# Patient Record
Sex: FEMALE | Race: WHITE | Employment: OTHER | URBAN - METROPOLITAN AREA
[De-identification: names, ages, dates, MRNs, and addresses within clinical notes are randomized per-mention and may not be internally consistent; named-entity substitution may affect disease eponyms.]

---

## 2017-01-23 RX ORDER — TIZANIDINE 2 MG/1
TABLET ORAL
Qty: 60 TABLET | Refills: 2 | Status: SHIPPED | OUTPATIENT
Start: 2017-01-23 | End: 2017-09-17 | Stop reason: SDUPTHER

## 2017-01-26 DIAGNOSIS — F32.0 MAJOR DEPRESSIVE DISORDER, SINGLE EPISODE, MILD (HCC): ICD-10-CM

## 2017-01-26 RX ORDER — FLUOXETINE HYDROCHLORIDE 20 MG/1
CAPSULE ORAL
Qty: 270 CAPSULE | Refills: 1 | Status: SHIPPED | OUTPATIENT
Start: 2017-01-26 | End: 2017-07-31 | Stop reason: SDUPTHER

## 2017-02-07 ENCOUNTER — TELEPHONE (OUTPATIENT)
Dept: INTERNAL MEDICINE CLINIC | Age: 62
End: 2017-02-07

## 2017-02-08 RX ORDER — ALPRAZOLAM 0.25 MG/1
0.25 TABLET ORAL EVERY 4 HOURS PRN
Qty: 100 TABLET | Refills: 0 | Status: SHIPPED | OUTPATIENT
Start: 2017-02-08 | End: 2017-04-18 | Stop reason: SDUPTHER

## 2017-02-15 ENCOUNTER — OFFICE VISIT (OUTPATIENT)
Dept: INTERNAL MEDICINE CLINIC | Age: 62
End: 2017-02-15

## 2017-02-15 VITALS
HEART RATE: 80 BPM | SYSTOLIC BLOOD PRESSURE: 128 MMHG | WEIGHT: 293 LBS | DIASTOLIC BLOOD PRESSURE: 76 MMHG | BODY MASS INDEX: 44.41 KG/M2 | HEIGHT: 68 IN

## 2017-02-15 DIAGNOSIS — I10 ESSENTIAL HYPERTENSION: ICD-10-CM

## 2017-02-15 DIAGNOSIS — F33.42 RECURRENT MAJOR DEPRESSIVE DISORDER, IN FULL REMISSION (HCC): ICD-10-CM

## 2017-02-15 DIAGNOSIS — E78.2 MIXED HYPERLIPIDEMIA: Primary | ICD-10-CM

## 2017-02-15 DIAGNOSIS — E03.9 ACQUIRED HYPOTHYROIDISM: ICD-10-CM

## 2017-02-15 DIAGNOSIS — Z12.31 ENCOUNTER FOR SCREENING MAMMOGRAM FOR BREAST CANCER: ICD-10-CM

## 2017-02-15 DIAGNOSIS — E66.01 MORBID OBESITY WITH BMI OF 45.0-49.9, ADULT (HCC): ICD-10-CM

## 2017-02-15 DIAGNOSIS — E78.2 MIXED HYPERLIPIDEMIA: ICD-10-CM

## 2017-02-15 LAB
ANION GAP SERPL CALCULATED.3IONS-SCNC: 22 MMOL/L (ref 3–16)
BUN BLDV-MCNC: 15 MG/DL (ref 7–20)
CALCIUM SERPL-MCNC: 9.4 MG/DL (ref 8.3–10.6)
CHLORIDE BLD-SCNC: 98 MMOL/L (ref 99–110)
CHOLESTEROL, TOTAL: 202 MG/DL (ref 0–199)
CO2: 23 MMOL/L (ref 21–32)
CREAT SERPL-MCNC: 0.8 MG/DL (ref 0.6–1.2)
GFR AFRICAN AMERICAN: >60
GFR NON-AFRICAN AMERICAN: >60
GLUCOSE BLD-MCNC: 83 MG/DL (ref 70–99)
HDLC SERPL-MCNC: 74 MG/DL (ref 40–60)
LDL CHOLESTEROL CALCULATED: 96 MG/DL
POTASSIUM SERPL-SCNC: 4.8 MMOL/L (ref 3.5–5.1)
SODIUM BLD-SCNC: 143 MMOL/L (ref 136–145)
TRIGL SERPL-MCNC: 162 MG/DL (ref 0–150)
TSH SERPL DL<=0.05 MIU/L-ACNC: 3.6 UIU/ML (ref 0.27–4.2)
VLDLC SERPL CALC-MCNC: 32 MG/DL

## 2017-02-15 PROCEDURE — 99214 OFFICE O/P EST MOD 30 MIN: CPT | Performed by: INTERNAL MEDICINE

## 2017-02-15 ASSESSMENT — ENCOUNTER SYMPTOMS
EYE REDNESS: 0
EYE PAIN: 1
SHORTNESS OF BREATH: 0

## 2017-03-06 RX ORDER — LEVOTHYROXINE SODIUM 0.2 MG/1
TABLET ORAL
Qty: 90 TABLET | Refills: 0 | Status: SHIPPED | OUTPATIENT
Start: 2017-03-06 | End: 2017-06-06 | Stop reason: SDUPTHER

## 2017-03-22 RX ORDER — TRAMADOL HYDROCHLORIDE 50 MG/1
TABLET ORAL
Qty: 60 TABLET | Refills: 0 | Status: SHIPPED | OUTPATIENT
Start: 2017-03-22 | End: 2017-05-01 | Stop reason: SDUPTHER

## 2017-04-05 RX ORDER — AMLODIPINE BESYLATE 10 MG/1
TABLET ORAL
Qty: 90 TABLET | Refills: 1 | Status: SHIPPED | OUTPATIENT
Start: 2017-04-05 | End: 2017-10-11 | Stop reason: SDUPTHER

## 2017-04-05 RX ORDER — OMEPRAZOLE 40 MG/1
CAPSULE, DELAYED RELEASE ORAL
Qty: 90 CAPSULE | Refills: 1 | Status: SHIPPED | OUTPATIENT
Start: 2017-04-05 | End: 2017-10-11 | Stop reason: SDUPTHER

## 2017-04-05 RX ORDER — HYDROCODONE BITARTRATE AND ACETAMINOPHEN 5; 325 MG/1; MG/1
1 TABLET ORAL EVERY 6 HOURS PRN
Qty: 30 TABLET | Refills: 0 | Status: SHIPPED | OUTPATIENT
Start: 2017-04-05 | End: 2017-10-23 | Stop reason: SDUPTHER

## 2017-04-16 RX ORDER — PRAVASTATIN SODIUM 80 MG/1
TABLET ORAL
Qty: 90 TABLET | Refills: 1 | Status: SHIPPED | OUTPATIENT
Start: 2017-04-16 | End: 2017-09-26 | Stop reason: ALTCHOICE

## 2017-04-19 RX ORDER — ALPRAZOLAM 0.25 MG/1
TABLET ORAL
Qty: 100 TABLET | Refills: 0 | Status: SHIPPED | OUTPATIENT
Start: 2017-04-19 | End: 2017-08-29 | Stop reason: SDUPTHER

## 2017-05-02 RX ORDER — TRAMADOL HYDROCHLORIDE 50 MG/1
TABLET ORAL
Qty: 60 TABLET | Refills: 0 | Status: SHIPPED | OUTPATIENT
Start: 2017-05-02 | End: 2017-09-26 | Stop reason: ALTCHOICE

## 2017-05-16 ENCOUNTER — OFFICE VISIT (OUTPATIENT)
Dept: INTERNAL MEDICINE CLINIC | Age: 62
End: 2017-05-16

## 2017-05-16 VITALS
HEIGHT: 68 IN | SYSTOLIC BLOOD PRESSURE: 122 MMHG | RESPIRATION RATE: 12 BRPM | DIASTOLIC BLOOD PRESSURE: 76 MMHG | BODY MASS INDEX: 44.41 KG/M2 | HEART RATE: 83 BPM | WEIGHT: 293 LBS | OXYGEN SATURATION: 96 % | TEMPERATURE: 98.4 F

## 2017-05-16 DIAGNOSIS — I10 ESSENTIAL HYPERTENSION: Primary | ICD-10-CM

## 2017-05-16 DIAGNOSIS — E03.9 ACQUIRED HYPOTHYROIDISM: ICD-10-CM

## 2017-05-16 DIAGNOSIS — F41.9 ANXIETY: ICD-10-CM

## 2017-05-16 DIAGNOSIS — F32.A DEPRESSION, UNSPECIFIED DEPRESSION TYPE: ICD-10-CM

## 2017-05-16 DIAGNOSIS — E78.2 MIXED HYPERLIPIDEMIA: ICD-10-CM

## 2017-05-16 DIAGNOSIS — E55.9 VITAMIN D DEFICIENCY: ICD-10-CM

## 2017-05-16 DIAGNOSIS — Z12.31 ENCOUNTER FOR SCREENING MAMMOGRAM FOR BREAST CANCER: ICD-10-CM

## 2017-05-16 DIAGNOSIS — M79.7 FIBROMYALGIA: ICD-10-CM

## 2017-05-16 PROCEDURE — 99214 OFFICE O/P EST MOD 30 MIN: CPT | Performed by: INTERNAL MEDICINE

## 2017-05-16 ASSESSMENT — PATIENT HEALTH QUESTIONNAIRE - PHQ9
2. FEELING DOWN, DEPRESSED OR HOPELESS: 1
1. LITTLE INTEREST OR PLEASURE IN DOING THINGS: 1
SUM OF ALL RESPONSES TO PHQ9 QUESTIONS 1 & 2: 2
SUM OF ALL RESPONSES TO PHQ QUESTIONS 1-9: 2

## 2017-05-22 ASSESSMENT — ENCOUNTER SYMPTOMS
SINUS PRESSURE: 0
SORE THROAT: 0
CHEST TIGHTNESS: 0
VOMITING: 0
ABDOMINAL PAIN: 0
NAUSEA: 0
BLOOD IN STOOL: 0
DIARRHEA: 0
TROUBLE SWALLOWING: 0
COUGH: 0
RHINORRHEA: 0
WHEEZING: 0
CONSTIPATION: 0
SHORTNESS OF BREATH: 0

## 2017-06-06 RX ORDER — LEVOTHYROXINE SODIUM 0.2 MG/1
TABLET ORAL
Qty: 90 TABLET | Refills: 0 | Status: SHIPPED | OUTPATIENT
Start: 2017-06-06 | End: 2017-09-07 | Stop reason: SDUPTHER

## 2017-06-22 RX ORDER — METHOCARBAMOL 500 MG/1
500 TABLET, FILM COATED ORAL 4 TIMES DAILY PRN
Qty: 60 TABLET | Refills: 3 | Status: SHIPPED | OUTPATIENT
Start: 2017-06-22 | End: 2018-04-25 | Stop reason: SDUPTHER

## 2017-07-31 DIAGNOSIS — F32.0 MAJOR DEPRESSIVE DISORDER, SINGLE EPISODE, MILD (HCC): ICD-10-CM

## 2017-07-31 RX ORDER — FLUOXETINE HYDROCHLORIDE 20 MG/1
CAPSULE ORAL
Qty: 270 CAPSULE | Refills: 0 | Status: SHIPPED | OUTPATIENT
Start: 2017-07-31 | End: 2017-10-30 | Stop reason: SDUPTHER

## 2017-08-21 RX ORDER — ALPRAZOLAM 0.25 MG/1
TABLET ORAL
Qty: 100 TABLET | Refills: 0 | OUTPATIENT
Start: 2017-08-21

## 2017-08-29 ENCOUNTER — OFFICE VISIT (OUTPATIENT)
Dept: INTERNAL MEDICINE CLINIC | Age: 62
End: 2017-08-29

## 2017-08-29 VITALS
DIASTOLIC BLOOD PRESSURE: 78 MMHG | WEIGHT: 293 LBS | HEART RATE: 65 BPM | HEIGHT: 68 IN | OXYGEN SATURATION: 98 % | BODY MASS INDEX: 44.41 KG/M2 | SYSTOLIC BLOOD PRESSURE: 130 MMHG

## 2017-08-29 DIAGNOSIS — R20.2 PARESTHESIAS: ICD-10-CM

## 2017-08-29 DIAGNOSIS — E78.2 MIXED HYPERLIPIDEMIA: ICD-10-CM

## 2017-08-29 DIAGNOSIS — M79.7 FIBROMYALGIA: ICD-10-CM

## 2017-08-29 DIAGNOSIS — I10 ESSENTIAL HYPERTENSION: ICD-10-CM

## 2017-08-29 DIAGNOSIS — F32.A DEPRESSION, UNSPECIFIED DEPRESSION TYPE: ICD-10-CM

## 2017-08-29 DIAGNOSIS — M79.605 PAIN IN BOTH LOWER EXTREMITIES: ICD-10-CM

## 2017-08-29 DIAGNOSIS — M79.604 PAIN IN BOTH LOWER EXTREMITIES: ICD-10-CM

## 2017-08-29 DIAGNOSIS — E03.9 ACQUIRED HYPOTHYROIDISM: Primary | ICD-10-CM

## 2017-08-29 DIAGNOSIS — E03.9 ACQUIRED HYPOTHYROIDISM: ICD-10-CM

## 2017-08-29 DIAGNOSIS — F41.9 ANXIETY: ICD-10-CM

## 2017-08-29 LAB
A/G RATIO: 1.4 (ref 1.1–2.2)
ALBUMIN SERPL-MCNC: 3.9 G/DL (ref 3.4–5)
ALP BLD-CCNC: 61 U/L (ref 40–129)
ALT SERPL-CCNC: 13 U/L (ref 10–40)
ANION GAP SERPL CALCULATED.3IONS-SCNC: 12 MMOL/L (ref 3–16)
AST SERPL-CCNC: 15 U/L (ref 15–37)
BASOPHILS ABSOLUTE: 0.1 K/UL (ref 0–0.2)
BASOPHILS RELATIVE PERCENT: 0.9 %
BILIRUB SERPL-MCNC: 0.3 MG/DL (ref 0–1)
BUN BLDV-MCNC: 14 MG/DL (ref 7–20)
CALCIUM SERPL-MCNC: 9 MG/DL (ref 8.3–10.6)
CHLORIDE BLD-SCNC: 103 MMOL/L (ref 99–110)
CHOLESTEROL, TOTAL: 182 MG/DL (ref 0–199)
CO2: 26 MMOL/L (ref 21–32)
CREAT SERPL-MCNC: 0.8 MG/DL (ref 0.6–1.2)
EOSINOPHILS ABSOLUTE: 0.4 K/UL (ref 0–0.6)
EOSINOPHILS RELATIVE PERCENT: 5.2 %
GFR AFRICAN AMERICAN: >60
GFR NON-AFRICAN AMERICAN: >60
GLOBULIN: 2.8 G/DL
GLUCOSE BLD-MCNC: 115 MG/DL (ref 70–99)
HCT VFR BLD CALC: 41 % (ref 36–48)
HDLC SERPL-MCNC: 67 MG/DL (ref 40–60)
HEMOGLOBIN: 12.8 G/DL (ref 12–16)
LDL CHOLESTEROL CALCULATED: 85 MG/DL
LYMPHOCYTES ABSOLUTE: 2.6 K/UL (ref 1–5.1)
LYMPHOCYTES RELATIVE PERCENT: 35.5 %
MAGNESIUM: 1.8 MG/DL (ref 1.8–2.4)
MCH RBC QN AUTO: 24.8 PG (ref 26–34)
MCHC RBC AUTO-ENTMCNC: 31.2 G/DL (ref 31–36)
MCV RBC AUTO: 79.7 FL (ref 80–100)
MONOCYTES ABSOLUTE: 0.4 K/UL (ref 0–1.3)
MONOCYTES RELATIVE PERCENT: 5.3 %
NEUTROPHILS ABSOLUTE: 3.9 K/UL (ref 1.7–7.7)
NEUTROPHILS RELATIVE PERCENT: 53.1 %
PDW BLD-RTO: 17 % (ref 12.4–15.4)
PLATELET # BLD: 358 K/UL (ref 135–450)
PMV BLD AUTO: 9.2 FL (ref 5–10.5)
POTASSIUM SERPL-SCNC: 4.3 MMOL/L (ref 3.5–5.1)
RBC # BLD: 5.15 M/UL (ref 4–5.2)
SODIUM BLD-SCNC: 141 MMOL/L (ref 136–145)
TOTAL CK: 139 U/L (ref 26–192)
TOTAL PROTEIN: 6.7 G/DL (ref 6.4–8.2)
TRIGL SERPL-MCNC: 150 MG/DL (ref 0–150)
TSH SERPL DL<=0.05 MIU/L-ACNC: 4.89 UIU/ML (ref 0.27–4.2)
VITAMIN B-12: 877 PG/ML (ref 211–911)
VLDLC SERPL CALC-MCNC: 30 MG/DL
WBC # BLD: 7.3 K/UL (ref 4–11)

## 2017-08-29 PROCEDURE — 99214 OFFICE O/P EST MOD 30 MIN: CPT | Performed by: INTERNAL MEDICINE

## 2017-08-29 RX ORDER — ALPRAZOLAM 0.25 MG/1
0.25 TABLET ORAL 2 TIMES DAILY PRN
Qty: 60 TABLET | Refills: 1 | Status: SHIPPED | OUTPATIENT
Start: 2017-08-29 | End: 2018-01-24 | Stop reason: SDUPTHER

## 2017-08-29 RX ORDER — GABAPENTIN 100 MG/1
100 CAPSULE ORAL NIGHTLY
Qty: 30 CAPSULE | Refills: 0 | Status: SHIPPED | OUTPATIENT
Start: 2017-08-29 | End: 2017-10-23 | Stop reason: SDUPTHER

## 2017-08-30 ASSESSMENT — ENCOUNTER SYMPTOMS
NAUSEA: 0
DIARRHEA: 0
TROUBLE SWALLOWING: 0
RHINORRHEA: 0
ABDOMINAL PAIN: 0
CONSTIPATION: 0
VOMITING: 0
COUGH: 0
WHEEZING: 0
BLOOD IN STOOL: 0
SHORTNESS OF BREATH: 0
SORE THROAT: 0
SINUS PRESSURE: 0
CHEST TIGHTNESS: 0

## 2017-09-07 DIAGNOSIS — E03.9 ACQUIRED HYPOTHYROIDISM: Primary | ICD-10-CM

## 2017-09-08 RX ORDER — LEVOTHYROXINE SODIUM 0.2 MG/1
TABLET ORAL
Qty: 90 TABLET | Refills: 0 | Status: SHIPPED | OUTPATIENT
Start: 2017-09-08 | End: 2017-12-21 | Stop reason: SDUPTHER

## 2017-09-18 RX ORDER — TIZANIDINE 2 MG/1
2 TABLET ORAL NIGHTLY
Qty: 30 TABLET | Refills: 3 | Status: SHIPPED | OUTPATIENT
Start: 2017-09-18 | End: 2018-03-16 | Stop reason: SDUPTHER

## 2017-09-26 ENCOUNTER — OFFICE VISIT (OUTPATIENT)
Dept: INTERNAL MEDICINE CLINIC | Age: 62
End: 2017-09-26

## 2017-09-26 VITALS
DIASTOLIC BLOOD PRESSURE: 76 MMHG | TEMPERATURE: 97.9 F | OXYGEN SATURATION: 96 % | SYSTOLIC BLOOD PRESSURE: 132 MMHG | BODY MASS INDEX: 44.41 KG/M2 | WEIGHT: 293 LBS | HEIGHT: 68 IN | RESPIRATION RATE: 14 BRPM | HEART RATE: 77 BPM

## 2017-09-26 DIAGNOSIS — M54.6 LEFT-SIDED THORACIC BACK PAIN, UNSPECIFIED CHRONICITY: ICD-10-CM

## 2017-09-26 DIAGNOSIS — R10.13 EPIGASTRIC ABDOMINAL PAIN: ICD-10-CM

## 2017-09-26 DIAGNOSIS — M79.605 PAIN IN BOTH LOWER EXTREMITIES: Primary | ICD-10-CM

## 2017-09-26 DIAGNOSIS — R19.7 DIARRHEA, UNSPECIFIED TYPE: ICD-10-CM

## 2017-09-26 DIAGNOSIS — R11.0 NAUSEA: ICD-10-CM

## 2017-09-26 DIAGNOSIS — R73.9 HYPERGLYCEMIA: ICD-10-CM

## 2017-09-26 DIAGNOSIS — R10.12 ABDOMINAL PAIN, LEFT UPPER QUADRANT: ICD-10-CM

## 2017-09-26 DIAGNOSIS — E03.9 ACQUIRED HYPOTHYROIDISM: ICD-10-CM

## 2017-09-26 DIAGNOSIS — M79.604 PAIN IN BOTH LOWER EXTREMITIES: Primary | ICD-10-CM

## 2017-09-26 LAB
BILIRUBIN, POC: NORMAL
BLOOD URINE, POC: NORMAL
CLARITY, POC: NORMAL
COLOR, POC: NORMAL
GLUCOSE URINE, POC: NORMAL
KETONES, POC: NORMAL
LEUKOCYTE EST, POC: NORMAL
NITRITE, POC: NORMAL
PH, POC: 6
PROTEIN, POC: NORMAL
SPECIFIC GRAVITY, POC: >=1.03
UROBILINOGEN, POC: 0.2

## 2017-09-26 PROCEDURE — 99214 OFFICE O/P EST MOD 30 MIN: CPT | Performed by: INTERNAL MEDICINE

## 2017-09-26 PROCEDURE — 81002 URINALYSIS NONAUTO W/O SCOPE: CPT | Performed by: INTERNAL MEDICINE

## 2017-09-26 RX ORDER — PROMETHAZINE HYDROCHLORIDE 25 MG/1
25 TABLET ORAL EVERY 6 HOURS PRN
Qty: 15 TABLET | Refills: 0 | Status: ON HOLD | OUTPATIENT
Start: 2017-09-26 | End: 2022-06-06 | Stop reason: HOSPADM

## 2017-09-26 RX ORDER — LEVOTHYROXINE SODIUM 0.03 MG/1
25 TABLET ORAL DAILY
Qty: 30 TABLET | Refills: 1 | Status: SHIPPED | OUTPATIENT
Start: 2017-09-26 | End: 2017-10-19 | Stop reason: SDUPTHER

## 2017-09-26 RX ORDER — FAMOTIDINE 20 MG/1
20 TABLET, FILM COATED ORAL 2 TIMES DAILY
Qty: 30 TABLET | Refills: 0 | Status: SHIPPED | OUTPATIENT
Start: 2017-09-26 | End: 2017-12-21 | Stop reason: SDUPTHER

## 2017-09-27 ENCOUNTER — CARE COORDINATION (OUTPATIENT)
Dept: CARE COORDINATION | Age: 62
End: 2017-09-27

## 2017-09-27 ASSESSMENT — ENCOUNTER SYMPTOMS
CHEST TIGHTNESS: 0
BACK PAIN: 1
COUGH: 0
CONSTIPATION: 0
ABDOMINAL PAIN: 1
SORE THROAT: 0
SINUS PRESSURE: 0
BLOOD IN STOOL: 0
SHORTNESS OF BREATH: 0
DIARRHEA: 1
NAUSEA: 1
RHINORRHEA: 0
WHEEZING: 0
VOMITING: 0

## 2017-09-27 NOTE — CARE COORDINATION
Ambulatory Care Coordination ED Follow up Call       Reason for ED Visit:  Abdominal Pain   Care Management Risk Score: CMRS 4    Left message for a return call regarding ED visit 9/26 . Will attempt to reach patient again.      [facility] Coordination   [phone]

## 2017-10-02 ENCOUNTER — TELEPHONE (OUTPATIENT)
Dept: INTERNAL MEDICINE CLINIC | Age: 62
End: 2017-10-02

## 2017-10-02 NOTE — CARE COORDINATION
Ambulatory Care Coordination ED Follow up Call       Reason for ED Visit:  Abdominal Pain   Care Management Risk Score: CMRS 4    Left another message regarding ED visit 9/26. Will wait for a return call.      135 [facility] Coordination   [phone]

## 2017-10-11 RX ORDER — AMLODIPINE BESYLATE 10 MG/1
TABLET ORAL
Qty: 90 TABLET | Refills: 1 | Status: SHIPPED | OUTPATIENT
Start: 2017-10-11 | End: 2018-01-03 | Stop reason: SDUPTHER

## 2017-10-11 RX ORDER — OMEPRAZOLE 40 MG/1
CAPSULE, DELAYED RELEASE ORAL
Qty: 90 CAPSULE | Refills: 1 | Status: SHIPPED | OUTPATIENT
Start: 2017-10-11 | End: 2018-04-16 | Stop reason: SDUPTHER

## 2017-10-19 DIAGNOSIS — E03.9 ACQUIRED HYPOTHYROIDISM: ICD-10-CM

## 2017-10-19 RX ORDER — LEVOTHYROXINE SODIUM 0.03 MG/1
25 TABLET ORAL DAILY
Qty: 90 TABLET | Refills: 0 | Status: SHIPPED | OUTPATIENT
Start: 2017-10-19 | End: 2018-02-02 | Stop reason: SDUPTHER

## 2017-10-23 DIAGNOSIS — R20.2 PARESTHESIAS: Primary | ICD-10-CM

## 2017-10-23 DIAGNOSIS — M79.7 FIBROMYALGIA: Primary | ICD-10-CM

## 2017-10-23 RX ORDER — HYDROCODONE BITARTRATE AND ACETAMINOPHEN 5; 325 MG/1; MG/1
1 TABLET ORAL EVERY 6 HOURS PRN
Qty: 30 TABLET | Refills: 0 | Status: SHIPPED | OUTPATIENT
Start: 2017-10-23 | End: 2018-01-11 | Stop reason: SDUPTHER

## 2017-10-23 RX ORDER — GABAPENTIN 100 MG/1
100 CAPSULE ORAL NIGHTLY
Qty: 90 CAPSULE | Refills: 0 | Status: SHIPPED | OUTPATIENT
Start: 2017-10-23 | End: 2020-12-31 | Stop reason: ALTCHOICE

## 2017-10-23 NOTE — TELEPHONE ENCOUNTER
From: Rosie Duffy  Sent: 10/22/2017 1:27 AM EDT  Subject: Medication Renewal Request    Rosie Tati would like a refill of the following medications:  HYDROcodone-acetaminophen ([facility]) 5-325 MG per tablet Anca Rueda MD]    Preferred pharmacy: [address] [place], [address] [place] [address] [phone] - F [phone]    Comment:  I am having a really bad flare up of fibro pain.  Thank you

## 2017-10-24 NOTE — TELEPHONE ENCOUNTER
Rx for [facility] refilled. Call pt to schedule follow up to be seen no later than 11/29/17 for fibromyalgia, anxiety, and chronic medical conditions. Pt takes controlled substance medications.

## 2017-10-30 DIAGNOSIS — F32.0 MAJOR DEPRESSIVE DISORDER, SINGLE EPISODE, MILD (HCC): ICD-10-CM

## 2017-10-30 RX ORDER — FLUOXETINE HYDROCHLORIDE 20 MG/1
CAPSULE ORAL
Qty: 270 CAPSULE | Refills: 0 | Status: SHIPPED | OUTPATIENT
Start: 2017-10-30 | End: 2018-01-29 | Stop reason: SDUPTHER

## 2017-11-02 RX ORDER — PRAVASTATIN SODIUM 80 MG/1
TABLET ORAL
Qty: 90 TABLET | Refills: 1 | Status: SHIPPED | OUTPATIENT
Start: 2017-11-02 | End: 2018-02-22 | Stop reason: SINTOL

## 2017-11-10 ENCOUNTER — TELEPHONE (OUTPATIENT)
Dept: OTHER | Facility: CLINIC | Age: 62
End: 2017-11-10

## 2017-11-24 ENCOUNTER — OFFICE VISIT (OUTPATIENT)
Dept: INTERNAL MEDICINE CLINIC | Age: 62
End: 2017-11-24

## 2017-11-24 VITALS
SYSTOLIC BLOOD PRESSURE: 130 MMHG | RESPIRATION RATE: 12 BRPM | OXYGEN SATURATION: 96 % | TEMPERATURE: 98.3 F | WEIGHT: 293 LBS | HEIGHT: 68 IN | DIASTOLIC BLOOD PRESSURE: 78 MMHG | BODY MASS INDEX: 44.41 KG/M2 | HEART RATE: 77 BPM

## 2017-11-24 DIAGNOSIS — I10 ESSENTIAL HYPERTENSION: ICD-10-CM

## 2017-11-24 DIAGNOSIS — E78.2 MIXED HYPERLIPIDEMIA: ICD-10-CM

## 2017-11-24 DIAGNOSIS — R73.9 HYPERGLYCEMIA: ICD-10-CM

## 2017-11-24 DIAGNOSIS — Z12.31 ENCOUNTER FOR SCREENING MAMMOGRAM FOR BREAST CANCER: ICD-10-CM

## 2017-11-24 DIAGNOSIS — E66.01 MORBID OBESITY WITH BMI OF 45.0-49.9, ADULT (HCC): ICD-10-CM

## 2017-11-24 DIAGNOSIS — E55.9 VITAMIN D DEFICIENCY: ICD-10-CM

## 2017-11-24 DIAGNOSIS — E03.9 ACQUIRED HYPOTHYROIDISM: ICD-10-CM

## 2017-11-24 DIAGNOSIS — I10 ESSENTIAL HYPERTENSION: Primary | ICD-10-CM

## 2017-11-24 DIAGNOSIS — F33.41 RECURRENT MAJOR DEPRESSIVE DISORDER, IN PARTIAL REMISSION (HCC): ICD-10-CM

## 2017-11-24 DIAGNOSIS — Z23 NEED FOR INFLUENZA VACCINATION: ICD-10-CM

## 2017-11-24 DIAGNOSIS — R26.89 BALANCE PROBLEMS: ICD-10-CM

## 2017-11-24 LAB
A/G RATIO: 1.4 (ref 1.1–2.2)
ALBUMIN SERPL-MCNC: 4.2 G/DL (ref 3.4–5)
ALP BLD-CCNC: 68 U/L (ref 40–129)
ALT SERPL-CCNC: 16 U/L (ref 10–40)
ANION GAP SERPL CALCULATED.3IONS-SCNC: 16 MMOL/L (ref 3–16)
AST SERPL-CCNC: 17 U/L (ref 15–37)
BILIRUB SERPL-MCNC: 0.3 MG/DL (ref 0–1)
BUN BLDV-MCNC: 18 MG/DL (ref 7–20)
CALCIUM SERPL-MCNC: 9.1 MG/DL (ref 8.3–10.6)
CHLORIDE BLD-SCNC: 100 MMOL/L (ref 99–110)
CHOLESTEROL, TOTAL: 225 MG/DL (ref 0–199)
CO2: 27 MMOL/L (ref 21–32)
CREAT SERPL-MCNC: 0.9 MG/DL (ref 0.6–1.2)
GFR AFRICAN AMERICAN: >60
GFR NON-AFRICAN AMERICAN: >60
GLOBULIN: 2.9 G/DL
GLUCOSE BLD-MCNC: 115 MG/DL (ref 70–99)
HDLC SERPL-MCNC: 73 MG/DL (ref 40–60)
LDL CHOLESTEROL CALCULATED: 124 MG/DL
POTASSIUM SERPL-SCNC: 4.8 MMOL/L (ref 3.5–5.1)
SODIUM BLD-SCNC: 143 MMOL/L (ref 136–145)
TOTAL PROTEIN: 7.1 G/DL (ref 6.4–8.2)
TRIGL SERPL-MCNC: 142 MG/DL (ref 0–150)
TSH SERPL DL<=0.05 MIU/L-ACNC: 1.98 UIU/ML (ref 0.27–4.2)
VITAMIN D 25-HYDROXY: 39.6 NG/ML
VLDLC SERPL CALC-MCNC: 28 MG/DL

## 2017-11-24 PROCEDURE — G8427 DOCREV CUR MEDS BY ELIG CLIN: HCPCS | Performed by: INTERNAL MEDICINE

## 2017-11-24 PROCEDURE — 3017F COLORECTAL CA SCREEN DOC REV: CPT | Performed by: INTERNAL MEDICINE

## 2017-11-24 PROCEDURE — 3014F SCREEN MAMMO DOC REV: CPT | Performed by: INTERNAL MEDICINE

## 2017-11-24 PROCEDURE — 1036F TOBACCO NON-USER: CPT | Performed by: INTERNAL MEDICINE

## 2017-11-24 PROCEDURE — G8484 FLU IMMUNIZE NO ADMIN: HCPCS | Performed by: INTERNAL MEDICINE

## 2017-11-24 PROCEDURE — 90630 INFLUENZA, QUADV, 18-64 YRS, ID, PF, MICRO INJ, 0.1ML (FLUZONE QUADV, PF): CPT | Performed by: INTERNAL MEDICINE

## 2017-11-24 PROCEDURE — 99214 OFFICE O/P EST MOD 30 MIN: CPT | Performed by: INTERNAL MEDICINE

## 2017-11-24 PROCEDURE — G8417 CALC BMI ABV UP PARAM F/U: HCPCS | Performed by: INTERNAL MEDICINE

## 2017-11-24 PROCEDURE — G0008 ADMIN INFLUENZA VIRUS VAC: HCPCS | Performed by: INTERNAL MEDICINE

## 2017-11-24 RX ORDER — BENZONATATE 100 MG/1
100 CAPSULE ORAL 3 TIMES DAILY PRN
Qty: 30 CAPSULE | Refills: 0 | Status: SHIPPED | OUTPATIENT
Start: 2017-11-24 | End: 2017-12-01

## 2017-11-24 RX ORDER — FLUTICASONE PROPIONATE 50 MCG
2 SPRAY, SUSPENSION (ML) NASAL DAILY
Qty: 1 BOTTLE | Refills: 0 | Status: SHIPPED | OUTPATIENT
Start: 2017-11-24

## 2017-11-24 RX ORDER — BUPROPION HYDROCHLORIDE 150 MG/1
150 TABLET ORAL EVERY MORNING
Qty: 30 TABLET | Refills: 1 | Status: SHIPPED | OUTPATIENT
Start: 2017-11-24 | End: 2018-01-19 | Stop reason: SDUPTHER

## 2017-11-24 ASSESSMENT — ENCOUNTER SYMPTOMS
RHINORRHEA: 0
NAUSEA: 0
VOMITING: 0
WHEEZING: 0
SHORTNESS OF BREATH: 0
SORE THROAT: 0
DIARRHEA: 0
CONSTIPATION: 0
COUGH: 0
CHEST TIGHTNESS: 0
BLOOD IN STOOL: 0
ABDOMINAL PAIN: 0
SINUS PRESSURE: 0

## 2017-11-24 NOTE — PATIENT INSTRUCTIONS
-Continue same medications  -start Wellbutrin XL 150mg once daily for depression  -Flonase nasal spray 2 sprays each nostril once daily   -Tessalon Perles 100mg 3 times daily as needed for cough  -discontinue Gabapentin  -Referral to Bariatrics for weight loss  -Low sodium diet  -Low fat, low cholesterol diet  -Regular aerobic exercise  -Referral to Neurology for balance problems  Counseling Options: [facility]    [facility] [address]  (ACF Counseling)  [address] [facility]  [address]  [address], [address] [facility]    P: [address]   [address] [facility], [address] [facility]    P: [phone]  F: [phone]      Patient Education        Influenza (Flu) Vaccine (Inactivated or Recombinant): What You Need to Know  Why get vaccinated? Influenza (\"flu\") is a contagious disease that spreads around the [facility] every winter, usually between October and May. Flu is caused by influenza viruses and is spread mainly by coughing, sneezing, and close contact. Anyone can get flu. Flu strikes suddenly and can last several days. Symptoms vary by age, but can include:  · Fever/chills. · Sore throat. · Muscle aches. · Fatigue. · Cough. · Headache. · Runny or stuffy nose. Flu can also lead to pneumonia and blood infections, and cause diarrhea and seizures in children. If you have a medical condition, such as heart or lung disease, flu can make it worse. Flu is more dangerous for some people. Infants and young children, people 72years of age and older, pregnant women, and people with certain health conditions or a weakened immune system are at greatest risk. Each year thousands of people in the [facility] die from flu, and many more are hospitalized. Flu vaccine can:  · Keep you from getting flu. · Make flu less severe if you do get it. · Keep you from spreading flu to your family and other people.   Inactivated and recombinant flu vaccines  A dose of instructions adapted under license by [facility] ([facility]). If you have questions about a medical condition or this instruction, always ask your healthcare professional. [address] any warranty or liability for your use of this information.

## 2017-11-24 NOTE — PROGRESS NOTES
and hematuria. Musculoskeletal: Negative for arthralgias and myalgias. Skin: Negative for rash. Neurological: Negative for dizziness, tremors, syncope, weakness, light-headedness, numbness and headaches. Psychiatric/Behavioral: Negative for dysphoric mood and sleep disturbance. The patient is not nervous/anxious. Allergies   Allergen Reactions    Cephalosporins      Cough and congestion    Dilaudid [Hydromorphone Hcl] Itching     Nervous, unable to sleep   Harl Rack Adair Isidro [Ceftazidime]      Cough and congestion    Statins Other (See Comments)     Muscle cramping/weakness     Outpatient Prescriptions Marked as Taking for the 11/24/17 encounter (Office Visit) with Shemar Ball MD   Medication Sig Dispense Refill    FLUoxetine (PROZAC) 20 MG capsule TAKE 3 CAPSULES BY MOUTH DAILY 270 capsule 0    HYDROcodone-acetaminophen (NORCO) 5-325 MG per tablet Take 1 tablet by mouth every 6 hours as needed for Pain .  30 tablet 0    gabapentin (NEURONTIN) 100 MG capsule Take 1 capsule by mouth nightly 90 capsule 0    levothyroxine (SYNTHROID) 25 MCG tablet Take 1 tablet by mouth Daily in addition to 200mcg to equal 225mcg once daily 90 tablet 0    omeprazole (PRILOSEC) 40 MG delayed release capsule TAKE ONE CAPSULE BY MOUTH ONE TIME DAILY 90 capsule 1    amLODIPine (NORVASC) 10 MG tablet TAKE ONE TABLET BY MOUTH ONE TIME DAILY 90 tablet 1    promethazine (PHENERGAN) 25 MG tablet Take 1 tablet by mouth every 6 hours as needed for Nausea 15 tablet 0    famotidine (PEPCID) 20 MG tablet Take 1 tablet by mouth 2 times daily 30 tablet 0    vitamin D (CHOLECALCIFEROL) 5000 units CAPS capsule Take 5,000 Units by mouth daily      tiZANidine (ZANAFLEX) 2 MG tablet Take 1 tablet by mouth nightly 30 tablet 3    levothyroxine (SYNTHROID) 200 MCG tablet TAKE 1 TABLET BY MOUTH EVERY DAY 90 tablet 0    ALPRAZolam (XANAX) 0.25 MG tablet Take 1 tablet by mouth 2 times daily as needed for Anxiety 60 tablet 1    Right wrist: She exhibits tenderness. Thoracic back: She exhibits tenderness. Lumbar back: She exhibits tenderness. Right upper arm: She exhibits tenderness. Right lower leg: She exhibits tenderness. Left lower leg: She exhibits tenderness. Lymphadenopathy:        Head (right side): No submandibular adenopathy present. Head (left side): No submandibular adenopathy present. Neurological: She is alert and oriented to person, place, and time. Psychiatric: She has a normal mood and affect. Nursing note reviewed. Assessment/Plan     1. Essential hypertension    - Comprehensive Metabolic Panel; Future    2. Acquired hypothyroidism    - TSH without Reflex; Future    3. Recurrent major depressive disorder, in partial remission (HCC)    - buPROPion (WELLBUTRIN XL) 150 MG extended release tablet; Take 1 tablet by mouth every morning  Dispense: 30 tablet; Refill: 1  - Referral to Counseling Services    4. Morbid obesity with BMI of 45.0-49.9, adult ([facility]  - Lawrence Esposito MD    5. Cold    - benzonatate (TESSALON PERLES) 100 MG capsule; Take 1 capsule by mouth 3 times daily as needed for Cough  Dispense: 30 capsule; Refill: 0  - fluticasone (FLONASE) 50 MCG/ACT nasal spray; 2 sprays by Nasal route daily  Dispense: 1 Bottle; Refill: 0    6. Hyperglycemia    - Comprehensive Metabolic Panel; Future  - Hemoglobin A1C; Future    7. Mixed hyperlipidemia    - Comprehensive Metabolic Panel; Future  - Lipid Panel; Future    8. Need for influenza vaccination    - INFLUENZA, QUADV, 18-64 YRS, ID, PF, MICRO INJ, 0.1ML (FLUZONE QUADV, PF)    9. Vitamin D deficiency    - Vitamin D 25 Hydroxy; Future    10. Encounter for screening mammogram for breast cancer    - [facility] Screening Bilateral; Future    11. Balance problems    - [facility] - Trell Gibbs MD ([facility])      Discussed medications with patient, who voiced understanding of their use and indications.  All

## 2017-11-25 LAB
ESTIMATED AVERAGE GLUCOSE: 137 MG/DL
HBA1C MFR BLD: 6.4 %

## 2017-12-04 ENCOUNTER — HOSPITAL ENCOUNTER (OUTPATIENT)
Dept: MAMMOGRAPHY | Age: 62
Discharge: HOME | End: 2017-12-04
Attending: FAMILY MEDICINE | Admitting: FAMILY MEDICINE

## 2017-12-04 DIAGNOSIS — Z12.31 VISIT FOR SCREENING MAMMOGRAM: ICD-10-CM

## 2017-12-21 ENCOUNTER — OFFICE VISIT (OUTPATIENT)
Dept: INTERNAL MEDICINE CLINIC | Age: 62
End: 2017-12-21

## 2017-12-21 VITALS
OXYGEN SATURATION: 96 % | WEIGHT: 293 LBS | HEART RATE: 79 BPM | HEIGHT: 68 IN | SYSTOLIC BLOOD PRESSURE: 128 MMHG | TEMPERATURE: 98.4 F | RESPIRATION RATE: 14 BRPM | DIASTOLIC BLOOD PRESSURE: 76 MMHG | BODY MASS INDEX: 44.41 KG/M2

## 2017-12-21 DIAGNOSIS — F33.41 RECURRENT MAJOR DEPRESSIVE DISORDER, IN PARTIAL REMISSION (HCC): Primary | ICD-10-CM

## 2017-12-21 PROCEDURE — G8417 CALC BMI ABV UP PARAM F/U: HCPCS | Performed by: INTERNAL MEDICINE

## 2017-12-21 PROCEDURE — 1036F TOBACCO NON-USER: CPT | Performed by: INTERNAL MEDICINE

## 2017-12-21 PROCEDURE — 99213 OFFICE O/P EST LOW 20 MIN: CPT | Performed by: INTERNAL MEDICINE

## 2017-12-21 PROCEDURE — 3017F COLORECTAL CA SCREEN DOC REV: CPT | Performed by: INTERNAL MEDICINE

## 2017-12-21 PROCEDURE — G8484 FLU IMMUNIZE NO ADMIN: HCPCS | Performed by: INTERNAL MEDICINE

## 2017-12-21 PROCEDURE — 3014F SCREEN MAMMO DOC REV: CPT | Performed by: INTERNAL MEDICINE

## 2017-12-21 PROCEDURE — G8427 DOCREV CUR MEDS BY ELIG CLIN: HCPCS | Performed by: INTERNAL MEDICINE

## 2017-12-21 RX ORDER — LEVOTHYROXINE SODIUM 0.2 MG/1
TABLET ORAL
Qty: 90 TABLET | Refills: 1 | Status: SHIPPED | OUTPATIENT
Start: 2017-12-21 | End: 2018-02-05 | Stop reason: SDUPTHER

## 2017-12-21 RX ORDER — FAMOTIDINE 20 MG/1
20 TABLET, FILM COATED ORAL 2 TIMES DAILY
Qty: 60 TABLET | Refills: 2 | Status: ON HOLD | OUTPATIENT
Start: 2017-12-21 | End: 2022-06-06 | Stop reason: SDUPTHER

## 2017-12-21 NOTE — PROGRESS NOTES
Krishna Parent   YOB: 1955    Date of Visit:  12/21/2017    Chief Complaint   Patient presents with    Depression       HPI  Pt thinks Wellbutrin is helping a little. Pt states she has a little more energy and motivation. Pt states she has lost 6 lbs. Pt plans to check into [facility] for counseling      Review of Systems   Constitutional: Negative for appetite change, fatigue and unexpected weight change. Respiratory: Negative for chest tightness and shortness of breath. Cardiovascular: Negative for chest pain. Gastrointestinal: Negative for nausea and vomiting. Psychiatric/Behavioral: Negative for decreased concentration, dysphoric mood and sleep disturbance. The patient is not nervous/anxious. Allergies   Allergen Reactions    Cephalosporins      Cough and congestion    Dilaudid [Hydromorphone Hcl] Itching     Nervous, unable to sleep   [facility] [Ceftazidime]      Cough and congestion    Statins Other (See Comments)     Muscle cramping/weakness     Outpatient Prescriptions Marked as Taking for the 12/21/17 encounter (Office Visit) with Mitchel Hutchinson MD   Medication Sig Dispense Refill    buPROPion (WELLBUTRIN XL) 150 MG extended release tablet Take 1 tablet by mouth every morning 30 tablet 1    fluticasone (FLONASE) 50 MCG/ACT nasal spray 2 sprays by Nasal route daily 1 Bottle 0    pravastatin (PRAVACHOL) 80 MG tablet TAKE ONE TABLET BY MOUTH ONE TIME DAILY 90 tablet 1    FLUoxetine (PROZAC) 20 MG capsule TAKE 3 CAPSULES BY MOUTH DAILY 270 capsule 0    HYDROcodone-acetaminophen (NORCO) 5-325 MG per tablet Take 1 tablet by mouth every 6 hours as needed for Pain .  30 tablet 0    gabapentin (NEURONTIN) 100 MG capsule Take 1 capsule by mouth nightly 90 capsule 0    levothyroxine (SYNTHROID) 25 MCG tablet Take 1 tablet by mouth Daily in addition to 200mcg to equal 225mcg once daily 90 tablet 0    omeprazole (PRILOSEC) 40 MG delayed release capsule TAKE ONE CAPSULE BY MOUTH ONE TIME DAILY 90 capsule 1    amLODIPine (NORVASC) 10 MG tablet TAKE ONE TABLET BY MOUTH ONE TIME DAILY 90 tablet 1    promethazine (PHENERGAN) 25 MG tablet Take 1 tablet by mouth every 6 hours as needed for Nausea 15 tablet 0    famotidine (PEPCID) 20 MG tablet Take 1 tablet by mouth 2 times daily 30 tablet 0    vitamin D (CHOLECALCIFEROL) 5000 units CAPS capsule Take 5,000 Units by mouth daily      tiZANidine (ZANAFLEX) 2 MG tablet Take 1 tablet by mouth nightly 30 tablet 3    levothyroxine (SYNTHROID) 200 MCG tablet TAKE 1 TABLET BY MOUTH EVERY DAY 90 tablet 0    ALPRAZolam (XANAX) 0.25 MG tablet Take 1 tablet by mouth 2 times daily as needed for Anxiety 60 tablet 1    methocarbamol (ROBAXIN) 500 MG tablet Take 1 tablet by mouth 4 times daily as needed (Pain) 60 tablet 3    vitamin B-12 (CYANOCOBALAMIN) 1000 MCG tablet Take 1,000 mcg by mouth daily      Coenzyme Q10 (COQ-10 PO) Take by mouth daily      aspirin 81 MG EC tablet Take 81 mg by mouth daily      Calcium-Magnesium-Vitamin D 185- MG-MG-UNIT CAPS Take 1 capsule by mouth daily      acetaminophen (TYLENOL) 325 MG tablet Take 650 mg by mouth every 6 hours as needed for Pain      ibuprofen (ADVIL;MOTRIN) 200 MG tablet Take 400 mg by mouth every 6 hours as needed for Pain       therapeutic multivitamin-minerals (THERAGRAN-M) tablet Take 1 tablet by mouth daily.  Omega-3 Fatty Acids (FISH OIL) 1000 MG CAPS Take 1,000 mg by mouth daily. Vitals:    12/21/17 1351   BP: 128/76   Site: Right Arm   Position: Sitting   Cuff Size: Large Adult   Pulse: 79   Resp: 14   Temp: 98.4 °F (36.9 °C)   TempSrc: Oral   SpO2: 96%   Weight: (!) 319 lb (144.7 kg)   Height: 5' 8\" (1.727 m)     Body mass index is 48.5 kg/m². Physical Exam   Constitutional: She appears well-developed and well-nourished. No distress.    HENT:   Mouth/Throat: Oropharynx is clear and moist.   Eyes: EOM are normal. Pupils are equal, round, and

## 2017-12-26 ENCOUNTER — TELEPHONE (OUTPATIENT)
Dept: INTERNAL MEDICINE CLINIC | Age: 62
End: 2017-12-26

## 2017-12-26 ASSESSMENT — ENCOUNTER SYMPTOMS
VOMITING: 0
CHEST TIGHTNESS: 0
SHORTNESS OF BREATH: 0
NAUSEA: 0

## 2017-12-26 NOTE — TELEPHONE ENCOUNTER
Call pt. She needs to be seen. I recommend she see a [facility] Nurse practitioner or go to Urgent Care.

## 2017-12-27 ENCOUNTER — TELEPHONE (OUTPATIENT)
Dept: INTERNAL MEDICINE CLINIC | Age: 62
End: 2017-12-27

## 2017-12-27 ENCOUNTER — OFFICE VISIT (OUTPATIENT)
Dept: INTERNAL MEDICINE | Age: 62
End: 2017-12-27

## 2017-12-27 VITALS
TEMPERATURE: 98.5 F | WEIGHT: 293 LBS | HEART RATE: 81 BPM | DIASTOLIC BLOOD PRESSURE: 62 MMHG | HEIGHT: 68 IN | OXYGEN SATURATION: 96 % | BODY MASS INDEX: 44.41 KG/M2 | SYSTOLIC BLOOD PRESSURE: 122 MMHG

## 2017-12-27 DIAGNOSIS — B02.7 DISSEMINATED HERPES ZOSTER: Primary | ICD-10-CM

## 2017-12-27 PROCEDURE — 1036F TOBACCO NON-USER: CPT | Performed by: NURSE PRACTITIONER

## 2017-12-27 PROCEDURE — 99213 OFFICE O/P EST LOW 20 MIN: CPT | Performed by: NURSE PRACTITIONER

## 2017-12-27 PROCEDURE — G8427 DOCREV CUR MEDS BY ELIG CLIN: HCPCS | Performed by: NURSE PRACTITIONER

## 2017-12-27 PROCEDURE — 3014F SCREEN MAMMO DOC REV: CPT | Performed by: NURSE PRACTITIONER

## 2017-12-27 PROCEDURE — G8417 CALC BMI ABV UP PARAM F/U: HCPCS | Performed by: NURSE PRACTITIONER

## 2017-12-27 PROCEDURE — G8484 FLU IMMUNIZE NO ADMIN: HCPCS | Performed by: NURSE PRACTITIONER

## 2017-12-27 PROCEDURE — 3017F COLORECTAL CA SCREEN DOC REV: CPT | Performed by: NURSE PRACTITIONER

## 2017-12-27 RX ORDER — VALACYCLOVIR HYDROCHLORIDE 1 G/1
1000 TABLET, FILM COATED ORAL 3 TIMES DAILY
Qty: 15 TABLET | Refills: 0 | Status: SHIPPED | OUTPATIENT
Start: 2017-12-27 | End: 2018-01-01

## 2017-12-27 NOTE — PROGRESS NOTES
developing, pain and sensitivity ). Objective:     Vitals:    12/27/17 1438   BP: 122/62   Site: Left Arm   Position: Sitting   Cuff Size: Medium Adult   Pulse: 81   Temp: 98.5 °F (36.9 °C)   SpO2: 96%   Weight: (!) 318 lb (144.2 kg)   Height: 5' 8\" (1.727 m)     Physical Exam   Constitutional: She appears well-developed and well-nourished. Skin: Rash noted. Rash is vesicular (right eye brow, blisters forming ). Current Outpatient Prescriptions   Medication Sig Dispense Refill    valACYclovir (VALTREX) 1 g tablet Take 1 tablet by mouth 3 times daily for 5 days 15 tablet 0    levothyroxine (SYNTHROID) 200 MCG tablet TAKE 1 TABLET BY MOUTH EVERY DAY 90 tablet 1    famotidine (PEPCID) 20 MG tablet Take 1 tablet by mouth 2 times daily 60 tablet 2    buPROPion (WELLBUTRIN XL) 150 MG extended release tablet Take 1 tablet by mouth every morning 30 tablet 1    fluticasone (FLONASE) 50 MCG/ACT nasal spray 2 sprays by Nasal route daily 1 Bottle 0    pravastatin (PRAVACHOL) 80 MG tablet TAKE ONE TABLET BY MOUTH ONE TIME DAILY 90 tablet 1    FLUoxetine (PROZAC) 20 MG capsule TAKE 3 CAPSULES BY MOUTH DAILY 270 capsule 0    HYDROcodone-acetaminophen (NORCO) 5-325 MG per tablet Take 1 tablet by mouth every 6 hours as needed for Pain .  30 tablet 0    gabapentin (NEURONTIN) 100 MG capsule Take 1 capsule by mouth nightly 90 capsule 0    levothyroxine (SYNTHROID) 25 MCG tablet Take 1 tablet by mouth Daily in addition to 200mcg to equal 225mcg once daily 90 tablet 0    omeprazole (PRILOSEC) 40 MG delayed release capsule TAKE ONE CAPSULE BY MOUTH ONE TIME DAILY 90 capsule 1    amLODIPine (NORVASC) 10 MG tablet TAKE ONE TABLET BY MOUTH ONE TIME DAILY 90 tablet 1    promethazine (PHENERGAN) 25 MG tablet Take 1 tablet by mouth every 6 hours as needed for Nausea 15 tablet 0    vitamin D (CHOLECALCIFEROL) 5000 units CAPS capsule Take 5,000 Units by mouth daily      tiZANidine (ZANAFLEX) 2 MG tablet Take 1 tablet by mouth nightly 30 tablet 3    ALPRAZolam (XANAX) 0.25 MG tablet Take 1 tablet by mouth 2 times daily as needed for Anxiety 60 tablet 1    methocarbamol (ROBAXIN) 500 MG tablet Take 1 tablet by mouth 4 times daily as needed (Pain) 60 tablet 3    vitamin B-12 (CYANOCOBALAMIN) 1000 MCG tablet Take 1,000 mcg by mouth daily      Coenzyme Q10 (COQ-10 PO) Take by mouth daily      aspirin 81 MG EC tablet Take 81 mg by mouth daily      Calcium-Magnesium-Vitamin D 185- MG-MG-UNIT CAPS Take 1 capsule by mouth daily      acetaminophen (TYLENOL) 325 MG tablet Take 650 mg by mouth every 6 hours as needed for Pain      ibuprofen (ADVIL;MOTRIN) 200 MG tablet Take 400 mg by mouth every 6 hours as needed for Pain       therapeutic multivitamin-minerals (THERAGRAN-M) tablet Take 1 tablet by mouth daily.  Omega-3 Fatty Acids (FISH OIL) 1000 MG CAPS Take 1,000 mg by mouth daily. No current facility-administered medications for this visit. Assessment:     1. Disseminated herpes zoster      Plan:   1. Disseminated herpes zoster  - valACYclovir (VALTREX) 1 g tablet; Take 1 tablet by mouth 3 times daily for 5 days  Dispense: 15 tablet; Refill: 0        Discussed use, benefit, and side effects of all prescribed medications. Barriers to medication compliance addressed. All patient questions answered. Pt voiced understanding. All patient questions answered. Pt voiced understanding.

## 2018-01-03 RX ORDER — AMLODIPINE BESYLATE 10 MG/1
TABLET ORAL
Qty: 90 TABLET | Refills: 1 | Status: SHIPPED | OUTPATIENT
Start: 2018-01-03 | End: 2018-04-18 | Stop reason: SDUPTHER

## 2018-01-08 DIAGNOSIS — M79.7 FIBROMYALGIA: ICD-10-CM

## 2018-01-08 DIAGNOSIS — B02.9 HERPES ZOSTER WITHOUT COMPLICATION: Primary | ICD-10-CM

## 2018-01-08 NOTE — TELEPHONE ENCOUNTER
Still having pain from her shingles wants to know if she can get a refill on her Vicodin  [facility] [phone]

## 2018-01-11 NOTE — TELEPHONE ENCOUNTER
Patint calling back to check the status of her Vicodin refill for the shingles.  States she has a couple left and doesn't want to go thru the weekend without

## 2018-01-12 RX ORDER — HYDROCODONE BITARTRATE AND ACETAMINOPHEN 5; 325 MG/1; MG/1
1 TABLET ORAL EVERY 6 HOURS PRN
Qty: 30 TABLET | Refills: 0 | Status: SHIPPED | OUTPATIENT
Start: 2018-01-12 | End: 2018-04-05 | Stop reason: SDUPTHER

## 2018-01-12 NOTE — TELEPHONE ENCOUNTER
Call pt. I refilled her Rx for Norco.    Controlled Substances Monitoring:     Attestation: The Prescription Monitoring Report for this patient was reviewed today. Ayd Sun MD)  Documentation: No signs of potential drug abuse or diversion identified.  Ady Sun MD)

## 2018-01-19 DIAGNOSIS — F33.41 RECURRENT MAJOR DEPRESSIVE DISORDER, IN PARTIAL REMISSION (HCC): ICD-10-CM

## 2018-01-19 RX ORDER — BUPROPION HYDROCHLORIDE 150 MG/1
150 TABLET ORAL EVERY MORNING
Qty: 30 TABLET | Refills: 3 | Status: SHIPPED | OUTPATIENT
Start: 2018-01-19 | End: 2018-02-22 | Stop reason: DRUGHIGH

## 2018-01-24 DIAGNOSIS — F41.9 ANXIETY: ICD-10-CM

## 2018-01-24 RX ORDER — ALPRAZOLAM 0.25 MG/1
0.25 TABLET ORAL 2 TIMES DAILY PRN
Qty: 60 TABLET | Refills: 1 | Status: SHIPPED | OUTPATIENT
Start: 2018-01-24 | End: 2018-06-06 | Stop reason: SDUPTHER

## 2018-01-29 DIAGNOSIS — F32.0 MAJOR DEPRESSIVE DISORDER, SINGLE EPISODE, MILD (HCC): ICD-10-CM

## 2018-01-29 RX ORDER — FLUOXETINE HYDROCHLORIDE 20 MG/1
CAPSULE ORAL
Qty: 270 CAPSULE | Refills: 1 | Status: SHIPPED | OUTPATIENT
Start: 2018-01-29 | End: 2018-07-28 | Stop reason: SDUPTHER

## 2018-02-02 DIAGNOSIS — E03.9 ACQUIRED HYPOTHYROIDISM: ICD-10-CM

## 2018-02-02 RX ORDER — LEVOTHYROXINE SODIUM 0.03 MG/1
25 TABLET ORAL DAILY
Qty: 90 TABLET | Refills: 1 | Status: SHIPPED | OUTPATIENT
Start: 2018-02-02 | End: 2018-08-01 | Stop reason: SDUPTHER

## 2018-02-05 RX ORDER — LEVOTHYROXINE SODIUM 0.2 MG/1
TABLET ORAL
Qty: 90 TABLET | Refills: 1 | Status: SHIPPED | OUTPATIENT
Start: 2018-02-05 | End: 2018-09-09 | Stop reason: SDUPTHER

## 2018-02-22 ENCOUNTER — OFFICE VISIT (OUTPATIENT)
Dept: INTERNAL MEDICINE CLINIC | Age: 63
End: 2018-02-22

## 2018-02-22 VITALS
HEIGHT: 68 IN | DIASTOLIC BLOOD PRESSURE: 70 MMHG | HEART RATE: 80 BPM | SYSTOLIC BLOOD PRESSURE: 138 MMHG | OXYGEN SATURATION: 97 % | BODY MASS INDEX: 44.41 KG/M2 | WEIGHT: 293 LBS

## 2018-02-22 DIAGNOSIS — F41.9 ANXIETY: ICD-10-CM

## 2018-02-22 DIAGNOSIS — R53.83 FATIGUE, UNSPECIFIED TYPE: ICD-10-CM

## 2018-02-22 DIAGNOSIS — E66.01 MORBID OBESITY WITH BMI OF 45.0-49.9, ADULT (HCC): ICD-10-CM

## 2018-02-22 DIAGNOSIS — M79.7 FIBROMYALGIA: ICD-10-CM

## 2018-02-22 DIAGNOSIS — I10 ESSENTIAL HYPERTENSION: ICD-10-CM

## 2018-02-22 DIAGNOSIS — R25.1 TREMOR: ICD-10-CM

## 2018-02-22 DIAGNOSIS — E03.9 ACQUIRED HYPOTHYROIDISM: ICD-10-CM

## 2018-02-22 DIAGNOSIS — F33.41 RECURRENT MAJOR DEPRESSIVE DISORDER IN PARTIAL REMISSION (HCC): ICD-10-CM

## 2018-02-22 DIAGNOSIS — M54.6 LEFT-SIDED THORACIC BACK PAIN, UNSPECIFIED CHRONICITY: ICD-10-CM

## 2018-02-22 DIAGNOSIS — F33.41 RECURRENT MAJOR DEPRESSIVE DISORDER, IN PARTIAL REMISSION (HCC): Primary | ICD-10-CM

## 2018-02-22 LAB
A/G RATIO: 1.6 (ref 1.1–2.2)
ALBUMIN SERPL-MCNC: 4.3 G/DL (ref 3.4–5)
ALP BLD-CCNC: 67 U/L (ref 40–129)
ALT SERPL-CCNC: 14 U/L (ref 10–40)
ANION GAP SERPL CALCULATED.3IONS-SCNC: 16 MMOL/L (ref 3–16)
AST SERPL-CCNC: 15 U/L (ref 15–37)
BASOPHILS ABSOLUTE: 0 K/UL (ref 0–0.2)
BASOPHILS RELATIVE PERCENT: 0.6 %
BILIRUB SERPL-MCNC: 0.3 MG/DL (ref 0–1)
BUN BLDV-MCNC: 14 MG/DL (ref 7–20)
CALCIUM SERPL-MCNC: 9.3 MG/DL (ref 8.3–10.6)
CHLORIDE BLD-SCNC: 100 MMOL/L (ref 99–110)
CO2: 26 MMOL/L (ref 21–32)
CREAT SERPL-MCNC: 0.9 MG/DL (ref 0.6–1.2)
EOSINOPHILS ABSOLUTE: 0.2 K/UL (ref 0–0.6)
EOSINOPHILS RELATIVE PERCENT: 3 %
GFR AFRICAN AMERICAN: >60
GFR NON-AFRICAN AMERICAN: >60
GLOBULIN: 2.7 G/DL
GLUCOSE BLD-MCNC: 111 MG/DL (ref 70–99)
HCT VFR BLD CALC: 39.3 % (ref 36–48)
HEMOGLOBIN: 12.6 G/DL (ref 12–16)
LYMPHOCYTES ABSOLUTE: 2.2 K/UL (ref 1–5.1)
LYMPHOCYTES RELATIVE PERCENT: 26.7 %
MCH RBC QN AUTO: 24.6 PG (ref 26–34)
MCHC RBC AUTO-ENTMCNC: 32.1 G/DL (ref 31–36)
MCV RBC AUTO: 76.6 FL (ref 80–100)
MONOCYTES ABSOLUTE: 0.4 K/UL (ref 0–1.3)
MONOCYTES RELATIVE PERCENT: 4.8 %
NEUTROPHILS ABSOLUTE: 5.5 K/UL (ref 1.7–7.7)
NEUTROPHILS RELATIVE PERCENT: 64.9 %
PDW BLD-RTO: 17.8 % (ref 12.4–15.4)
PLATELET # BLD: 423 K/UL (ref 135–450)
PMV BLD AUTO: 8.5 FL (ref 5–10.5)
POTASSIUM SERPL-SCNC: 4.6 MMOL/L (ref 3.5–5.1)
RBC # BLD: 5.13 M/UL (ref 4–5.2)
SODIUM BLD-SCNC: 142 MMOL/L (ref 136–145)
TOTAL PROTEIN: 7 G/DL (ref 6.4–8.2)
TSH SERPL DL<=0.05 MIU/L-ACNC: 2.13 UIU/ML (ref 0.27–4.2)
VITAMIN B-12: 522 PG/ML (ref 211–911)
WBC # BLD: 8.4 K/UL (ref 4–11)

## 2018-02-22 PROCEDURE — 3017F COLORECTAL CA SCREEN DOC REV: CPT | Performed by: INTERNAL MEDICINE

## 2018-02-22 PROCEDURE — G8427 DOCREV CUR MEDS BY ELIG CLIN: HCPCS | Performed by: INTERNAL MEDICINE

## 2018-02-22 PROCEDURE — G8417 CALC BMI ABV UP PARAM F/U: HCPCS | Performed by: INTERNAL MEDICINE

## 2018-02-22 PROCEDURE — 1036F TOBACCO NON-USER: CPT | Performed by: INTERNAL MEDICINE

## 2018-02-22 PROCEDURE — 3014F SCREEN MAMMO DOC REV: CPT | Performed by: INTERNAL MEDICINE

## 2018-02-22 PROCEDURE — G8484 FLU IMMUNIZE NO ADMIN: HCPCS | Performed by: INTERNAL MEDICINE

## 2018-02-22 PROCEDURE — 99214 OFFICE O/P EST MOD 30 MIN: CPT | Performed by: INTERNAL MEDICINE

## 2018-02-22 RX ORDER — IBUPROFEN 600 MG/1
600 TABLET ORAL 3 TIMES DAILY PRN
Qty: 30 TABLET | Refills: 0 | Status: SHIPPED | OUTPATIENT
Start: 2018-02-22 | End: 2021-06-24

## 2018-02-22 RX ORDER — BUPROPION HYDROCHLORIDE 300 MG/1
300 TABLET ORAL EVERY MORNING
Qty: 30 TABLET | Refills: 1 | Status: SHIPPED | OUTPATIENT
Start: 2018-02-22 | End: 2018-05-06 | Stop reason: SDUPTHER

## 2018-02-22 NOTE — PATIENT INSTRUCTIONS
-Continue same medications  -Increase Wellbutrin XL to 300mg once daily  -Ibuprofen 600mg 3 times daily as needed  -Robaxin 500mg 2-3 times daily as needed  -Low fat, low cholesterol diet  -Low sodium diet

## 2018-02-22 NOTE — PROGRESS NOTES
Leroy Patel   YOB: 1955    Date of Visit:  2/22/2018    Chief Complaint   Patient presents with    Depression    Back Pain     left side near kidneys    Chronic Pain    3 Month Follow-Up       HPI  appt with Neurology 3/9/17    Not taking gabapentin nightly    Feels like she is going to jump out of her skin and gets shaky takes xanax if bad    Fibromyalgia  norco as needed    Depression-no counseling talked to  once every couple weeks [facility]    wellbutrin felt good for 3 weeks and no is back to depressed    Tremor comes and goes R>L if holding phone and putting elbow on flat surface  Off pravastatin for leg pain pain better off it   Still gets weakness    Left thoracic back pain since last night went to turn over and felt it. Stabbing pain with movement. 6-7 out of 10 pain    Stressful couple of days cousin had open heart surgery  Fatigue for awhile    Review of Systems   Constitutional: Positive for fatigue. Negative for appetite change, chills, fever and unexpected weight change. HENT: Negative for congestion, postnasal drip, rhinorrhea, sinus pressure, sore throat and trouble swallowing. Eyes: Negative for visual disturbance. Respiratory: Negative for cough, chest tightness, shortness of breath and wheezing. Cardiovascular: Negative for chest pain, palpitations and leg swelling. Gastrointestinal: Negative for abdominal pain, blood in stool, constipation, diarrhea, nausea and vomiting. Endocrine: Negative for cold intolerance and heat intolerance. Genitourinary: Negative for dysuria, frequency and hematuria. Musculoskeletal: Positive for back pain and myalgias. Negative for arthralgias. Skin: Negative for rash. Neurological: Positive for tremors. Negative for dizziness, syncope, weakness, light-headedness, numbness and headaches. Psychiatric/Behavioral: Positive for dysphoric mood.  Negative for decreased concentration and sleep Myah Mayfield MD             Return in about 2 weeks (around 3/8/2018) for back pain and depression.

## 2018-02-27 ASSESSMENT — ENCOUNTER SYMPTOMS
SORE THROAT: 0
ABDOMINAL PAIN: 0
CHEST TIGHTNESS: 0
BACK PAIN: 1
SINUS PRESSURE: 0
NAUSEA: 0
BLOOD IN STOOL: 0
RHINORRHEA: 0
CONSTIPATION: 0
DIARRHEA: 0
SHORTNESS OF BREATH: 0
VOMITING: 0
TROUBLE SWALLOWING: 0
COUGH: 0
WHEEZING: 0

## 2018-03-08 ENCOUNTER — OFFICE VISIT (OUTPATIENT)
Dept: INTERNAL MEDICINE CLINIC | Age: 63
End: 2018-03-08

## 2018-03-08 VITALS
HEIGHT: 68 IN | BODY MASS INDEX: 44.41 KG/M2 | SYSTOLIC BLOOD PRESSURE: 138 MMHG | OXYGEN SATURATION: 98 % | HEART RATE: 79 BPM | DIASTOLIC BLOOD PRESSURE: 86 MMHG | WEIGHT: 293 LBS

## 2018-03-08 DIAGNOSIS — M54.6 LEFT-SIDED THORACIC BACK PAIN, UNSPECIFIED CHRONICITY: Primary | ICD-10-CM

## 2018-03-08 DIAGNOSIS — F33.41 RECURRENT MAJOR DEPRESSIVE DISORDER, IN PARTIAL REMISSION (HCC): ICD-10-CM

## 2018-03-08 PROCEDURE — 3014F SCREEN MAMMO DOC REV: CPT | Performed by: INTERNAL MEDICINE

## 2018-03-08 PROCEDURE — 1036F TOBACCO NON-USER: CPT | Performed by: INTERNAL MEDICINE

## 2018-03-08 PROCEDURE — 99213 OFFICE O/P EST LOW 20 MIN: CPT | Performed by: INTERNAL MEDICINE

## 2018-03-08 PROCEDURE — G8427 DOCREV CUR MEDS BY ELIG CLIN: HCPCS | Performed by: INTERNAL MEDICINE

## 2018-03-08 PROCEDURE — 3017F COLORECTAL CA SCREEN DOC REV: CPT | Performed by: INTERNAL MEDICINE

## 2018-03-08 PROCEDURE — G8417 CALC BMI ABV UP PARAM F/U: HCPCS | Performed by: INTERNAL MEDICINE

## 2018-03-08 PROCEDURE — G8482 FLU IMMUNIZE ORDER/ADMIN: HCPCS | Performed by: INTERNAL MEDICINE

## 2018-03-08 NOTE — PROGRESS NOTES
Samuel Domínguez   YOB: 1955    Date of Visit:  3/8/2018    Chief Complaint   Patient presents with    Depression    Back Pain    Other     Follow-up       HPI  Combination of Advil and Methocarbamol helps. And back pain pretty much gone    Depression - increase in wellbutrin little difference little more active getting more done around house and walking dog    Review of Systems   Constitutional: Negative for activity change, appetite change, chills, fatigue, fever and unexpected weight change. Respiratory: Negative for chest tightness and shortness of breath. Cardiovascular: Negative for chest pain. Gastrointestinal: Negative for abdominal pain, nausea and vomiting. Genitourinary: Negative for dysuria, flank pain, frequency and hematuria. Musculoskeletal: Positive for back pain. Negative for gait problem and joint swelling. Skin: Negative for rash. Neurological: Negative for weakness and numbness. Psychiatric/Behavioral: Positive for dysphoric mood. Negative for decreased concentration and sleep disturbance. The patient is nervous/anxious.         Allergies   Allergen Reactions    Cephalosporins      Cough and congestion    Dilaudid [Hydromorphone Hcl] Itching     Nervous, unable to sleep   [facility] [Ceftazidime]      Cough and congestion    Statins Other (See Comments)     Muscle cramping/weakness     Outpatient Prescriptions Marked as Taking for the 3/8/18 encounter (Office Visit) with Aida Loja MD   Medication Sig Dispense Refill    buPROPion (WELLBUTRIN XL) 300 MG extended release tablet Take 1 tablet by mouth every morning 30 tablet 1    ibuprofen (ADVIL;MOTRIN) 600 MG tablet Take 1 tablet by mouth 3 times daily as needed for Pain 30 tablet 0    levothyroxine (SYNTHROID) 200 MCG tablet TAKE 1 TABLET BY MOUTH EVERY DAY 90 tablet 1    levothyroxine (SYNTHROID) 25 MCG tablet TAKE 1 TABLET BY MOUTH DAILY IN ADDITION TO 200MCG TO EQUAL 225MCG ONCE DAILY 90 tablet

## 2018-03-13 ASSESSMENT — ENCOUNTER SYMPTOMS
BACK PAIN: 1
CHEST TIGHTNESS: 0
ABDOMINAL PAIN: 0
SHORTNESS OF BREATH: 0
NAUSEA: 0
VOMITING: 0

## 2018-03-16 RX ORDER — TIZANIDINE 2 MG/1
2 TABLET ORAL NIGHTLY
Qty: 30 TABLET | Refills: 2 | Status: SHIPPED | OUTPATIENT
Start: 2018-03-16 | End: 2018-08-15 | Stop reason: SDUPTHER

## 2018-03-21 ENCOUNTER — TELEPHONE (OUTPATIENT)
Dept: FAMILY MEDICINE CLINIC | Age: 63
End: 2018-03-21

## 2018-03-23 ENCOUNTER — HOSPITAL ENCOUNTER (OUTPATIENT)
Dept: MRI IMAGING | Age: 63
Discharge: HOME | End: 2018-03-23
Attending: PSYCHIATRY & NEUROLOGY | Admitting: PSYCHIATRY & NEUROLOGY

## 2018-03-23 DIAGNOSIS — R25.1 TREMOR: ICD-10-CM

## 2018-03-23 DIAGNOSIS — M79.7 FIBROMYALGIA: ICD-10-CM

## 2018-04-04 ENCOUNTER — TELEPHONE (OUTPATIENT)
Dept: INTERNAL MEDICINE CLINIC | Age: 63
End: 2018-04-04

## 2018-04-04 DIAGNOSIS — M79.7 FIBROMYALGIA: ICD-10-CM

## 2018-04-05 RX ORDER — HYDROCODONE BITARTRATE AND ACETAMINOPHEN 5; 325 MG/1; MG/1
1 TABLET ORAL EVERY 6 HOURS PRN
Qty: 30 TABLET | Refills: 0 | Status: SHIPPED | OUTPATIENT
Start: 2018-04-05 | End: 2018-05-05

## 2018-04-09 ENCOUNTER — TELEPHONE (OUTPATIENT)
Dept: INTERNAL MEDICINE CLINIC | Age: 63
End: 2018-04-09

## 2018-04-12 ENCOUNTER — TELEPHONE (OUTPATIENT)
Dept: INTERNAL MEDICINE CLINIC | Age: 63
End: 2018-04-12

## 2018-04-16 RX ORDER — OMEPRAZOLE 40 MG/1
CAPSULE, DELAYED RELEASE ORAL
Qty: 90 CAPSULE | Refills: 1 | Status: SHIPPED | OUTPATIENT
Start: 2018-04-16

## 2018-04-18 RX ORDER — AMLODIPINE BESYLATE 10 MG/1
TABLET ORAL
Qty: 90 TABLET | Refills: 0 | Status: SHIPPED | OUTPATIENT
Start: 2018-04-18 | End: 2018-07-12 | Stop reason: SDUPTHER

## 2018-04-26 RX ORDER — METHOCARBAMOL 500 MG/1
500 TABLET, FILM COATED ORAL 4 TIMES DAILY PRN
Qty: 60 TABLET | Refills: 2 | Status: SHIPPED | OUTPATIENT
Start: 2018-04-26

## 2018-05-06 DIAGNOSIS — F33.41 RECURRENT MAJOR DEPRESSIVE DISORDER, IN PARTIAL REMISSION (HCC): ICD-10-CM

## 2018-05-07 RX ORDER — BUPROPION HYDROCHLORIDE 300 MG/1
300 TABLET ORAL EVERY MORNING
Qty: 30 TABLET | Refills: 1 | Status: ON HOLD | OUTPATIENT
Start: 2018-05-07 | End: 2022-06-05 | Stop reason: HOSPADM

## 2018-05-22 ENCOUNTER — OFFICE VISIT (OUTPATIENT)
Dept: INTERNAL MEDICINE CLINIC | Age: 63
End: 2018-05-22

## 2018-05-22 VITALS
DIASTOLIC BLOOD PRESSURE: 70 MMHG | SYSTOLIC BLOOD PRESSURE: 138 MMHG | OXYGEN SATURATION: 93 % | BODY MASS INDEX: 44.41 KG/M2 | HEART RATE: 81 BPM | HEIGHT: 68 IN | WEIGHT: 293 LBS

## 2018-05-22 DIAGNOSIS — M79.7 FIBROMYALGIA: ICD-10-CM

## 2018-05-22 DIAGNOSIS — R73.03 PREDIABETES: ICD-10-CM

## 2018-05-22 DIAGNOSIS — I10 ESSENTIAL HYPERTENSION: ICD-10-CM

## 2018-05-22 DIAGNOSIS — R53.83 FATIGUE, UNSPECIFIED TYPE: ICD-10-CM

## 2018-05-22 DIAGNOSIS — F41.9 ANXIETY: ICD-10-CM

## 2018-05-22 DIAGNOSIS — E03.9 ACQUIRED HYPOTHYROIDISM: Primary | ICD-10-CM

## 2018-05-22 DIAGNOSIS — E78.2 MIXED HYPERLIPIDEMIA: ICD-10-CM

## 2018-05-22 DIAGNOSIS — F33.41 RECURRENT MAJOR DEPRESSIVE DISORDER, IN PARTIAL REMISSION (HCC): ICD-10-CM

## 2018-05-22 PROCEDURE — 3017F COLORECTAL CA SCREEN DOC REV: CPT | Performed by: INTERNAL MEDICINE

## 2018-05-22 PROCEDURE — 1036F TOBACCO NON-USER: CPT | Performed by: INTERNAL MEDICINE

## 2018-05-22 PROCEDURE — G8417 CALC BMI ABV UP PARAM F/U: HCPCS | Performed by: INTERNAL MEDICINE

## 2018-05-22 PROCEDURE — 99214 OFFICE O/P EST MOD 30 MIN: CPT | Performed by: INTERNAL MEDICINE

## 2018-05-22 PROCEDURE — G8427 DOCREV CUR MEDS BY ELIG CLIN: HCPCS | Performed by: INTERNAL MEDICINE

## 2018-05-22 ASSESSMENT — ENCOUNTER SYMPTOMS
COUGH: 0
RHINORRHEA: 0
CHEST TIGHTNESS: 0
SORE THROAT: 0
SHORTNESS OF BREATH: 0
CONSTIPATION: 0
VOMITING: 0
NAUSEA: 0
DIARRHEA: 0
ABDOMINAL PAIN: 0

## 2018-05-22 ASSESSMENT — PATIENT HEALTH QUESTIONNAIRE - PHQ9
SUM OF ALL RESPONSES TO PHQ QUESTIONS 1-9: 0
SUM OF ALL RESPONSES TO PHQ9 QUESTIONS 1 & 2: 0
1. LITTLE INTEREST OR PLEASURE IN DOING THINGS: 0
2. FEELING DOWN, DEPRESSED OR HOPELESS: 0

## 2018-06-06 DIAGNOSIS — F41.9 ANXIETY: ICD-10-CM

## 2018-06-07 RX ORDER — ALPRAZOLAM 0.25 MG/1
TABLET ORAL
Qty: 60 TABLET | Refills: 1 | Status: SHIPPED | OUTPATIENT
Start: 2018-06-07 | End: 2018-08-06

## 2018-06-19 ENCOUNTER — TELEPHONE (OUTPATIENT)
Dept: INTERNAL MEDICINE CLINIC | Age: 63
End: 2018-06-19

## 2018-06-22 ENCOUNTER — TELEPHONE (OUTPATIENT)
Dept: INTERNAL MEDICINE CLINIC | Age: 63
End: 2018-06-22

## 2018-07-10 ENCOUNTER — TELEPHONE (OUTPATIENT)
Dept: INTERNAL MEDICINE CLINIC | Age: 63
End: 2018-07-10

## 2018-07-13 ENCOUNTER — TELEPHONE (OUTPATIENT)
Dept: INTERNAL MEDICINE CLINIC | Age: 63
End: 2018-07-13

## 2018-07-13 RX ORDER — AMLODIPINE BESYLATE 10 MG/1
TABLET ORAL
Qty: 90 TABLET | Refills: 1 | Status: SHIPPED | OUTPATIENT
Start: 2018-07-13 | End: 2021-02-25 | Stop reason: SDUPTHER

## 2018-07-13 NOTE — TELEPHONE ENCOUNTER
Pt called office, request an update regarding housing form that was dropped off. Pt states form was dropped off 3 weeks ago  Pt informed Dr. Tahira Avila has been out of the office and will be notified of her call. Pt states she will ask to have another form faxed to the office today. Pt needs form filled out ASAP due to needing to move and cannot without the $1000 needed from the lease agreement.     Please advise if Form has already been completed     Pt would like notified as soon a new form arrives/completed form is ready to be picked up

## 2018-07-20 ENCOUNTER — TELEPHONE (OUTPATIENT)
Dept: INTERNAL MEDICINE CLINIC | Age: 63
End: 2018-07-20

## 2018-07-26 NOTE — TELEPHONE ENCOUNTER
Left message for pt to call back if she still had concerns regarding her forms being completed. Dr Elaine Villegas completed them and faxed to her apartment complex.

## 2018-07-28 DIAGNOSIS — F32.0 MAJOR DEPRESSIVE DISORDER, SINGLE EPISODE, MILD (HCC): ICD-10-CM

## 2018-07-30 RX ORDER — FLUOXETINE HYDROCHLORIDE 20 MG/1
CAPSULE ORAL
Qty: 270 CAPSULE | Refills: 0 | Status: SHIPPED | OUTPATIENT
Start: 2018-07-30 | End: 2020-12-31 | Stop reason: ALTCHOICE

## 2018-08-14 ENCOUNTER — OFFICE VISIT (OUTPATIENT)
Dept: INTERNAL MEDICINE | Age: 63
End: 2018-08-14

## 2018-08-14 VITALS
HEART RATE: 75 BPM | DIASTOLIC BLOOD PRESSURE: 92 MMHG | OXYGEN SATURATION: 95 % | WEIGHT: 293 LBS | BODY MASS INDEX: 44.41 KG/M2 | SYSTOLIC BLOOD PRESSURE: 142 MMHG | HEIGHT: 68 IN

## 2018-08-14 DIAGNOSIS — B02.29 POST HERPETIC NEURALGIA: Primary | ICD-10-CM

## 2018-08-14 PROCEDURE — 3017F COLORECTAL CA SCREEN DOC REV: CPT | Performed by: NURSE PRACTITIONER

## 2018-08-14 PROCEDURE — 1036F TOBACCO NON-USER: CPT | Performed by: NURSE PRACTITIONER

## 2018-08-14 PROCEDURE — G8427 DOCREV CUR MEDS BY ELIG CLIN: HCPCS | Performed by: NURSE PRACTITIONER

## 2018-08-14 PROCEDURE — 99213 OFFICE O/P EST LOW 20 MIN: CPT | Performed by: NURSE PRACTITIONER

## 2018-08-14 PROCEDURE — G8417 CALC BMI ABV UP PARAM F/U: HCPCS | Performed by: NURSE PRACTITIONER

## 2018-08-14 PROCEDURE — G8510 SCR DEP NEG, NO PLAN REQD: HCPCS | Performed by: NURSE PRACTITIONER

## 2018-08-14 RX ORDER — PREGABALIN 25 MG/1
25 CAPSULE ORAL 2 TIMES DAILY
Qty: 60 CAPSULE | Refills: 3 | Status: SHIPPED | OUTPATIENT
Start: 2018-08-14 | End: 2018-09-04 | Stop reason: SDUPTHER

## 2018-08-14 ASSESSMENT — ENCOUNTER SYMPTOMS
SHORTNESS OF BREATH: 0
COUGH: 0
DIARRHEA: 0
CHEST TIGHTNESS: 0
BACK PAIN: 0
COLOR CHANGE: 0
ABDOMINAL PAIN: 0
RHINORRHEA: 0
EYE REDNESS: 0
CONSTIPATION: 0
SINUS PRESSURE: 0
VOMITING: 0
EYE ITCHING: 0
NAUSEA: 0
SORE THROAT: 0
BLOOD IN STOOL: 0
WHEEZING: 0

## 2018-08-14 ASSESSMENT — PATIENT HEALTH QUESTIONNAIRE - PHQ9
2. FEELING DOWN, DEPRESSED OR HOPELESS: 0
SUM OF ALL RESPONSES TO PHQ9 QUESTIONS 1 & 2: 0
1. LITTLE INTEREST OR PLEASURE IN DOING THINGS: 0
SUM OF ALL RESPONSES TO PHQ QUESTIONS 1-9: 0
SUM OF ALL RESPONSES TO PHQ QUESTIONS 1-9: 0

## 2018-08-14 NOTE — PROGRESS NOTES
Neurological: She is alert and oriented to person, place, and time. She has normal reflexes. A sensory deficit is present. Right side    Skin: Skin is warm and dry. Psychiatric: She has a normal mood and affect. Her behavior is normal.       ASSESSMENT/PLAN:  1. Post herpetic neuralgia  - no visible rash seen, believe post herpetic neuralgia, start lyrica. - if rash develops, call office  - pregabalin (LYRICA) 25 MG capsule; Take 1 capsule by mouth 2 times daily for 31 days. .  Dispense: 60 capsule; Refill: 3      No Follow-up on file. An electronic signature was used to authenticate this note.     --AQUILES Soto - CNP on 8/14/2018 at 2:17 PM

## 2018-08-15 NOTE — TELEPHONE ENCOUNTER
Medication:   Requested Prescriptions     Pending Prescriptions Disp Refills    tiZANidine (ZANAFLEX) 2 MG tablet [Pharmacy Med Name: TIZANIDINE HCL 2 MG TABLET] 30 tablet 2     Sig: TAKE 1 TABLET BY MOUTH NIGHTLY        Last Filled:      Patient Phone Number: [phone] (home) [phone] (work)    Last appt: 5/22/2018   Next appt: 8/21/2018    Last OARRS:   RX Monitoring 5/22/2018   Attestation The Prescription Monitoring Report for this patient was reviewed today. Documentation No signs of potential drug abuse or diversion identified. Medication Contracts Medication contract signed today. Preferred Pharmacy:   [address] [facility]  [address]   [place] [address]  Phone: [phone] Fax: [phone]    [place], [place] - [address] [facility] [phone] Janet Arellano [phone]  [address] [place] [address]  Phone: [phone] Fax: [phone]

## 2018-08-17 RX ORDER — TIZANIDINE 2 MG/1
2 TABLET ORAL NIGHTLY
Qty: 30 TABLET | Refills: 0 | Status: SHIPPED | OUTPATIENT
Start: 2018-08-17 | End: 2018-10-10 | Stop reason: SDUPTHER

## 2018-09-04 ENCOUNTER — OFFICE VISIT (OUTPATIENT)
Dept: INTERNAL MEDICINE | Age: 63
End: 2018-09-04

## 2018-09-04 VITALS
HEIGHT: 68 IN | BODY MASS INDEX: 44.41 KG/M2 | OXYGEN SATURATION: 95 % | DIASTOLIC BLOOD PRESSURE: 88 MMHG | SYSTOLIC BLOOD PRESSURE: 136 MMHG | WEIGHT: 293 LBS | HEART RATE: 72 BPM

## 2018-09-04 DIAGNOSIS — Z00.00 ROUTINE GENERAL MEDICAL EXAMINATION AT A HEALTH CARE FACILITY: Primary | ICD-10-CM

## 2018-09-04 DIAGNOSIS — B02.29 POST HERPETIC NEURALGIA: ICD-10-CM

## 2018-09-04 PROCEDURE — G8427 DOCREV CUR MEDS BY ELIG CLIN: HCPCS | Performed by: NURSE PRACTITIONER

## 2018-09-04 PROCEDURE — G8400 PT W/DXA NO RESULTS DOC: HCPCS | Performed by: NURSE PRACTITIONER

## 2018-09-04 PROCEDURE — 4040F PNEUMOC VAC/ADMIN/RCVD: CPT | Performed by: NURSE PRACTITIONER

## 2018-09-04 PROCEDURE — 1123F ACP DISCUSS/DSCN MKR DOCD: CPT | Performed by: NURSE PRACTITIONER

## 2018-09-04 PROCEDURE — 3017F COLORECTAL CA SCREEN DOC REV: CPT | Performed by: NURSE PRACTITIONER

## 2018-09-04 PROCEDURE — 1090F PRES/ABSN URINE INCON ASSESS: CPT | Performed by: NURSE PRACTITIONER

## 2018-09-04 PROCEDURE — 1101F PT FALLS ASSESS-DOCD LE1/YR: CPT | Performed by: NURSE PRACTITIONER

## 2018-09-04 PROCEDURE — 1036F TOBACCO NON-USER: CPT | Performed by: NURSE PRACTITIONER

## 2018-09-04 PROCEDURE — 99213 OFFICE O/P EST LOW 20 MIN: CPT | Performed by: NURSE PRACTITIONER

## 2018-09-04 PROCEDURE — G8417 CALC BMI ABV UP PARAM F/U: HCPCS | Performed by: NURSE PRACTITIONER

## 2018-09-04 RX ORDER — PREGABALIN 50 MG/1
50 CAPSULE ORAL 2 TIMES DAILY
Qty: 60 CAPSULE | Refills: 3 | Status: ON HOLD | OUTPATIENT
Start: 2018-09-04 | End: 2022-06-06

## 2018-09-04 ASSESSMENT — ENCOUNTER SYMPTOMS
NAUSEA: 0
SORE THROAT: 0
COUGH: 0
EYE REDNESS: 0
EYE ITCHING: 0
BACK PAIN: 0
VOMITING: 0
RHINORRHEA: 0
BLOOD IN STOOL: 0
ABDOMINAL PAIN: 0
WHEEZING: 0
CHEST TIGHTNESS: 0
CONSTIPATION: 0
COLOR CHANGE: 0
SHORTNESS OF BREATH: 0
DIARRHEA: 0
SINUS PRESSURE: 0

## 2018-09-04 NOTE — PROGRESS NOTES
BY MOUTH NIGHTLY Yes Christiano Diop MD   levothyroxine (SYNTHROID) 25 MCG tablet TAKE ONE TABLET BY MOUTH DAILY ALONG WITH 200 MCG TABLET (225 MCG TOTAL) Yes AQUILES Maher CNP   FLUoxetine (PROZAC) 20 MG capsule TAKE 3 CAPSULES BY MOUTH DAILY Yes Stoney Guillermo MD   amLODIPine (NORVASC) 10 MG tablet TAKE ONE TABLET BY MOUTH ONE TIME DAILY Yes Christiano Diop MD   buPROPion (WELLBUTRIN XL) 300 MG extended release tablet TAKE 1 TABLET BY MOUTH EVERY MORNING Yes Christiano Diop MD   methocarbamol (ROBAXIN) 500 MG tablet TAKE 1 TABLET BY MOUTH 4 TIMES DAILY AS NEEDED (PAIN) Yes Christiano Diop MD   omeprazole (PRILOSEC) 40 MG delayed release capsule TAKE ONE CAPSULE BY MOUTH ONE TIME DAILY Yes Christiano Diop MD   ibuprofen (ADVIL;MOTRIN) 600 MG tablet Take 1 tablet by mouth 3 times daily as needed for Pain Yes Christiano Diop MD   levothyroxine (SYNTHROID) 200 MCG tablet TAKE 1 TABLET BY MOUTH EVERY DAY Yes Christiano Diop MD   famotidine (PEPCID) 20 MG tablet Take 1 tablet by mouth 2 times daily Yes Christiano Diop MD   fluticasone (FLONASE) 50 MCG/ACT nasal spray 2 sprays by Nasal route daily Yes Christiano Diop MD   gabapentin (NEURONTIN) 100 MG capsule Take 1 capsule by mouth nightly Yes Christiano Diop MD   promethazine (PHENERGAN) 25 MG tablet Take 1 tablet by mouth every 6 hours as needed for Nausea Yes Christiano Diop MD   vitamin D (CHOLECALCIFEROL) 5000 units CAPS capsule Take 5,000 Units by mouth daily Yes Historical Provider, MD   vitamin B-12 (CYANOCOBALAMIN) 1000 MCG tablet Take 1,000 mcg by mouth daily Yes Historical Provider, MD   Coenzyme Q10 (COQ-10 PO) Take by mouth daily Yes Historical Provider, MD   aspirin 81 MG EC tablet Take 81 mg by mouth daily Yes Historical Provider, MD   Calcium-Magnesium-Vitamin D 185- MG-MG-UNIT CAPS Take 1 capsule by mouth daily Yes Historical Provider, MD   acetaminophen (TYLENOL) 325 MG tablet Take 650 mg by mouth every 6 hours as needed for Pain Yes Historical Provider, MD   ibuprofen (ADVIL;MOTRIN) 200 MG tablet Take 400 mg by mouth every 6 hours as needed for Pain  Yes Historical Provider, MD   therapeutic multivitamin-minerals (THERAGRAN-M) tablet Take 1 tablet by mouth daily. Yes Historical Provider, MD   Omega-3 Fatty Acids (FISH OIL) 1000 MG CAPS Take 1,000 mg by mouth daily. Yes Historical Provider, MD        Social History   Substance Use Topics    Smoking status: Former Smoker     Quit date: 12/1/2009    Smokeless tobacco: Never Used      Comment: quit 2008    Alcohol use No        Vitals:    09/04/18 1430   BP: 136/88   Site: Left Arm   Position: Sitting   Cuff Size: Medium Adult   Pulse: 72   SpO2: 95%   Weight: (!) 325 lb (147.4 kg)   Height: 5' 8\" (1.727 m)     Estimated body mass index is 49.42 kg/m² as calculated from the following:    Height as of this encounter: 5' 8\" (1.727 m). Weight as of this encounter: 325 lb (147.4 kg). Physical Exam   Constitutional: She is oriented to person, place, and time. She appears well-developed and well-nourished. HENT:   Right Ear: Hearing, tympanic membrane, external ear and ear canal normal.   Left Ear: Hearing, tympanic membrane, external ear and ear canal normal.   Nose: No mucosal edema or rhinorrhea. Right sinus exhibits no maxillary sinus tenderness and no frontal sinus tenderness. Left sinus exhibits no maxillary sinus tenderness and no frontal sinus tenderness. Mouth/Throat: No oropharyngeal exudate, posterior oropharyngeal edema, posterior oropharyngeal erythema or tonsillar abscesses. Cardiovascular: Normal rate, regular rhythm and normal heart sounds. Pulmonary/Chest: Effort normal and breath sounds normal.   Lymphadenopathy:        Head (right side): No submental, no submandibular, no tonsillar, no preauricular, no posterior auricular and no occipital adenopathy present.         Head (left side): No submental, no submandibular, no tonsillar, no

## 2018-09-10 NOTE — TELEPHONE ENCOUNTER
Medication:   Requested Prescriptions     Pending Prescriptions Disp Refills    levothyroxine (SYNTHROID) 200 MCG tablet [Pharmacy Med Name: LEVOTHYROXINE 200 MCG TABLET] 90 tablet 1     Sig: TAKE 1 TABLET BY MOUTH EVERY DAY       Last Filled:      Patient Phone Number: [phone] (home) [phone] (work)    Last appt: 5/22/2018   Next appt: Visit date not found    Last Thyroid:   Lab Results   Component Value Date    TSH 2.13 02/22/2018    T4FREE 1.5 09/21/2016       Last OARRS:   RX Monitoring 5/22/2018   Attestation The Prescription Monitoring Report for this patient was reviewed today. Documentation No signs of potential drug abuse or diversion identified. Medication Contracts Medication contract signed today. Preferred Pharmacy:   [facility] [facility]  [address]   [place] [address]  Phone: [phone] Fax: [phone]    [place], [place] - [address] [phone] Yanique Barrera [phone]  [address] [place] [address]  Phone: [phone] Fax: [phone]

## 2018-09-11 ENCOUNTER — TELEPHONE (OUTPATIENT)
Dept: INTERNAL MEDICINE CLINIC | Age: 63
End: 2018-09-11

## 2018-09-11 RX ORDER — LEVOTHYROXINE SODIUM 0.2 MG/1
TABLET ORAL
Qty: 30 TABLET | Refills: 0 | Status: SHIPPED | OUTPATIENT
Start: 2018-09-11 | End: 2018-10-10 | Stop reason: SDUPTHER

## 2018-09-11 NOTE — TELEPHONE ENCOUNTER
Per Dr. Karan Staples, pt is being discharged from her practice. Letter sent via certified mail and regular mail. Chart updated. She will refill meds for 30 days as appropriate.

## 2018-09-24 ENCOUNTER — PATIENT MESSAGE (OUTPATIENT)
Dept: INTERNAL MEDICINE CLINIC | Age: 63
End: 2018-09-24

## 2018-09-27 ENCOUNTER — TELEPHONE (OUTPATIENT)
Dept: FAMILY MEDICINE CLINIC | Age: 63
End: 2018-09-27

## 2018-10-08 ENCOUNTER — TELEPHONE (OUTPATIENT)
Dept: FAMILY MEDICINE CLINIC | Age: 63
End: 2018-10-08

## 2018-10-08 RX ORDER — TIZANIDINE 2 MG/1
2 TABLET ORAL NIGHTLY
Qty: 90 TABLET | Refills: 3 | OUTPATIENT
Start: 2018-10-08

## 2018-10-08 RX ORDER — LEVOTHYROXINE SODIUM 0.2 MG/1
TABLET ORAL
Qty: 90 TABLET | Refills: 3 | OUTPATIENT
Start: 2018-10-08

## 2018-10-08 NOTE — TELEPHONE ENCOUNTER
Last filled: Tizanidine 8/17 & levothyroxine - 9/11  Last Office Visit (non-acute): 09/14/18  Next Office Visit: not scheduled

## 2018-10-08 NOTE — TELEPHONE ENCOUNTER
Pt called inquiring about her medical records being forwarded to her new PCP. I called [facility] and they said they received the request on 9/28 and it was processed and sent on 10/2/18. If the new Provider has not received Pt needs to call [phone] to request them to be resent since it was already processed.

## 2018-10-10 DIAGNOSIS — E03.9 ACQUIRED HYPOTHYROIDISM: Primary | ICD-10-CM

## 2018-10-10 RX ORDER — TIZANIDINE 2 MG/1
2 TABLET ORAL NIGHTLY
Qty: 30 TABLET | Refills: 0 | Status: SHIPPED | OUTPATIENT
Start: 2018-10-10 | End: 2020-12-31

## 2018-10-10 RX ORDER — LEVOTHYROXINE SODIUM 0.2 MG/1
TABLET ORAL
Qty: 30 TABLET | Refills: 0 | Status: SHIPPED | OUTPATIENT
Start: 2018-10-10 | End: 2020-12-31 | Stop reason: ALTCHOICE

## 2018-10-10 NOTE — TELEPHONE ENCOUNTER
Disregard this message to call pt's [facility] Pharmacist at [phone] to inform them patient is no longer under my care and to send any refill requests to pt's current PCP because pt hasn't seen her new PCP yet.

## 2020-11-03 PROBLEM — R55 SYNCOPE AND COLLAPSE: Status: RESOLVED | Noted: 2020-11-03 | Resolved: 2020-11-03

## 2020-12-31 ENCOUNTER — HOSPITAL ENCOUNTER (EMERGENCY)
Age: 65
Discharge: HOME | End: 2020-12-31
Attending: EMERGENCY MEDICINE
Payer: MEDICARE

## 2020-12-31 ENCOUNTER — APPOINTMENT (OUTPATIENT)
Dept: GENERAL RADIOLOGY | Age: 65
End: 2020-12-31
Payer: MEDICARE

## 2020-12-31 VITALS
RESPIRATION RATE: 15 BRPM | OXYGEN SATURATION: 97 % | TEMPERATURE: 98.7 F | HEART RATE: 85 BPM | SYSTOLIC BLOOD PRESSURE: 131 MMHG | DIASTOLIC BLOOD PRESSURE: 74 MMHG

## 2020-12-31 DIAGNOSIS — G89.29 OTHER CHRONIC PAIN: ICD-10-CM

## 2020-12-31 DIAGNOSIS — M79.7 FIBROMYALGIA: ICD-10-CM

## 2020-12-31 DIAGNOSIS — M51.37 DDD (DEGENERATIVE DISC DISEASE), LUMBOSACRAL: ICD-10-CM

## 2020-12-31 DIAGNOSIS — I48.0 INTERMITTENT ATRIAL FIBRILLATION (HCC): Primary | ICD-10-CM

## 2020-12-31 LAB
ANION GAP SERPL CALCULATED.3IONS-SCNC: 11 MMOL/L (ref 3–16)
APTT: 31.4 SEC (ref 24.2–36.2)
BASOPHILS ABSOLUTE: 0.1 K/UL (ref 0–0.2)
BASOPHILS RELATIVE PERCENT: 0.7 %
BILIRUBIN URINE: NEGATIVE
BLOOD, URINE: NEGATIVE
BUN BLDV-MCNC: 23 MG/DL (ref 7–20)
CALCIUM SERPL-MCNC: 9.3 MG/DL (ref 8.3–10.6)
CHLORIDE BLD-SCNC: 102 MMOL/L (ref 99–110)
CLARITY: CLEAR
CO2: 27 MMOL/L (ref 21–32)
COLOR: YELLOW
CREAT SERPL-MCNC: 1.1 MG/DL (ref 0.6–1.2)
EKG ATRIAL RATE: 234 BPM
EKG DIAGNOSIS: NORMAL
EKG Q-T INTERVAL: 378 MS
EKG QRS DURATION: 84 MS
EKG QTC CALCULATION (BAZETT): 469 MS
EKG R AXIS: -19 DEGREES
EKG T AXIS: -14 DEGREES
EKG VENTRICULAR RATE: 93 BPM
EOSINOPHILS ABSOLUTE: 0.2 K/UL (ref 0–0.6)
EOSINOPHILS RELATIVE PERCENT: 2.6 %
GFR AFRICAN AMERICAN: 60
GFR NON-AFRICAN AMERICAN: 49
GLUCOSE BLD-MCNC: 120 MG/DL (ref 70–99)
GLUCOSE URINE: NEGATIVE MG/DL
HCT VFR BLD CALC: 41 % (ref 36–48)
HEMOGLOBIN: 13 G/DL (ref 12–16)
INR BLD: 0.91 (ref 0.86–1.14)
KETONES, URINE: NEGATIVE MG/DL
LEUKOCYTE ESTERASE, URINE: NEGATIVE
LYMPHOCYTES ABSOLUTE: 2.7 K/UL (ref 1–5.1)
LYMPHOCYTES RELATIVE PERCENT: 28.6 %
MCH RBC QN AUTO: 25.1 PG (ref 26–34)
MCHC RBC AUTO-ENTMCNC: 31.7 G/DL (ref 31–36)
MCV RBC AUTO: 79.4 FL (ref 80–100)
MICROSCOPIC EXAMINATION: NORMAL
MONOCYTES ABSOLUTE: 0.5 K/UL (ref 0–1.3)
MONOCYTES RELATIVE PERCENT: 4.9 %
NEUTROPHILS ABSOLUTE: 5.9 K/UL (ref 1.7–7.7)
NEUTROPHILS RELATIVE PERCENT: 63.2 %
NITRITE, URINE: NEGATIVE
PDW BLD-RTO: 17.6 % (ref 12.4–15.4)
PH UA: 6 (ref 5–8)
PLATELET # BLD: 312 K/UL (ref 135–450)
PMV BLD AUTO: 8.2 FL (ref 5–10.5)
POTASSIUM REFLEX MAGNESIUM: 4.5 MMOL/L (ref 3.5–5.1)
PRO-BNP: 586 PG/ML (ref 0–124)
PROTEIN UA: NEGATIVE MG/DL
PROTHROMBIN TIME: 10.6 SEC (ref 10–13.2)
RBC # BLD: 5.17 M/UL (ref 4–5.2)
SODIUM BLD-SCNC: 140 MMOL/L (ref 136–145)
SPECIFIC GRAVITY UA: >=1.03 (ref 1–1.03)
TROPONIN: <0.01 NG/ML
TROPONIN: <0.01 NG/ML
URINE TYPE: NORMAL
UROBILINOGEN, URINE: 0.2 E.U./DL
WBC # BLD: 9.4 K/UL (ref 4–11)

## 2020-12-31 PROCEDURE — 80048 BASIC METABOLIC PNL TOTAL CA: CPT

## 2020-12-31 PROCEDURE — 73502 X-RAY EXAM HIP UNI 2-3 VIEWS: CPT

## 2020-12-31 PROCEDURE — 85025 COMPLETE CBC W/AUTO DIFF WBC: CPT

## 2020-12-31 PROCEDURE — 71046 X-RAY EXAM CHEST 2 VIEWS: CPT

## 2020-12-31 PROCEDURE — 99283 EMERGENCY DEPT VISIT LOW MDM: CPT

## 2020-12-31 PROCEDURE — 85610 PROTHROMBIN TIME: CPT

## 2020-12-31 PROCEDURE — 84443 ASSAY THYROID STIM HORMONE: CPT

## 2020-12-31 PROCEDURE — 84439 ASSAY OF FREE THYROXINE: CPT

## 2020-12-31 PROCEDURE — 81003 URINALYSIS AUTO W/O SCOPE: CPT

## 2020-12-31 PROCEDURE — 93005 ELECTROCARDIOGRAM TRACING: CPT | Performed by: STUDENT IN AN ORGANIZED HEALTH CARE EDUCATION/TRAINING PROGRAM

## 2020-12-31 PROCEDURE — 6370000000 HC RX 637 (ALT 250 FOR IP): Performed by: PHYSICIAN ASSISTANT

## 2020-12-31 PROCEDURE — 84484 ASSAY OF TROPONIN QUANT: CPT

## 2020-12-31 PROCEDURE — 73562 X-RAY EXAM OF KNEE 3: CPT

## 2020-12-31 PROCEDURE — 83880 ASSAY OF NATRIURETIC PEPTIDE: CPT

## 2020-12-31 PROCEDURE — 85730 THROMBOPLASTIN TIME PARTIAL: CPT

## 2020-12-31 RX ORDER — LORAZEPAM 0.5 MG/1
0.5 TABLET ORAL EVERY 6 HOURS PRN
COMMUNITY

## 2020-12-31 RX ORDER — HYDROCODONE BITARTRATE AND ACETAMINOPHEN 5; 325 MG/1; MG/1
1 TABLET ORAL EVERY 6 HOURS PRN
COMMUNITY

## 2020-12-31 RX ORDER — ASPIRIN 325 MG
325 TABLET, DELAYED RELEASE (ENTERIC COATED) ORAL DAILY
Qty: 30 TABLET | Refills: 0 | Status: SHIPPED | OUTPATIENT
Start: 2020-12-31 | End: 2021-01-07 | Stop reason: ALTCHOICE

## 2020-12-31 RX ORDER — BUSPIRONE HYDROCHLORIDE 10 MG/1
10 TABLET ORAL 2 TIMES DAILY
Status: ON HOLD | COMMUNITY
End: 2022-06-05 | Stop reason: HOSPADM

## 2020-12-31 RX ORDER — ASPIRIN 325 MG
325 TABLET ORAL ONCE
Status: COMPLETED | OUTPATIENT
Start: 2020-12-31 | End: 2020-12-31

## 2020-12-31 RX ORDER — SODIUM CHLORIDE, SODIUM LACTATE, POTASSIUM CHLORIDE, CALCIUM CHLORIDE 600; 310; 30; 20 MG/100ML; MG/100ML; MG/100ML; MG/100ML
1000 INJECTION, SOLUTION INTRAVENOUS CONTINUOUS
Status: ACTIVE | OUTPATIENT
Start: 2020-12-31 | End: 2020-12-31

## 2020-12-31 RX ADMIN — ASPIRIN 325 MG: 325 TABLET ORAL at 20:18

## 2020-12-31 ASSESSMENT — PAIN DESCRIPTION - ORIENTATION: ORIENTATION: RIGHT

## 2020-12-31 ASSESSMENT — PAIN DESCRIPTION - LOCATION: LOCATION: LEG

## 2020-12-31 NOTE — ED PROVIDER NOTES
[address] [facility] RESIDENT NOTE       Date of evaluation: 12/31/2020    Chief Complaint     Shortness of Breath and Atrial Fibrillation      History of Present Illness     Tyler Grimes is a 77 y.o. female with past medical history significant for hyperlipidemia, hyperglycemia, aortic stenosis, hypothyroidism, hypertension, fibromyalgia, obesity was presented to the emergency department for evaluation of atrial fibrillation. Patient reports that her primary care physician has been managing her thyroid replacement and has made multiple recent adjustments. She had an EKG and some basic laboratory work performed at [facility] today, and her primary care physician received the results and told her to go to the ER for evaluation out of concern for atrial fibrillation. Patient denies any current chest pain, feels at baseline short of breath, no nausea, vomiting, dysuria, hematuria, fevers, chills. She had a few second episode of palpitations this morning that spontaneously resolved. No orthopnea, paroxysmal nocturnal dyspnea. Of note, she fell while walking her dog within the last few days and fell onto her right side. She has been able to ambulate since, but has had persistent pain in the right hip and knee. Review of Systems     Review of Systems   Constitutional: Negative for chills, fatigue and fever. Respiratory: Negative for chest tightness and shortness of breath. Cardiovascular: Positive for palpitations. Negative for chest pain and leg swelling. Gastrointestinal: Negative for abdominal pain, diarrhea, nausea and vomiting. Genitourinary: Negative for dysuria, hematuria and urgency. Musculoskeletal: Positive for arthralgias. Neurological: Negative for dizziness, syncope and weakness.        Past Medical, Surgical, Family, and Social History She has a past medical history of Aortic stenosis, Arthritis, Coronary artery disease, Depression, Fatigue, Fibromyalgia, Hyperlipidemia, Hypertension, Hypothyroidism, Pancreatitis, and Type II or unspecified type diabetes mellitus without mention of complication, not stated as uncontrolled. She has a past surgical history that includes  section; Hysterectomy; Ankle fracture surgery (); lymph node biopsy (); Colonoscopy; Endoscopy, colon, diagnostic; and Cholecystectomy, laparoscopic (2014). Her family history includes Diabetes in her mother; Heart Disease in her brother; Hypertension in her mother; Stroke in her father. She reports that she quit smoking about 11 years ago. She has never used smokeless tobacco. She reports that she does not drink alcohol or use drugs. Medications     Previous Medications    ACETAMINOPHEN (TYLENOL) 325 MG TABLET    Take 650 mg by mouth every 6 hours as needed for Pain    AMLODIPINE (NORVASC) 10 MG TABLET    TAKE ONE TABLET BY MOUTH ONE TIME DAILY    BUPROPION (WELLBUTRIN XL) 300 MG EXTENDED RELEASE TABLET    TAKE 1 TABLET BY MOUTH EVERY MORNING    BUSPIRONE (BUSPAR) 10 MG TABLET    Take 10 mg by mouth 2 times daily Unsure of mg amount    CALCIUM-MAGNESIUM-VITAMIN D 185- MG-MG-UNIT CAPS    Take 1 capsule by mouth daily    COENZYME Q10 (COQ-10 PO)    Take by mouth daily    ESCITALOPRAM OXALATE (LEXAPRO PO)    Take by mouth nightly Unsure of mg amount    FAMOTIDINE (PEPCID) 20 MG TABLET    Take 1 tablet by mouth 2 times daily    FLUTICASONE (FLONASE) 50 MCG/ACT NASAL SPRAY    2 sprays by Nasal route daily    HYDROCODONE-ACETAMINOPHEN (NORCO) 5-325 MG PER TABLET    Take 1 tablet by mouth every 6 hours as needed for Pain.     IBUPROFEN (ADVIL;MOTRIN) 200 MG TABLET    Take 400 mg by mouth every 6 hours as needed for Pain     IBUPROFEN (ADVIL;MOTRIN) 600 MG TABLET    Take 1 tablet by mouth 3 times daily as needed for Pain LEVOTHYROXINE (SYNTHROID) 25 MCG TABLET    TAKE ONE TABLET BY MOUTH DAILY ALONG WITH 200 MCG TABLET (225 MCG TOTAL)    LORAZEPAM (ATIVAN) 0.5 MG TABLET    Take 0.5 mg by mouth every 6 hours as needed for Anxiety. METHOCARBAMOL (ROBAXIN) 500 MG TABLET    TAKE 1 TABLET BY MOUTH 4 TIMES DAILY AS NEEDED (PAIN)    OMEGA-3 FATTY ACIDS (FISH OIL) 1000 MG CAPS    Take 1,000 mg by mouth daily. OMEPRAZOLE (PRILOSEC) 40 MG DELAYED RELEASE CAPSULE    TAKE ONE CAPSULE BY MOUTH ONE TIME DAILY    PREGABALIN (LYRICA) 50 MG CAPSULE    Take 1 capsule by mouth 2 times daily for 31 days. Julia Libman PROMETHAZINE (PHENERGAN) 25 MG TABLET    Take 1 tablet by mouth every 6 hours as needed for Nausea    THERAPEUTIC MULTIVITAMIN-MINERALS (THERAGRAN-M) TABLET    Take 1 tablet by mouth daily. VITAMIN D (CHOLECALCIFEROL) 5000 UNITS CAPS CAPSULE    Take 5,000 Units by mouth daily       Allergies     She is allergic to cephalosporins; dilaudid [hydromorphone hcl]; fortaz [ceftazidime]; and statins. Physical Exam     INITIAL VITALS: BP: (!) 181/91,  , Pulse: 83, Resp: 20, SpO2: 97 %   Physical Exam  Constitutional:       General: She is not in acute distress. Appearance: She is obese. She is not ill-appearing. HENT:      Head: Normocephalic and atraumatic. Mouth/Throat:      Mouth: Mucous membranes are moist.   Eyes:      Pupils: Pupils are equal, round, and reactive to light. Comments: EOM grossly intact   Neck:      Musculoskeletal: Neck supple. Thyroid: No thyromegaly. Vascular: No JVD. Cardiovascular:      Rate and Rhythm: Normal rate. Rhythm irregular. Heart sounds: Murmur present. No friction rub. No gallop. Pulmonary:      Effort: Pulmonary effort is normal.      Breath sounds: Normal breath sounds. Abdominal:      Palpations: Abdomen is soft. Musculoskeletal:      Right lower leg: No edema. Left lower leg: No edema. Comments: Tenderness to palpation over the right hip, lateral joint line of right knee. No obvious bruising present. No laxity of the patella. Logroll negative on the right. 2+ dorsalis pedis pulses bilaterally, sensation intact to light touch on plantar surface of the foot, anterior and posterior tibia; slightly decreased sensation to light touch over the dorsum of the right foot, isolated   Skin:     General: Skin is warm and dry. Neurological:      Mental Status: She is alert. Comments: Alert, nondysarthric         DiagnosticResults     EKG   Interpreted in conjunction with emergencydepartment physician No att. providers found  Rhythm: atrial fibrillation - controlled  Rate: normal  Axis: left  Ectopy: none  Conduction: normal  ST Segments: no acute change  T Waves:non specific changes  Q Waves: v1 and III  Clinical Impression: atrial fibrillation (new onset)  Comparison:  New from prior    RADIOLOGY:  XR KNEE RIGHT (3 VIEWS)   Final Result      No evidence of acute osseous abnormality. Moderate tricompartmental degenerative changes. XR HIP 2-3 VW W PELVIS RIGHT   Final Result      No evidence of acute osseous abnormality. XR CHEST (2 VW)   Final Result      No acute cardiopulmonary findings.           LABS:   Results for orders placed or performed during the hospital encounter of 12/31/20   CBC Auto Differential   Result Value Ref Range    WBC 9.4 4.0 - 11.0 K/uL    RBC 5.17 4.00 - 5.20 M/uL    Hemoglobin 13.0 12.0 - 16.0 g/dL    Hematocrit 41.0 36.0 - 48.0 %    MCV 79.4 (L) 80.0 - 100.0 fL    MCH 25.1 (L) 26.0 - 34.0 pg    MCHC 31.7 31.0 - 36.0 g/dL    RDW 17.6 (H) 12.4 - 15.4 %    Platelets 690 674 - 695 K/uL    MPV 8.2 5.0 - 10.5 fL    Neutrophils % 63.2 %    Lymphocytes % 28.6 %    Monocytes % 4.9 %    Eosinophils % 2.6 %    Basophils % 0.7 %    Neutrophils Absolute 5.9 1.7 - 7.7 K/uL    Lymphocytes Absolute 2.7 1.0 - 5.1 K/uL    Monocytes Absolute 0.5 0.0 - 1.3 K/uL Eosinophils Absolute 0.2 0.0 - 0.6 K/uL    Basophils Absolute 0.1 0.0 - 0.2 K/uL   Basic Metabolic Panel w/ Reflex to MG   Result Value Ref Range    Sodium 140 136 - 145 mmol/L    Potassium reflex Magnesium 4.5 3.5 - 5.1 mmol/L    Chloride 102 99 - 110 mmol/L    CO2 27 21 - 32 mmol/L    Anion Gap 11 3 - 16    Glucose 120 (H) 70 - 99 mg/dL    BUN 23 (H) 7 - 20 mg/dL    CREATININE 1.1 0.6 - 1.2 mg/dL    GFR Non- 49 (A) >60    GFR  60 (A) >60    Calcium 9.3 8.3 - 10.6 mg/dL   Troponin   Result Value Ref Range    Troponin <0.01 <0.01 ng/mL   Brain Natriuretic Peptide   Result Value Ref Range    Pro- (H) 0 - 124 pg/mL   Protime-INR   Result Value Ref Range    Protime 10.6 10.0 - 13.2 sec    INR 0.91 0.86 - 1.14   APTT   Result Value Ref Range    aPTT 31.4 24.2 - 36.2 sec   Urinalysis, reflex to microscopic   Result Value Ref Range    Color, UA Yellow Straw/Yellow    Clarity, UA Clear Clear    Glucose, Ur Negative Negative mg/dL    Bilirubin Urine Negative Negative    Ketones, Urine Negative Negative mg/dL    Specific Gravity, UA >=1.030 1.005 - 1.030    Blood, Urine Negative Negative    pH, UA 6.0 5.0 - 8.0    Protein, UA Negative Negative mg/dL    Urobilinogen, Urine 0.2 <2.0 E.U./dL    Nitrite, Urine Negative Negative    Leukocyte Esterase, Urine Negative Negative    Microscopic Examination Not Indicated     Urine Type Voided    Troponin   Result Value Ref Range    Troponin <0.01 <0.01 ng/mL   EKG 12 Lead   Result Value Ref Range    Ventricular Rate 93 BPM    Atrial Rate 234 BPM    QRS Duration 84 ms    Q-T Interval 378 ms    QTc Calculation (Bazett) 469 ms    R Axis -19 degrees    T Axis -14 degrees    Diagnosis       EKG performed in ER and to be interpreted by ER physician. Confirmed by MD, ER ([id number]),  Cliff Espitia ([phone]) on 12/31/2020 5:56:06 PM       ED BEDSIDE ULTRASOUND:       RECENT VITALS:  BP: (!) 146/80,  , Pulse: 81,Resp: 12, SpO2: 93 %     Procedures ED Course     Nursing Notes, Past Medical Hx, Past Surgical Hx, Social Hx, Allergies, and Family Hx were reviewed. The patient was given the followingmedications:  Orders Placed This Encounter   Medications    lactated ringers infusion 1,000 mL    aspirin tablet 325 mg    aspirin 325 MG EC tablet     Sig: Take 1 tablet by mouth daily     Dispense:  30 tablet     Refill:  0    Misc. Devices (WALKER) MISC     Sig: Use with walking or standing at all times to prevent falls.      Dispense:  1 each     Refill:  0       CONSULTS:  IP CONSULT TO [address] / Josué Situ / Jaci [place] Davina Perla is a 77 y.o. female with past medical history as above who is presenting for new onset atrial fibrillation. Given the fact that overall the patient has been asymptomatic, unable to determine how long this has been ongoing; thus, not a candidate for cardioversion at this point time. Etiology may be secondary to modification of recent thyroid replacement, versus ischemic although troponin x2 and ECG reassuring for ACS, versus electrolyte abnormalities, although metabolic panel was within normal limits. In the absence of infectious symptoms such as fever, chills, dysuria or other abnormalities, lower suspicion for sepsis or underlying infection clinically apparent at this point time as etiology of atrial fibrillation. Glucose within normal limits. Patient remained stable, with an A. fib with no rapid ventricular response while in the emergency department, had no chest pain, no dyspnea, and chest x-ray results was without sign of pulmonary edema and BNP normal for age correlated limits decreasing suspicion for acute onset heart failure associated with atrial fibrillation. [id number] score was 3, has bled 3. Had a risk-benefit discussion with the patient regarding initiation of anticoagulation therapy, and she was hoping to take some time to think about this. She is family history of stroke and is concerned that she may end up like her father, who is a long-term inpatient at [facility] and immobilized secondary to stroke. X-rays negative for acute fracture of the knee, hip. [facility] This patient was also evaluated by the attending physician. All care plans werediscussed and agreed upon. Clinical Impression     1. Intermittent atrial fibrillation (HCC)    2. Other chronic pain    3. DDD (degenerative disc disease), lumbosacral    4.  Fibromyalgia        Disposition     PATIENT REFERRED TO:  Tina Waldron MD Analisa Garib[address]    Schedule an appointment as soon as possible for a visit       [facility] Emergency Department  [address] [place] [address]  Go to   If symptoms worsen      DISCHARGE MEDICATIONS:  New Prescriptions    ASPIRIN 325 MG EC TABLET    Take 1 tablet by mouth daily    MISC. DEVICES (WALKER) MISC    Use with walking or standing at all times to prevent falls.        DISPOSITION Decision To Discharge 12/31/2020 08:17:58 PM       Ada Trinidad MD  Resident  12/31/20 2033       Ada Trinidad MD  Resident  12/31/20 2033       Ada Trinidad MD  Resident  12/31/20 2033

## 2020-12-31 NOTE — ED PROVIDER NOTES
ED Attending Attestation Note     Date of evaluation: 12/31/2020    This patient was seen by the resident. I have seen and examined the patient, agree with the workup, evaluation, management and diagnosis. The care plan has been discussed. I have reviewed the ECG and concur with the resident's interpretation. My assessment reveals an overall well-appearing patient, pleasantly conversational, in no acute distress. She was referred to the emergency department when she was incidentally found as an outpatient to be in atrial fibrillation, which is a new diagnosis. She has felt an episode of palpitations this morning, and in retrospect does feel that she has perhaps noticed some increasing dyspnea on exertion when she walks her dog recently, but otherwise cannot pinpoint the onset of symptoms. She is experiencing no chest pain. She does have an irregularly irregular rhythm, but is rate controlled in the 70s to 90s. She notes that there have been significant recent changes in her thyroid medication regimen, which could be an inciting factor for her new diagnosis of atrial fibrillation. Basic medical and Cardiologic work-up is initiated in the emergency department. At this time there is not a clear indication for admission as yet, but we will need to discuss with her primary care provider whether or not to initiate the patient on anticoagulation, as the patient does note that she has a history of frequent falls. Additionally, the patient complains of pain mostly in the right knee, which has been present for some weeks at least, more severe recently since she had a trip and fall while walking her dog. On my examination, she does not have obvious deformity or effusion, but has some tenderness to palpation that is most prominent over the lateral aspect of the right lower leg, just below the level of the knee.          Nikki Jhaveri MD  12/31/20 5430

## 2021-01-02 LAB
T4 FREE: 0.3 NG/DL (ref 0.9–1.8)
TSH SERPL DL<=0.05 MIU/L-ACNC: 112 UIU/ML (ref 0.27–4.2)

## 2021-01-03 NOTE — ED PROVIDER NOTES
[address] [facility] [address] ENCOUNTER          PHYSICIAN ASSISTANT NOTE     Date of evaluation: 12/31/2020    ADDENDUM:      Care of this patient was assumed from my colleague, Dr. Geronimo Robertson. The patient was seen for Shortness of Breath and Atrial Fibrillation  . The patient's initial evaluation and plan have been discussed with the prior provider who initially evaluated the patient. Nursing Notes, Past Medical Hx, Past Surgical Hx, Social Hx, Allergies, and Family Hx were all reviewed. Diagnostic Results     EKG   Please see the prior provider's note for any ECG interpretation. RADIOLOGY:  XR KNEE RIGHT (3 VIEWS)   Final Result      No evidence of acute osseous abnormality. Moderate tricompartmental degenerative changes. XR HIP 2-3 VW W PELVIS RIGHT   Final Result      No evidence of acute osseous abnormality. XR CHEST (2 VW)   Final Result      No acute cardiopulmonary findings.           LABS:   Results for orders placed or performed during the hospital encounter of 12/31/20   CBC Auto Differential   Result Value Ref Range    WBC 9.4 4.0 - 11.0 K/uL    RBC 5.17 4.00 - 5.20 M/uL    Hemoglobin 13.0 12.0 - 16.0 g/dL    Hematocrit 41.0 36.0 - 48.0 %    MCV 79.4 (L) 80.0 - 100.0 fL    MCH 25.1 (L) 26.0 - 34.0 pg    MCHC 31.7 31.0 - 36.0 g/dL    RDW 17.6 (H) 12.4 - 15.4 %    Platelets 555 209 - 632 K/uL    MPV 8.2 5.0 - 10.5 fL    Neutrophils % 63.2 %    Lymphocytes % 28.6 %    Monocytes % 4.9 %    Eosinophils % 2.6 %    Basophils % 0.7 %    Neutrophils Absolute 5.9 1.7 - 7.7 K/uL    Lymphocytes Absolute 2.7 1.0 - 5.1 K/uL    Monocytes Absolute 0.5 0.0 - 1.3 K/uL    Eosinophils Absolute 0.2 0.0 - 0.6 K/uL    Basophils Absolute 0.1 0.0 - 0.2 K/uL   Basic Metabolic Panel w/ Reflex to MG   Result Value Ref Range    Sodium 140 136 - 145 mmol/L    Potassium reflex Magnesium 4.5 3.5 - 5.1 mmol/L    Chloride 102 99 - 110 mmol/L    CO2 27 21 - 32 mmol/L    Anion Gap 11 3 - 16 Glucose 120 (H) 70 - 99 mg/dL    BUN 23 (H) 7 - 20 mg/dL    CREATININE 1.1 0.6 - 1.2 mg/dL    GFR Non- 49 (A) >60    GFR  60 (A) >60    Calcium 9.3 8.3 - 10.6 mg/dL   Troponin   Result Value Ref Range    Troponin <0.01 <0.01 ng/mL   Brain Natriuretic Peptide   Result Value Ref Range    Pro- (H) 0 - 124 pg/mL   Protime-INR   Result Value Ref Range    Protime 10.6 10.0 - 13.2 sec    INR 0.91 0.86 - 1.14   APTT   Result Value Ref Range    aPTT 31.4 24.2 - 36.2 sec   Urinalysis, reflex to microscopic   Result Value Ref Range    Color, UA Yellow Straw/Yellow    Clarity, UA Clear Clear    Glucose, Ur Negative Negative mg/dL    Bilirubin Urine Negative Negative    Ketones, Urine Negative Negative mg/dL    Specific Gravity, UA >=1.030 1.005 - 1.030    Blood, Urine Negative Negative    pH, UA 6.0 5.0 - 8.0    Protein, UA Negative Negative mg/dL    Urobilinogen, Urine 0.2 <2.0 E.U./dL    Nitrite, Urine Negative Negative    Leukocyte Esterase, Urine Negative Negative    Microscopic Examination Not Indicated     Urine Type Voided    TSH without Reflex   Result Value Ref Range    .00 (H) 0.27 - 4.20 uIU/mL   T4, free   Result Value Ref Range    T4 Free 0.3 (L) 0.9 - 1.8 ng/dL   Troponin   Result Value Ref Range    Troponin <0.01 <0.01 ng/mL   EKG 12 Lead   Result Value Ref Range    Ventricular Rate 93 BPM    Atrial Rate 234 BPM    QRS Duration 84 ms    Q-T Interval 378 ms    QTc Calculation (Bazett) 469 ms    R Axis -19 degrees    T Axis -14 degrees    Diagnosis       EKG performed in ER and to be interpreted by ER physician. Confirmed by MD, ER ([id number]),  Linda Martini ([phone]) on 12/31/2020 5:56:06 PM       RECENT VITALS:  BP: 131/74, Temp: 98.7 °F (37.1 °C), Pulse: 85, Resp: 15, SpO2: 97 %     Procedures     N/A    ED Course     The patient was given the following medications:  Orders Placed This Encounter   Medications    lactated ringers infusion 1,000 mL  aspirin tablet 325 mg    aspirin 325 MG EC tablet     Sig: Take 1 tablet by mouth daily     Dispense:  30 tablet     Refill:  0    Misc. Devices (WALKER) MISC     Sig: Use with walking or standing at all times to prevent falls. Dispense:  1 each     Refill:  0       CONSULTS:  IP CONSULT TO [address] / ASSESSMENT / Shannon Greenwood is admitted to the Emergency Department for evaluation of her chief complaint as described in the history of present illness. Complete history and physical was performed by me and my attending. Nursing notes, past medical history, surgical history, family history and social history were reviewed and addressed in the HPI. Ren Kothari is a 77 y.o. female who presents to the emergency department with a complaint of a complaint of palpitations and mild shortness of breath. The patient had some lab work and an ECG performed today at her living facility and after interpretation of the EKG, her primary care physician sent her to the emergency department for evaluation of possible atrial fibrillation. The patient denies any chest pain, but did do one episode of palpitations. She feels at baseline with her shortness of breath, generalized pain but has no other new symptoms. On evaluation by the prior emergency department team, the patient was found to have a negative initial troponin, she has had intermittent episodes of rate controlled atrial fibrillation without rapid ventricular response. Her CBC demonstrates no leukocytosis, her BMP demonstrates some slight dehydration with a GFR decreased at 49. An infusion of lactated Ringer's was initiated. Her pro BMP is 586, consistent with age-related elevation. At the time of turnover, the patient was pending a 2nd troponin, a two-hour delta as well as a discussion with the patient over choices for anticoagulation. The patient does demonstrate a [id number] score of 3, which would suggest the initiation of the DOAC. However, the patient has had several falls and currently only utilizes a cane for ambulation. In fact she had x-rays performed during this emergency department visit earlier for areas of pain in her leg as a result of earlier falls and has fibromyalgia as well as degenerative disc disease. The patient's two-hour delta troponin was negative. The patient continues to be symptom free, though she does appear to go in and out of atrial fibrillation. Discussed the need for anticoagulation therapy with the patient. In show decision making, we decided that the best possible option was to start the patient on a full strength aspirin currently as well as provide her with a prescription for a walker to be used for all ambulation. She will utilize those walker through the weekend and if she feels more stable with ambulation and her fall risk is decreased, she will discuss this with cardiology, with which she is going to follow-up at the beginning of next week, to potentially step up to a DOAC. However, if she continues to demonstrate significant fall risk, this will be discussed with cardiology as well which could potentially keep her on a full strength aspirin, baby aspirin for potentially no anticoagulation at all based on cardiology's findings and recommendations. I discussed this plan at length the patient who verbalizes understanding and is in agreement. The patient is currently stable and will be discharged home for continued self-care. Please see patient's AVS for additional discharge instructions. The patient was seen and evaluated by myself and the attending physician, Lawrence John M.D., who agrees with my assessment, treatment and plan. Clinical Impression     1. Intermittent atrial fibrillation (HCC)    2. Other chronic pain    3. DDD (degenerative disc disease), lumbosacral    4. Fibromyalgia        Disposition     PATIENT REFERRED TO:  Man Washignton MD  [address]  [address] [facility] Christophe Mckeon    Schedule an appointment as soon as possible for a visit       [facility] Emergency Department  [address] [facility]  Go to   If symptoms worsen      DISCHARGE MEDICATIONS: Discharge Medication List as of 12/31/2020  8:24 PM      START taking these medications    Details   aspirin 325 MG EC tablet Take 1 tablet by mouth daily, Disp-30 tablet, R-0Print      Misc. Devices (WALKER) MISC Disp-1 each, R-0, PrintUse with walking or standing at all times to prevent falls.              DISPOSITION Decision To Discharge 12/31/2020 08:17:58 PM         Danish Jorgensen [place]  01/03/21 [id number]

## 2021-01-06 ENCOUNTER — OFFICE VISIT (OUTPATIENT)
Dept: CARDIOLOGY CLINIC | Age: 66
End: 2021-01-06
Payer: MEDICARE

## 2021-01-06 ENCOUNTER — TELEPHONE (OUTPATIENT)
Dept: CARDIOLOGY CLINIC | Age: 66
End: 2021-01-06

## 2021-01-06 VITALS
SYSTOLIC BLOOD PRESSURE: 134 MMHG | BODY MASS INDEX: 49.87 KG/M2 | TEMPERATURE: 96.7 F | DIASTOLIC BLOOD PRESSURE: 68 MMHG | HEART RATE: 96 BPM | WEIGHT: 293 LBS

## 2021-01-06 DIAGNOSIS — I48.91 ATRIAL FIBRILLATION, UNSPECIFIED TYPE (HCC): Primary | ICD-10-CM

## 2021-01-06 DIAGNOSIS — I10 ESSENTIAL HYPERTENSION: ICD-10-CM

## 2021-01-06 DIAGNOSIS — E66.01 MORBID OBESITY DUE TO EXCESS CALORIES (HCC): ICD-10-CM

## 2021-01-06 DIAGNOSIS — I35.0 AORTIC VALVE STENOSIS, ETIOLOGY OF CARDIAC VALVE DISEASE UNSPECIFIED: ICD-10-CM

## 2021-01-06 DIAGNOSIS — E03.2 HYPOTHYROIDISM DUE TO MEDICATION: ICD-10-CM

## 2021-01-06 PROCEDURE — 1123F ACP DISCUSS/DSCN MKR DOCD: CPT | Performed by: INTERNAL MEDICINE

## 2021-01-06 PROCEDURE — G8400 PT W/DXA NO RESULTS DOC: HCPCS | Performed by: INTERNAL MEDICINE

## 2021-01-06 PROCEDURE — G8417 CALC BMI ABV UP PARAM F/U: HCPCS | Performed by: INTERNAL MEDICINE

## 2021-01-06 PROCEDURE — G8427 DOCREV CUR MEDS BY ELIG CLIN: HCPCS | Performed by: INTERNAL MEDICINE

## 2021-01-06 PROCEDURE — 99204 OFFICE O/P NEW MOD 45 MIN: CPT | Performed by: INTERNAL MEDICINE

## 2021-01-06 PROCEDURE — 3017F COLORECTAL CA SCREEN DOC REV: CPT | Performed by: INTERNAL MEDICINE

## 2021-01-06 PROCEDURE — 1036F TOBACCO NON-USER: CPT | Performed by: INTERNAL MEDICINE

## 2021-01-06 PROCEDURE — 4040F PNEUMOC VAC/ADMIN/RCVD: CPT | Performed by: INTERNAL MEDICINE

## 2021-01-06 PROCEDURE — 1090F PRES/ABSN URINE INCON ASSESS: CPT | Performed by: INTERNAL MEDICINE

## 2021-01-06 PROCEDURE — G8484 FLU IMMUNIZE NO ADMIN: HCPCS | Performed by: INTERNAL MEDICINE

## 2021-01-06 ASSESSMENT — ENCOUNTER SYMPTOMS
ORTHOPNEA: 0
SLEEP DISTURBANCES DUE TO BREATHING: 0
GASTROINTESTINAL NEGATIVE: 1
ALLERGIC/IMMUNOLOGIC NEGATIVE: 1
COUGH: 0
WHEEZING: 0
SPUTUM PRODUCTION: 0
HEMOPTYSIS: 0
SHORTNESS OF BREATH: 1
SNORING: 0
BACK PAIN: 1
EYES NEGATIVE: 1

## 2021-01-06 NOTE — PROGRESS NOTES
CC: AF    HPI:  Pt has afib and went to ER on 12/31/20 for SOB. She was discharged with stable vitals. Fatigued and irritable. Has fibromyalgia. FH:  Mom has MI at 80 lived to 80. D normal.  Brother had AS. SH:  No tobacco now. Quit in 2008. 1/2 ppd for 30 yrs. PMH:  AS mild and fibromyalgia. dDD neck and low back. Dep and anxiety  Depression. Obesity. Meds:  Lisinopril. ECG AF with HR 96 bpm    Vitals:    01/06/21 1320   BP: 134/68   Pulse: 96   Temp: 96.7 °F (35.9 °C)     Review of Systems   Constitution: Negative. HENT: Negative. Eyes: Negative. Cardiovascular: Positive for dyspnea on exertion and palpitations. Negative for chest pain, claudication, cyanosis, irregular heartbeat, leg swelling, near-syncope, orthopnea, paroxysmal nocturnal dyspnea and syncope. Respiratory: Positive for shortness of breath. Negative for cough, hemoptysis, sleep disturbances due to breathing, snoring, sputum production and wheezing. Endocrine: Negative for cold intolerance, heat intolerance, polydipsia, polyphagia and polyuria. Hematologic/Lymphatic: Negative for adenopathy and bleeding problem. Does not bruise/bleed easily. Skin: Negative. Musculoskeletal: Positive for arthritis and back pain. Gastrointestinal: Negative. Genitourinary: Negative for bladder incontinence, decreased libido, dysuria and flank pain. Neurological: Negative. Psychiatric/Behavioral: Negative. Allergic/Immunologic: Negative. Physical Exam  Constitutional:       Appearance: Normal appearance. HENT:      Head: Normocephalic and atraumatic. Nose: Nose normal.   Eyes:      Extraocular Movements: Extraocular movements intact. Pupils: Pupils are equal, round, and reactive to light. Neck:      Musculoskeletal: Normal range of motion. Cardiovascular:      Rate and Rhythm: Normal rate. Rhythm irregular. Heart sounds: Murmur present. No friction rub. No gallop.     Abdominal:

## 2021-01-06 NOTE — TELEPHONE ENCOUNTER
Patient needs to know if she should continue taking aspirin 325 along with her eliquis  Pt #  [phone]

## 2021-01-07 ENCOUNTER — PREP FOR PROCEDURE (OUTPATIENT)
Dept: CARDIOLOGY CLINIC | Age: 66
End: 2021-01-07

## 2021-01-07 ENCOUNTER — TELEPHONE (OUTPATIENT)
Dept: CARDIOLOGY CLINIC | Age: 66
End: 2021-01-07

## 2021-01-07 DIAGNOSIS — I48.91 ATRIAL FIBRILLATION, UNSPECIFIED TYPE (HCC): Primary | ICD-10-CM

## 2021-01-07 RX ORDER — SODIUM CHLORIDE 0.9 % (FLUSH) 0.9 %
10 SYRINGE (ML) INJECTION EVERY 12 HOURS SCHEDULED
Status: CANCELLED | OUTPATIENT
Start: 2021-01-07

## 2021-01-07 RX ORDER — SODIUM CHLORIDE 0.9 % (FLUSH) 0.9 %
10 SYRINGE (ML) INJECTION PRN
Status: CANCELLED | OUTPATIENT
Start: 2021-01-07

## 2021-01-07 RX ORDER — SODIUM CHLORIDE 9 MG/ML
INJECTION, SOLUTION INTRAVENOUS CONTINUOUS
Status: CANCELLED | OUTPATIENT
Start: 2021-01-07

## 2021-01-07 NOTE — TELEPHONE ENCOUNTER
External Cardioversion    Date of Procedure: 2/5/2021    Time of Arrival:0900    Cardiologist performing procedure:Laney    · Arrive at [facility] through the main entrance. Check in at the Outpatient Diagnostic desk on the 1st floor. · Do not eat or drink anything after midnight the night before the procedure. · You may brush your teeth and rinse the morning of the procedure. · Take ALL of your routine medications the morning of the procedure. However, if you are taking diabetic medications, please HOLD on the day of the procedure (including insulin). If you take Lantus insulin, take HALF of your usual dose the night before. · Do NOT stop taking aspirin, Plavix, coumadin, Eliquis, Xarelto, or Pradaxa (any blood thinners)    · Do not put on any lotion, powder, or deodorant the morning of the procedure. · Please bring a list of your medications to the hospital with you. · You must have someone available to drive you home. It is recommended that you do not drive for 24 hours after the procedure. You will also need someone with you at home the night of the procedure. · If you are unable to make this appointment, please call [facility] at [phone].

## 2021-01-27 ENCOUNTER — TELEPHONE (OUTPATIENT)
Dept: CARDIOLOGY CLINIC | Age: 66
End: 2021-01-27

## 2021-02-02 ENCOUNTER — OFFICE VISIT (OUTPATIENT)
Dept: PRIMARY CARE CLINIC | Age: 66
End: 2021-02-02
Payer: MEDICARE

## 2021-02-02 DIAGNOSIS — Z01.818 PREOP EXAMINATION: Primary | ICD-10-CM

## 2021-02-02 DIAGNOSIS — I48.91 ATRIAL FIBRILLATION, UNSPECIFIED TYPE (HCC): ICD-10-CM

## 2021-02-02 LAB
ANION GAP SERPL CALCULATED.3IONS-SCNC: 11 MMOL/L (ref 3–16)
BUN BLDV-MCNC: 19 MG/DL (ref 7–20)
CALCIUM SERPL-MCNC: 9.4 MG/DL (ref 8.3–10.6)
CHLORIDE BLD-SCNC: 99 MMOL/L (ref 99–110)
CO2: 28 MMOL/L (ref 21–32)
CREAT SERPL-MCNC: 1 MG/DL (ref 0.6–1.2)
GFR AFRICAN AMERICAN: >60
GFR NON-AFRICAN AMERICAN: 55
GLUCOSE BLD-MCNC: 117 MG/DL (ref 70–99)
INR BLD: 1.31 (ref 0.86–1.14)
POTASSIUM SERPL-SCNC: 4.3 MMOL/L (ref 3.5–5.1)
PROTHROMBIN TIME: 15.2 SEC (ref 10–13.2)
SODIUM BLD-SCNC: 138 MMOL/L (ref 136–145)

## 2021-02-02 PROCEDURE — G8417 CALC BMI ABV UP PARAM F/U: HCPCS | Performed by: NURSE PRACTITIONER

## 2021-02-02 PROCEDURE — 99211 OFF/OP EST MAY X REQ PHY/QHP: CPT | Performed by: NURSE PRACTITIONER

## 2021-02-02 PROCEDURE — G8428 CUR MEDS NOT DOCUMENT: HCPCS | Performed by: NURSE PRACTITIONER

## 2021-02-03 LAB — SARS-COV-2: NOT DETECTED

## 2021-02-04 NOTE — PRE-PROCEDURE INSTRUCTIONS
Called patient about procedure. Told to be here at 0830 for procedure at 1000. NPO after midnight, but can take morning medication with sips of water, patient stated they are on blood thinners. To have a responsible adult be with patient take them home and stay with them afterwards, if they do not get admitted to [address] [place] [address]. And if available bring current list of medications. No other questions or concerns.

## 2021-02-05 ENCOUNTER — HOSPITAL ENCOUNTER (OUTPATIENT)
Dept: CARDIAC CATH/INVASIVE PROCEDURES | Age: 66
Discharge: HOME | End: 2021-02-07
Payer: MEDICARE

## 2021-02-05 VITALS — WEIGHT: 293 LBS | TEMPERATURE: 98 F | HEIGHT: 68 IN | BODY MASS INDEX: 44.41 KG/M2

## 2021-02-05 LAB
EKG ATRIAL RATE: 82 BPM
EKG ATRIAL RATE: 87 BPM
EKG DIAGNOSIS: NORMAL
EKG DIAGNOSIS: NORMAL
EKG P AXIS: 62 DEGREES
EKG P-R INTERVAL: 162 MS
EKG Q-T INTERVAL: 362 MS
EKG Q-T INTERVAL: 380 MS
EKG QRS DURATION: 94 MS
EKG QRS DURATION: 96 MS
EKG QTC CALCULATION (BAZETT): 418 MS
EKG QTC CALCULATION (BAZETT): 454 MS
EKG R AXIS: -6 DEGREES
EKG R AXIS: 1 DEGREES
EKG T AXIS: 59 DEGREES
EKG T AXIS: 96 DEGREES
EKG VENTRICULAR RATE: 73 BPM
EKG VENTRICULAR RATE: 95 BPM
INR BLD: 1.9 (ref 0.86–1.14)

## 2021-02-05 PROCEDURE — 85610 PROTHROMBIN TIME: CPT

## 2021-02-05 PROCEDURE — 93005 ELECTROCARDIOGRAM TRACING: CPT | Performed by: INTERNAL MEDICINE

## 2021-02-05 PROCEDURE — 93010 ELECTROCARDIOGRAM REPORT: CPT | Performed by: INTERNAL MEDICINE

## 2021-02-05 PROCEDURE — 92960 CARDIOVERSION ELECTRIC EXT: CPT | Performed by: INTERNAL MEDICINE

## 2021-02-05 PROCEDURE — 92960 CARDIOVERSION ELECTRIC EXT: CPT

## 2021-02-05 RX ORDER — SODIUM CHLORIDE 0.9 % (FLUSH) 0.9 %
10 SYRINGE (ML) INJECTION EVERY 12 HOURS SCHEDULED
Status: DISCONTINUED | OUTPATIENT
Start: 2021-02-05 | End: 2021-02-08 | Stop reason: HOSPADM

## 2021-02-05 RX ORDER — SODIUM CHLORIDE 0.9 % (FLUSH) 0.9 %
10 SYRINGE (ML) INJECTION PRN
Status: DISCONTINUED | OUTPATIENT
Start: 2021-02-05 | End: 2021-02-08 | Stop reason: HOSPADM

## 2021-02-05 RX ORDER — SODIUM CHLORIDE 9 MG/ML
INJECTION, SOLUTION INTRAVENOUS CONTINUOUS
Status: DISCONTINUED | OUTPATIENT
Start: 2021-02-05 | End: 2021-02-08 | Stop reason: HOSPADM

## 2021-02-08 ENCOUNTER — TELEPHONE (OUTPATIENT)
Dept: CARDIOLOGY CLINIC | Age: 66
End: 2021-02-08

## 2021-02-09 NOTE — H&P
Signed             Show:Clear all  [x]Manual[x]Template[]Copied    Added by:  [x]Matias Jackson MD    []Khrisver for details  CC:  AF     HPI:  Pt has afib and went to ER on 12/31/20 for SOB. She was discharged with stable vitals. Fatigued and irritable. Has fibromyalgia.     FH: Mom has MI at 80 lived to 80. D normal.  Brother had AS. SH:  No tobacco now. Quit in 2008. 1/2 ppd for 30 yrs. PMH:  AS mild and fibromyalgia. dDD neck and low back. Dep and anxiety  Depression. Obesity.     Meds:  Lisinopril.       ECG AF with HR 96 bpm         Vitals:     01/06/21 1320   BP: 134/68   Pulse: 96   Temp: 96.7 °F (35.9 °C)      Review of Systems   Constitution: Negative. HENT: Negative. Eyes: Negative. Cardiovascular: Positive for dyspnea on exertion and palpitations. Negative for chest pain, claudication, cyanosis, irregular heartbeat, leg swelling, near-syncope, orthopnea, paroxysmal nocturnal dyspnea and syncope. Respiratory: Positive for shortness of breath. Negative for cough, hemoptysis, sleep disturbances due to breathing, snoring, sputum production and wheezing. Endocrine: Negative for cold intolerance, heat intolerance, polydipsia, polyphagia and polyuria. Hematologic/Lymphatic: Negative for adenopathy and bleeding problem. Does not bruise/bleed easily. Skin: Negative. Musculoskeletal: Positive for arthritis and back pain. Gastrointestinal: Negative. Genitourinary: Negative for bladder incontinence, decreased libido, dysuria and flank pain. Neurological: Negative. Psychiatric/Behavioral: Negative. Allergic/Immunologic: Negative.          Physical Exam  Constitutional:       Appearance: Normal appearance. HENT:      Head: Normocephalic and atraumatic. Nose: Nose normal.   Eyes:      Extraocular Movements: Extraocular movements intact. Pupils: Pupils are equal, round, and reactive to light. Neck:      Musculoskeletal: Normal range of motion. Cardiovascular:      Rate and Rhythm: Normal rate. Rhythm irregular. Heart sounds: Murmur present. No friction rub. No gallop. Abdominal:      General: Abdomen is flat. There is no distension. Palpations: Abdomen is soft. There is no mass. Tenderness: There is no abdominal tenderness. Hernia: No hernia is present. Musculoskeletal: Normal range of motion. Skin:     General: Skin is warm and dry. Capillary Refill: Capillary refill takes 2 to 3 seconds. Neurological:      General: No focal deficit present. Mental Status: She is alert and oriented to person, place, and time. Psychiatric:         Mood and Affect: Mood normal.         Behavior: Behavior normal.           Diagnosis Orders   1. Atrial fibrillation, unspecified type ([address]      2. Aortic valve stenosis, etiology of cardiac valve disease unspecified      3. Morbid obesity due to excess calories (HCC)      4. Essential hypertension      5. Hypothyroidism due to medication            AF:  Need to start eliquis 5 bid and try cardioversion in a month. HTN:  Lisinopril. Hypothyroidism:  Trying to regulate. AS: stable repeat echo after SR is restored with cardioversion. Addendum:  Eliquis taken for 1 month. Here for d/c cardioversion. RBA explained and consent obtained. Will proceed. Mallampati 2. ASA 2.

## 2021-02-12 ENCOUNTER — TELEPHONE (OUTPATIENT)
Dept: CARDIOLOGY CLINIC | Age: 66
End: 2021-02-12

## 2021-02-12 NOTE — TELEPHONE ENCOUNTER
Called patient to confirm appt for Monday. She wanted to cancel due to weather. She did want a nurse to call her back because she is having pain and swelling in both of her legs. Please call her about this at [phone]. Thanks.

## 2021-02-16 NOTE — TELEPHONE ENCOUNTER
Spoke with pt and refill for Eliquis called in. She stated that her legs are stable and she will follow up as scheduled.

## 2021-02-25 ENCOUNTER — OFFICE VISIT (OUTPATIENT)
Dept: CARDIOLOGY CLINIC | Age: 66
End: 2021-02-25
Payer: MEDICARE

## 2021-02-25 VITALS
SYSTOLIC BLOOD PRESSURE: 100 MMHG | WEIGHT: 293 LBS | HEART RATE: 59 BPM | BODY MASS INDEX: 50.45 KG/M2 | DIASTOLIC BLOOD PRESSURE: 80 MMHG

## 2021-02-25 DIAGNOSIS — I48.91 ATRIAL FIBRILLATION, UNSPECIFIED TYPE (HCC): Primary | ICD-10-CM

## 2021-02-25 PROCEDURE — G8417 CALC BMI ABV UP PARAM F/U: HCPCS | Performed by: NURSE PRACTITIONER

## 2021-02-25 PROCEDURE — 93000 ELECTROCARDIOGRAM COMPLETE: CPT | Performed by: NURSE PRACTITIONER

## 2021-02-25 PROCEDURE — 4040F PNEUMOC VAC/ADMIN/RCVD: CPT | Performed by: NURSE PRACTITIONER

## 2021-02-25 PROCEDURE — 1090F PRES/ABSN URINE INCON ASSESS: CPT | Performed by: NURSE PRACTITIONER

## 2021-02-25 PROCEDURE — 1123F ACP DISCUSS/DSCN MKR DOCD: CPT | Performed by: NURSE PRACTITIONER

## 2021-02-25 PROCEDURE — 99214 OFFICE O/P EST MOD 30 MIN: CPT | Performed by: NURSE PRACTITIONER

## 2021-02-25 PROCEDURE — G8484 FLU IMMUNIZE NO ADMIN: HCPCS | Performed by: NURSE PRACTITIONER

## 2021-02-25 PROCEDURE — 3017F COLORECTAL CA SCREEN DOC REV: CPT | Performed by: NURSE PRACTITIONER

## 2021-02-25 PROCEDURE — G8427 DOCREV CUR MEDS BY ELIG CLIN: HCPCS | Performed by: NURSE PRACTITIONER

## 2021-02-25 PROCEDURE — G8400 PT W/DXA NO RESULTS DOC: HCPCS | Performed by: NURSE PRACTITIONER

## 2021-02-25 PROCEDURE — 1036F TOBACCO NON-USER: CPT | Performed by: NURSE PRACTITIONER

## 2021-02-25 RX ORDER — AMLODIPINE BESYLATE 10 MG/1
5 TABLET ORAL DAILY
Qty: 90 TABLET | Refills: 1 | Status: ON HOLD
Start: 2021-02-25 | End: 2022-06-06 | Stop reason: SDUPTHER

## 2021-02-25 NOTE — PROGRESS NOTES
[facility]  Office Visit           Charlie Licea MD,  [facility] [address]       Cardiology             Reji Jim  1955 February 25, 2021    Primary Cardiologist:  Tawanna Gonzalez Hx: Ms Rinku Lebron is here after DCCV   EKG today NSR rate 88   c/o wendie leg pain feet warm <1+ edema    On lyrica  / may take tylenol not NSAIDs   Had COVID vaccine / has fibromyalgia   Suspect this combination etiology of leg pain / to have PT   pAF / in NSR today / DCCV 2/5/2021 to NSR   Now on eliquis no falls and Hgb stable    No tobacco now / quit in 2008 / 1/2 ppd for 30 yrs. AS mild and fibromyalgia. DDD neck and low back  GERD on PI    Hyperthyroid   / T4 0.3  12/31/2021  states rechecked and on synthroid   anxiety depression   Obesity Body mass index is 50.45 kg/m². discussed diet and activity   DMT2 on metformin per PCP    I have examined pt and reviewed notes / any lab work EKGs stress test, angiograms, & images reviewed  I  have spent >20 minutes of face to face time with the patient with more than 50% spent counseling and coordinating care this patient. Review of Systems:  Constitutional: Denies  fatigue, weakness, night sweats or fever. HEENT: Denies new visual changes, ringing in ears, nosebleeds,nasal congestion  Respiratory: Denies new or change in SOB, PND, orthopnea or cough. Cardiovascular: see HPI  GI: Denies N/V, diarrhea, constipation, abdominal pain, change in bowel habits, melena or hematochezia  : Denies urinary frequency, urgency, incontinence, hematuria or dysuria. Skin: Denies rash, hives, or cyanosis  Musculoskeletal: Denies joint or muscle aches/pain  Neurological: Denies syncope or TIA-like symptoms.   Psychiatric: Denies anxiety, insomnia or depression     Past Medical History:   Diagnosis Date    Aortic stenosis     Arthritis     Coronary artery disease     Depression     Fatigue     Fibromyalgia (Patient taking differently: 50 mcg Daily Patient states she takes 150 mcgs currently) 90 tablet 0    amLODIPine (NORVASC) 10 MG tablet TAKE ONE TABLET BY MOUTH ONE TIME DAILY 90 tablet 1    buPROPion (WELLBUTRIN XL) 300 MG extended release tablet TAKE 1 TABLET BY MOUTH EVERY MORNING 30 tablet 1    methocarbamol (ROBAXIN) 500 MG tablet TAKE 1 TABLET BY MOUTH 4 TIMES DAILY AS NEEDED (PAIN) 60 tablet 2    omeprazole (PRILOSEC) 40 MG delayed release capsule TAKE ONE CAPSULE BY MOUTH ONE TIME DAILY 90 capsule 1    ibuprofen (ADVIL;MOTRIN) 600 MG tablet Take 1 tablet by mouth 3 times daily as needed for Pain 30 tablet 0    famotidine (PEPCID) 20 MG tablet Take 1 tablet by mouth 2 times daily 60 tablet 2    fluticasone (FLONASE) 50 MCG/ACT nasal spray 2 sprays by Nasal route daily 1 Bottle 0    promethazine (PHENERGAN) 25 MG tablet Take 1 tablet by mouth every 6 hours as needed for Nausea 15 tablet 0    vitamin D (CHOLECALCIFEROL) 5000 units CAPS capsule Take 5,000 Units by mouth daily      Coenzyme Q10 (COQ-10 PO) Take by mouth daily      Calcium-Magnesium-Vitamin D 185- MG-MG-UNIT CAPS Take 1 capsule by mouth daily      acetaminophen (TYLENOL) 325 MG tablet Take 650 mg by mouth every 6 hours as needed for Pain      ibuprofen (ADVIL;MOTRIN) 200 MG tablet Take 400 mg by mouth every 6 hours as needed for Pain       therapeutic multivitamin-minerals (THERAGRAN-M) tablet Take 1 tablet by mouth daily.  Omega-3 Fatty Acids (FISH OIL) 1000 MG CAPS Take 1,000 mg by mouth daily.  pregabalin (LYRICA) 50 MG capsule Take 1 capsule by mouth 2 times daily for 31 days. . 60 capsule 3     No current facility-administered medications for this visit.         Physical Exam:   /80   Pulse 59   Wt (!) 331 lb 12.8 oz (150.5 kg)   BMI 50.45 kg/m²   BP Readings from Last 3 Encounters:   02/25/21 100/80   01/06/21 134/68   12/31/20 131/74     Pulse Readings from Last 3 Encounters:   02/25/21 59   01/06/21 96 12/31/20 85     Wt Readings from Last 3 Encounters:   02/25/21 (!) 331 lb 12.8 oz (150.5 kg)   02/05/21 (!) 328 lb (148.8 kg)   01/06/21 (!) 328 lb (148.8 kg)     Constitutional: She is oriented to person, place, and time. She appears well-developed and well-nourished. In no acute distress. HEENT: Normocephalic and atraumatic. Sclerae anicteric. No xanthelasmas. Conjunctiva white, no subconjunctival hemorrhage   External inspection of ears nose teeth & gums   Eyes:PERRLA EOM's intact. Neck: Neck supple. No JVD present. Carotids without bruits. No mass and no thyromegaly present. No lymphadenopathy present. Cardiovascular: RRR, normal S1 and S2; ++ murmur  Pulmonary/Chest: Effort normal.  Lungs clear to auscultation. Chest wall nontender  Abdominal: soft, nontender, nondistended. + bowel sounds; no organomegaly or bruits. Aorta normal  Extremities: <1+ edema, cyanosis, or clubbing. Pulses are 2+ radial/carotid/dorsalis pedis bilaterally. Cap refill brisk. Neurological: No cranial nerve deficit. Psychiatric: She has a normal mood and affect.  Her speech is normal and behavior is normal.     Lab Review:   Lab Results   Component Value Date    TRIG 142 11/24/2017    HDL 73 11/24/2017    HDL 50 08/19/2011    LDLCALC 124 11/24/2017    LDLDIRECT 72 08/19/2011    LABVLDL 28 11/24/2017     Lab Results   Component Value Date     02/02/2021    K 4.3 02/02/2021    K 4.5 12/31/2020    CL 99 02/02/2021    CO2 28 02/02/2021    BUN 19 02/02/2021    CREATININE 1.0 02/02/2021    GLUCOSE 117 02/02/2021    CALCIUM 9.4 02/02/2021      Lab Results   Component Value Date    WBC 9.4 12/31/2020    HGB 13.0 12/31/2020    HCT 41.0 12/31/2020    MCV 79.4 (L) 12/31/2020     12/31/2020     I have reviewed any available labs, images, any stress test, LHC on this pt       Assessment:    pAF  DCCV in NSR   EKG today NSR rate 88   [id number] Score for Atrial Fibrillation Stroke Risk   Risk   Factors  Component Value   C CHF No 0   H HTN Yes 1   A2 Age >= 76 No,  (76 y.o.) 0   D DM No 0   S2 Prior Stroke/TIA No 0   V Vascular Disease No 0   A Age 74-69 Yes,  (76 y.o.) 1   Sc Sex female 1    [id number]  Score  3   pAF / in NSR today / DCCV 2/5/2021 to NSR   Now on eliquis no falls and Hgb stable      c/o wendie leg pain feet warm <1+ edema    On lyrica  May shavonne to increase it / prefer her to see PCP for this   may take tylenol not NSAIDs   Had COVID vaccine / has fibromyalgia   Suspect this combination etiology of leg pain / to have PT     AS mild and fibromyalgia./ DDD neck and low back    DMT2 on metformin per PCP    GERD on PPI      Hyperthyroid    / T4 0.3  12/31/2021  states rechecked and on synthroid     anxiety depression   wellbutin     Obesity Body mass index is 50.45 kg/m².    discussed diet and activity     Plan:  c/o wendie leg pain feet warm / <1+ edema    Taking eliquis regularly / in NSR   On lyrica  / may take tylenol not NSAIDs   prefer PCP to see she wants Lyrica increased   Had COVID vaccine / has fibromyalgia   Suspect this combination etiology of leg pain / to have PT   Discussed her eliquis / she doesn't want to cont it / We discussed the Watchman and info given   Fu in 3 months with blood work     DIMA Guo

## 2021-03-05 ENCOUNTER — TELEPHONE (OUTPATIENT)
Dept: CARDIOLOGY CLINIC | Age: 66
End: 2021-03-05

## 2021-03-05 NOTE — TELEPHONE ENCOUNTER
The patient called and would like to know if it's ok for her to use Voltaren gel in a compounded form for her knee pain. Please call the patient back at [phone] to advise.

## 2021-03-29 ENCOUNTER — TELEPHONE (OUTPATIENT)
Dept: CARDIOLOGY CLINIC | Age: 66
End: 2021-03-29

## 2021-03-29 NOTE — TELEPHONE ENCOUNTER
CARRI Weir from [facility] asking how long the physician giving the cortisone injection would like the Eliquis to be held.

## 2021-03-29 NOTE — TELEPHONE ENCOUNTER
The patient called stating she is getting a intermuscular Cortizone injection and she would like to know if she needs to hold her Eliquis 5 mg medication. Please call the patient back at [phone] to advise.

## 2021-03-31 NOTE — TELEPHONE ENCOUNTER
Shalonda Rivera from [facility] called back and they only need the patient to be off her blood thinner for 24hrs. Please fax the letter to [phone] so the patient can be scheduled.

## 2021-05-26 ENCOUNTER — TELEPHONE (OUTPATIENT)
Dept: CARDIOLOGY CLINIC | Age: 66
End: 2021-05-26

## 2021-05-26 NOTE — TELEPHONE ENCOUNTER
Patient fell yesterday she is on eliquis and concern. Patient wanted to let Georgie Viera know she fell she is scrapped up and swollen. Patient did bleed She didn't go to the hospital.Patient would like to know what Georgie Viera would want her to do. If no answer leave a message.  Pt # [phone]

## 2021-06-02 ENCOUNTER — CLINICAL DOCUMENTATION (OUTPATIENT)
Dept: OTHER | Age: 66
End: 2021-06-02

## 2021-06-11 NOTE — TELEPHONE ENCOUNTER
Requested Prescriptions     Pending Prescriptions Disp Refills    ELIQUIS 5 MG TABS tablet [Pharmacy Med Name: ELIQUIS 5 MG TABLET 5 Tablet] 180 tablet 3     Sig: Take 1 tablet by mouth 2 times daily                  Last Office Visit: 2/25/2021     Next Office Visit: 6/24/2021

## 2021-06-16 RX ORDER — APIXABAN 5 MG/1
TABLET, FILM COATED ORAL
Qty: 180 TABLET | Refills: 3 | Status: SHIPPED | OUTPATIENT
Start: 2021-06-16 | End: 2022-06-23

## 2021-06-22 DIAGNOSIS — I48.91 ATRIAL FIBRILLATION, UNSPECIFIED TYPE (HCC): ICD-10-CM

## 2021-06-22 LAB
A/G RATIO: 1.7 (ref 1.1–2.2)
ALBUMIN SERPL-MCNC: 4.3 G/DL (ref 3.4–5)
ALP BLD-CCNC: 58 U/L (ref 40–129)
ALT SERPL-CCNC: 15 U/L (ref 10–40)
ANION GAP SERPL CALCULATED.3IONS-SCNC: 13 MMOL/L (ref 3–16)
AST SERPL-CCNC: 16 U/L (ref 15–37)
BILIRUB SERPL-MCNC: 0.4 MG/DL (ref 0–1)
BILIRUBIN DIRECT: <0.2 MG/DL (ref 0–0.3)
BILIRUBIN, INDIRECT: NORMAL MG/DL (ref 0–1)
BUN BLDV-MCNC: 17 MG/DL (ref 7–20)
CALCIUM SERPL-MCNC: 9.6 MG/DL (ref 8.3–10.6)
CHLORIDE BLD-SCNC: 100 MMOL/L (ref 99–110)
CHOLESTEROL, TOTAL: 199 MG/DL (ref 0–199)
CO2: 26 MMOL/L (ref 21–32)
CREAT SERPL-MCNC: 0.8 MG/DL (ref 0.6–1.2)
GFR AFRICAN AMERICAN: >60
GFR NON-AFRICAN AMERICAN: >60
GLOBULIN: 2.6 G/DL
GLUCOSE BLD-MCNC: 120 MG/DL (ref 70–99)
HCT VFR BLD CALC: 41.6 % (ref 36–48)
HDLC SERPL-MCNC: 60 MG/DL (ref 40–60)
HEMOGLOBIN: 13.6 G/DL (ref 12–16)
LDL CHOLESTEROL CALCULATED: 103 MG/DL
MAGNESIUM: 1.9 MG/DL (ref 1.8–2.4)
MCH RBC QN AUTO: 26.4 PG (ref 26–34)
MCHC RBC AUTO-ENTMCNC: 32.6 G/DL (ref 31–36)
MCV RBC AUTO: 81 FL (ref 80–100)
PDW BLD-RTO: 16 % (ref 12.4–15.4)
PLATELET # BLD: 298 K/UL (ref 135–450)
PMV BLD AUTO: 8.6 FL (ref 5–10.5)
POTASSIUM SERPL-SCNC: 4.4 MMOL/L (ref 3.5–5.1)
PRO-BNP: 234 PG/ML (ref 0–124)
RBC # BLD: 5.13 M/UL (ref 4–5.2)
SODIUM BLD-SCNC: 139 MMOL/L (ref 136–145)
TOTAL PROTEIN: 6.9 G/DL (ref 6.4–8.2)
TRIGL SERPL-MCNC: 179 MG/DL (ref 0–150)
TSH REFLEX FT4: 0.74 UIU/ML (ref 0.27–4.2)
VITAMIN D 25-HYDROXY: 49.5 NG/ML
VLDLC SERPL CALC-MCNC: 36 MG/DL
WBC # BLD: 6.5 K/UL (ref 4–11)

## 2021-06-24 ENCOUNTER — OFFICE VISIT (OUTPATIENT)
Dept: CARDIOLOGY CLINIC | Age: 66
End: 2021-06-24
Payer: MEDICARE

## 2021-06-24 VITALS
DIASTOLIC BLOOD PRESSURE: 80 MMHG | WEIGHT: 293 LBS | HEART RATE: 72 BPM | SYSTOLIC BLOOD PRESSURE: 118 MMHG | BODY MASS INDEX: 49.93 KG/M2

## 2021-06-24 DIAGNOSIS — I10 ESSENTIAL HYPERTENSION: ICD-10-CM

## 2021-06-24 DIAGNOSIS — E66.01 OBESITY, MORBID, BMI 40.0-49.9 (HCC): Primary | ICD-10-CM

## 2021-06-24 PROCEDURE — 99214 OFFICE O/P EST MOD 30 MIN: CPT | Performed by: NURSE PRACTITIONER

## 2021-06-24 PROCEDURE — 1036F TOBACCO NON-USER: CPT | Performed by: NURSE PRACTITIONER

## 2021-06-24 PROCEDURE — G8400 PT W/DXA NO RESULTS DOC: HCPCS | Performed by: NURSE PRACTITIONER

## 2021-06-24 PROCEDURE — G8417 CALC BMI ABV UP PARAM F/U: HCPCS | Performed by: NURSE PRACTITIONER

## 2021-06-24 PROCEDURE — 1123F ACP DISCUSS/DSCN MKR DOCD: CPT | Performed by: NURSE PRACTITIONER

## 2021-06-24 PROCEDURE — 4040F PNEUMOC VAC/ADMIN/RCVD: CPT | Performed by: NURSE PRACTITIONER

## 2021-06-24 PROCEDURE — G8427 DOCREV CUR MEDS BY ELIG CLIN: HCPCS | Performed by: NURSE PRACTITIONER

## 2021-06-24 PROCEDURE — 1090F PRES/ABSN URINE INCON ASSESS: CPT | Performed by: NURSE PRACTITIONER

## 2021-06-24 PROCEDURE — 3017F COLORECTAL CA SCREEN DOC REV: CPT | Performed by: NURSE PRACTITIONER

## 2021-06-24 NOTE — PROGRESS NOTES
Dispense Refill    ELIQUIS 5 MG TABS tablet Take 1 tablet by mouth 2 times daily 180 tablet 3    metFORMIN (GLUCOPHAGE) 500 MG tablet Take 1 tablet by mouth 2 times daily (with meals) 180 tablet 3    amLODIPine (NORVASC) 10 MG tablet Take 0.5 tablets by mouth daily (Patient taking differently: Take 10 mg by mouth daily ) 90 tablet 1    LORazepam (ATIVAN) 0.5 MG tablet Take 0.5 mg by mouth every 6 hours as needed for Anxiety.  HYDROcodone-acetaminophen (NORCO) 5-325 MG per tablet Take 1 tablet by mouth every 6 hours as needed for Pain.  busPIRone (BUSPAR) 10 MG tablet Take 10 mg by mouth 2 times daily Unsure of mg amount      Escitalopram Oxalate (LEXAPRO PO) Take by mouth nightly Unsure of mg amount      Misc. Devices [facility]) MISC Use with walking or standing at all times to prevent falls.  1 each 0    levothyroxine (SYNTHROID) 25 MCG tablet TAKE ONE TABLET BY MOUTH DAILY ALONG WITH 200 MCG TABLET (225 MCG TOTAL) (Patient taking differently: 50 mcg Daily Patient states she takes 150 mcgs currently) 90 tablet 0    buPROPion (WELLBUTRIN XL) 300 MG extended release tablet TAKE 1 TABLET BY MOUTH EVERY MORNING 30 tablet 1    methocarbamol (ROBAXIN) 500 MG tablet TAKE 1 TABLET BY MOUTH 4 TIMES DAILY AS NEEDED (PAIN) 60 tablet 2    omeprazole (PRILOSEC) 40 MG delayed release capsule TAKE ONE CAPSULE BY MOUTH ONE TIME DAILY 90 capsule 1    ibuprofen (ADVIL;MOTRIN) 600 MG tablet Take 1 tablet by mouth 3 times daily as needed for Pain 30 tablet 0    famotidine (PEPCID) 20 MG tablet Take 1 tablet by mouth 2 times daily 60 tablet 2    fluticasone (FLONASE) 50 MCG/ACT nasal spray 2 sprays by Nasal route daily 1 Bottle 0    promethazine (PHENERGAN) 25 MG tablet Take 1 tablet by mouth every 6 hours as needed for Nausea 15 tablet 0    vitamin D (CHOLECALCIFEROL) 5000 units CAPS capsule Take 5,000 Units by mouth daily      Coenzyme Q10 (COQ-10 PO) Take by mouth daily      Calcium-Magnesium-Vitamin D 185- MG-MG-UNIT CAPS Take 1 capsule by mouth daily      acetaminophen (TYLENOL) 325 MG tablet Take 650 mg by mouth every 6 hours as needed for Pain      therapeutic multivitamin-minerals (THERAGRAN-M) tablet Take 1 tablet by mouth daily.  Omega-3 Fatty Acids (FISH OIL) 1000 MG CAPS Take 1,000 mg by mouth daily.  pregabalin (LYRICA) 50 MG capsule Take 1 capsule by mouth 2 times daily for 31 days. . 60 capsule 3     No current facility-administered medications for this visit. REVIEW OF SYSTEMS:    CONSTITUTIONAL: No major weight gain or loss, night sweats, fever, fatigue, or weakness. HEENT: No new vision difficulties or ringing in the ears. RESPIRATORY: No new SOB, PND, orthopnea or cough. CARDIOVASCULAR: See HPI  GI: No N/V/D, constipation, or abdominal pain. No black/tarry stools  : No urinary urgency, incontinence, or hematuria. SKIN: No cyanosis or skin lesions. MUSCULOSKELETAL: No new muscle or joint pain. NEUROLOGICAL: No syncope or TIA-like symptoms. PSYCHIATRIC: No anxiety, pain, insomnia or depression        Objective:   PHYSICAL EXAM:        Vitals:    06/24/21 1444   BP: 118/80   Pulse: 72   Weight: (!) 328 lb 6.4 oz (149 kg)      VITALS:  /80   Pulse 72   Wt (!) 328 lb 6.4 oz (149 kg)   BMI 49.93 kg/m²   CONSTITUTIONAL: Cooperative, no apparent distress, and appears well nourished / developed  NEUROLOGIC:  Awake and orientated to person, place, and time. PSYCH: Calm affect. SKIN: Warm and dry. HEENT: Sclera non-icteric, normocephalic, neck supple. RESPIRATORY:  No increased work of breathing and clear to auscultation, no crackles or wheezing. CARDIOVASCULAR:  Regular rate and rhythm without murmur. Normal S1 and S2. No gallops or rubs. Normal PMI. No elevation of JVP. Normal carotid pulses with no bruits. GI:  Normal bowel sounds. Non-distended. Non-tender to palpation  EXT: No edema. No calf tenderness. Pulses are present bilaterally.     Wt Readings from Last 3 Encounters:   06/24/21 (!) 328 lb 6.4 oz (149 kg)   02/25/21 (!) 331 lb 12.8 oz (150.5 kg)   02/05/21 (!) 328 lb (148.8 kg)      Pulse Readings from Last 3 Encounters:   06/24/21 72   02/25/21 59   01/06/21 96     BP Readings from Last 3 Encounters:   06/24/21 118/80   02/25/21 100/80   01/06/21 134/68        DATA:    Lab Results   Component Value Date    ALT 15 06/22/2021    AST 16 06/22/2021    ALKPHOS 58 06/22/2021    BILITOT 0.4 06/22/2021     Lab Results   Component Value Date    CREATININE 0.8 06/22/2021    BUN 17 06/22/2021     06/22/2021    K 4.4 06/22/2021     06/22/2021    CO2 26 06/22/2021     Lab Results   Component Value Date    .00 (H) 12/31/2020     Lab Results   Component Value Date    WBC 6.5 06/22/2021    HGB 13.6 06/22/2021    HCT 41.6 06/22/2021    MCV 81.0 06/22/2021     06/22/2021     Lab Results   Component Value Date    TRIG 179 (H) 06/22/2021    TRIG 142 11/24/2017    TRIG 150 08/29/2017     Lab Results   Component Value Date    HDL 60 06/22/2021    HDL 73 (H) 11/24/2017    HDL 67 (H) 08/29/2017     Lab Results   Component Value Date    LDLCALC 103 (H) 06/22/2021    LDLCALC 124 (H) 11/24/2017    LDLCALC 85 08/29/2017     Lab Results   Component Value Date    LABVLDL 36 06/22/2021    LABVLDL 28 11/24/2017    LABVLDL 30 08/29/2017     Assessment:   pAF  DCCV in NSR   EKG today NSR rate 88   [id number] Score for Atrial Fibrillation Stroke Risk    Risk   Factors   Component Value   C CHF No 0   H HTN Yes 1   A2 Age >= 75 No,  (76 y.o.) 0   D DM No 0   S2 Prior Stroke/TIA No 0   V Vascular Disease No 0   A Age 74-69 Yes,  (76 y.o.) 1   Sc Sex female 1     [id number]  Score   3   pAF / in NSR today / DCCV 2/5/2021 to NSR   Now on eliquis no falls and Hgb stable           AS mild and fibromyalgia./ DDD neck and low back     DMT2 on metformin per PCP     GERD on PPI       Hyperthyroid    / T4 0.3  12/31/2021  states rechecked and on synthroid      anxiety depression   Stable      Obesity Body mass index is 50.45 kg/m².    discussed diet and activity     Plan:   HR regular today   c/o knee pain and doesn't sleep well    On Lyrica for fibromyalgia    Reviewed her blood work wnl   Fu in 6 month with blood work        [employer]     Documentation of today's visit sent to PCP

## 2021-08-19 ENCOUNTER — TELEPHONE (OUTPATIENT)
Dept: CARDIOLOGY CLINIC | Age: 66
End: 2021-08-19

## 2021-08-19 NOTE — TELEPHONE ENCOUNTER
Patient called for Eliquis 5 mg samples. She has about 5 days left of this medication. She can be reached [phone].

## 2021-11-24 ENCOUNTER — HOSPITAL ENCOUNTER (EMERGENCY)
Age: 66
Discharge: HOME | End: 2021-11-24
Attending: EMERGENCY MEDICINE
Payer: MEDICARE

## 2021-11-24 VITALS
HEART RATE: 104 BPM | WEIGHT: 293 LBS | BODY MASS INDEX: 48.61 KG/M2 | DIASTOLIC BLOOD PRESSURE: 97 MMHG | OXYGEN SATURATION: 100 % | TEMPERATURE: 98.3 F | SYSTOLIC BLOOD PRESSURE: 160 MMHG | RESPIRATION RATE: 18 BRPM

## 2021-11-24 DIAGNOSIS — H11.32 SUBCONJUNCTIVAL HEMORRHAGE OF LEFT EYE: Primary | ICD-10-CM

## 2021-11-24 PROCEDURE — 99283 EMERGENCY DEPT VISIT LOW MDM: CPT

## 2021-11-24 PROCEDURE — 6370000000 HC RX 637 (ALT 250 FOR IP): Performed by: EMERGENCY MEDICINE

## 2021-11-24 RX ORDER — TOBRAMYCIN 3 MG/ML
1 SOLUTION/ DROPS OPHTHALMIC EVERY 4 HOURS
Qty: 5 ML | Refills: 0 | Status: SHIPPED | OUTPATIENT
Start: 2021-11-24 | End: 2021-12-04

## 2021-11-24 RX ORDER — TETRACAINE HYDROCHLORIDE 5 MG/ML
1 SOLUTION OPHTHALMIC ONCE
Status: COMPLETED | OUTPATIENT
Start: 2021-11-24 | End: 2021-11-24

## 2021-11-24 RX ADMIN — TETRACAINE HYDROCHLORIDE 1 DROP: 5 SOLUTION OPHTHALMIC at 04:08

## 2021-11-24 RX ADMIN — FLUORESCEIN SODIUM 1 MG: 1 STRIP OPHTHALMIC at 04:08

## 2021-11-24 ASSESSMENT — VISUAL ACUITY
OD: 20/30
OS: 20/30
OU: 20/30

## 2021-11-24 NOTE — ED PROVIDER NOTES
[address] [facility]  eMERGENCY dEPARTMENT eNCOUnter      Pt Name: Ana Maria Morley  MRN: [id number]  [place] 1955  Date of evaluation: 2021  Provider: Mj Flores MD  PCP: No primary care provider on file. CHIEF COMPLAINT       Chief Complaint   Patient presents with    Eye Problem       HISTORY OFPRESENT ILLNESS   (Location/Symptom, Timing/Onset, Context/Setting, Quality, Duration, Modifying Factors,Severity)  Note limiting factors. Ana Maria Morley is a 74 y.o. female looked in the mirror and saw that she had blood in her sclera she denies any pain a little bit of blurry vision. Denies any trauma denies any fever or chills    Nursing Notes were all reviewed and agreed with or any disagreements were addressed  in the HPI. REVIEW OF SYSTEMS    (2-9 systems for level 4, 10 or more for level 5)     Review of Systems    Positives and Pertinent negatives as per HPI. Except as noted above in the ROS, all other systems were reviewed andnegative.        PASTMEDICAL HISTORY     Past Medical History:   Diagnosis Date    Aortic stenosis     Arthritis     Coronary artery disease     Depression     Fatigue     Fibromyalgia     Hyperlipidemia     Hypertension     Hypothyroidism     Pancreatitis     Type II or unspecified type diabetes mellitus without mention of complication, not stated as uncontrolled          SURGICAL HISTORY       Past Surgical History:   Procedure Laterality Date    ANKLE FRACTURE SURGERY      right     SECTION      CHOLECYSTECTOMY, LAPAROSCOPIC  2014    LAPAROSCOPIC CHOLECYSTECTOMY WITH INTRAOPERATIVE    COLONOSCOPY      ENDOSCOPY, COLON, DIAGNOSTIC      HYSTERECTOMY      LYMPH NODE BIOPSY           CURRENT MEDICATIONS       Previous Medications    ACETAMINOPHEN (TYLENOL) 325 MG TABLET    Take 650 mg by mouth every 6 hours as needed for Pain    AMLODIPINE (NORVASC) 10 MG TABLET    Take 0.5 tablets by mouth daily    BUPROPION (WELLBUTRIN XL) 300 MG EXTENDED RELEASE TABLET    TAKE 1 TABLET BY MOUTH EVERY MORNING    BUSPIRONE (BUSPAR) 10 MG TABLET    Take 10 mg by mouth 2 times daily Unsure of mg amount    CALCIUM-MAGNESIUM-VITAMIN D 185- MG-MG-UNIT CAPS    Take 1 capsule by mouth daily    COENZYME Q10 (COQ-10 PO)    Take by mouth daily    ELIQUIS 5 MG TABS TABLET    Take 1 tablet by mouth 2 times daily    ESCITALOPRAM OXALATE (LEXAPRO PO)    Take by mouth nightly Unsure of mg amount    FAMOTIDINE (PEPCID) 20 MG TABLET    Take 1 tablet by mouth 2 times daily    FLUTICASONE (FLONASE) 50 MCG/ACT NASAL SPRAY    2 sprays by Nasal route daily    HYDROCODONE-ACETAMINOPHEN (NORCO) 5-325 MG PER TABLET    Take 1 tablet by mouth every 6 hours as needed for Pain. LEVOTHYROXINE (SYNTHROID) 25 MCG TABLET    TAKE ONE TABLET BY MOUTH DAILY ALONG WITH 200 MCG TABLET (225 MCG TOTAL)    LORAZEPAM (ATIVAN) 0.5 MG TABLET    Take 0.5 mg by mouth every 6 hours as needed for Anxiety. METFORMIN (GLUCOPHAGE) 500 MG TABLET    Take 1 tablet by mouth 2 times daily (with meals)    METHOCARBAMOL (ROBAXIN) 500 MG TABLET    TAKE 1 TABLET BY MOUTH 4 TIMES DAILY AS NEEDED (PAIN)    MISC. DEVICES (WALKER) MISC    Use with walking or standing at all times to prevent falls. OMEGA-3 FATTY ACIDS (FISH OIL) 1000 MG CAPS    Take 1,000 mg by mouth daily. OMEPRAZOLE (PRILOSEC) 40 MG DELAYED RELEASE CAPSULE    TAKE ONE CAPSULE BY MOUTH ONE TIME DAILY    PREGABALIN (LYRICA) 50 MG CAPSULE    Take 1 capsule by mouth 2 times daily for 31 days. Margurette Estrella PROMETHAZINE (PHENERGAN) 25 MG TABLET    Take 1 tablet by mouth every 6 hours as needed for Nausea    THERAPEUTIC MULTIVITAMIN-MINERALS (THERAGRAN-M) TABLET    Take 1 tablet by mouth daily.     VITAMIN D (CHOLECALCIFEROL) 5000 UNITS CAPS CAPSULE    Take 5,000 Units by mouth daily       ALLERGIES     Cephalosporins, Dilaudid [hydromorphone hcl], Fortaz [ceftazidime], and Statins    FAMILY HISTORY Family History   Problem Relation Age of Onset    Diabetes Mother     Hypertension Mother     Stroke Father     Heart Disease Brother         aortic stenosis          SOCIAL HISTORY       Social History     Socioeconomic History    Marital status:      Spouse name: None    Number of children: None    Years of education: None    Highest education level: None   Occupational History    None   Tobacco Use    Smoking status: Former Smoker     Quit date: 2009     Years since quittin.9    Smokeless tobacco: Never Used    Tobacco comment: quit    Vaping Use    Vaping Use: Never used   Substance and Sexual Activity    Alcohol use: No    Drug use: No    Sexual activity: None   Other Topics Concern    None   Social History Narrative    None     Social Determinants of Health     Financial Resource Strain:     Difficulty of Paying Living Expenses: Not on file   Food Insecurity:     Worried About Running Out of Food in the Last Year: Not on file    Olivier of Food in the Last Year: Not on file   Transportation Needs:     Lack of Transportation (Medical): Not on file    Lack of Transportation (Non-Medical):  Not on file   Physical Activity:     Days of Exercise per Week: Not on file    Minutes of Exercise per Session: Not on file   Stress:     Feeling of Stress : Not on file   Social Connections:     Frequency of Communication with Friends and Family: Not on file    Frequency of Social Gatherings with Friends and Family: Not on file    Attends Rastafarian Services: Not on file    Active Member of Clubs or Organizations: Not on file    Attends Club or Organization Meetings: Not on file    Marital Status: Not on file   Intimate Partner Violence:     Fear of Current or Ex-Partner: Not on file    Emotionally Abused: Not on file    Physically Abused: Not on file    Sexually Abused: Not on file   Housing Stability:     Unable to Pay for Housing in the Last Year: Not on file    Number of Places Lived in the Last Year: Not on file    Unstable Housing in the Last Year: Not on file       SCREENINGS      @FLOW([id number])@      PHYSICAL EXAM    (up to 7 for level 4, 8 or more for level 5)     ED Triage Vitals [11/24/21 0356]   BP Temp Temp Source Pulse Resp SpO2 Height Weight   (!) 160/97 98.3 °F (36.8 °C) Oral 104 18 100 % -- (!) 319 lb 11.2 oz (145 kg)       Physical Exam      General Appearance:  Alert, cooperative, no distress, appears stated age. Head:  Normocephalic, without obviousabnormality, atraumatic. Eyes:  conjunctiva/corneas clear, EOM's intact. Sclera anicteric. ENT: Mucous membranes moist.   Neck: Supple, symmetrical, trachea midline, no adenopathy. No jugular venous distention. Lungs:   Clear to auscultation bilaterally, respirationsunlabored. No rales, rhonchi or wheezes. Chest Wall:  No tenderness. Heart:  Regular rate and rhythm, S1 and S2 normal, no murmur, rub or gallop. Abdomen:   Soft, non-tender, bowel sounds active,   no masses, no organomegaly. Extremities: No edema, cords or calf tenderness. Full range of motion. Pulses: 2+ and symmetric   Skin: Turgor is normal, no rashes or lesions. Neurologic: Alert and oriented X 3. No focal findings. Motor grossly normal.  Speech clear, no drift, CN III-XII grossly intact,        DIAGNOSTIC RESULTS   LABS:    Labs Reviewed - No data to display    All other labs were within normal range or not returned as of this dictation. EKG: All EKG's are interpreted by the Emergency Department Physician who eithersigns or Co-signs this chart in the absence of a cardiologist.        RADIOLOGY:   Non-plain film images such as CT, Ultrasound and MRI are read by the radiologist. Plain radiographic images are visualized by myself.       *    Interpretation per the Radiologist below, if available at the time of this note:    No orders to display         PROCEDURES   Unless otherwise noted below, none Procedures  Fluorescein exam was performed with tetracaine and fluorescein strip exam was negative  *    CRITICAL CARE TIME   N/A      EMERGENCY DEPARTMENT COURSE and DIFFERENTIALDIAGNOSIS/MDM:   Vitals:    Vitals:    11/24/21 0356   BP: (!) 160/97   Pulse: 104   Resp: 18   Temp: 98.3 °F (36.8 °C)   TempSrc: Oral   SpO2: 100%   Weight: (!) 319 lb 11.2 oz (145 kg)       Patient was given thefollowing medications:  Medications   tetracaine (TETRAVISC) 0.5 % ophthalmic solution 1 drop (1 drop Left Eye Given 11/24/21 0408)   fluorescein ophthalmic strip 1 mg (1 mg Left Eye Given 11/24/21 0408)           The patient tolerated their visit well. The patient and / or the familywere informed of the results of any tests, a time was given to answer questions. FINAL IMPRESSION      1.  Subconjunctival hemorrhage of left eye          DISPOSITION/PLAN   DISPOSITION Decision To Discharge 11/24/2021 04:12:56 AM    Final diagnosis is subconjunctival hemorrhage patient will follow up with Adam TO:  Perez  [phone]    In 2 days        DISCHARGE MEDICATIONS:  New Prescriptions    TOBRAMYCIN (TOBREX) 0.3 % OPHTHALMIC SOLUTION    Place 1 drop into the left eye every 4 hours for 10 days       DISCONTINUED MEDICATIONS:  Discontinued Medications    No medications on file              (Please note that portions of this note were completed with a voice recognition program.  Efforts were made to edit the dictations but occasionally words are mis-transcribed.)    Amanda Zafar MD (electronically signed)      Amanda Zafar MD  11/24/21 1931

## 2021-11-24 NOTE — ED NOTES
Patient prepared for and ready to be discharged. Patient discharged at this time in no acute distress after verbalizing understanding of discharge instructions. Patient left after receiving After Visit Summary instructions.         Jennifer Hedrick RN  11/24/21 4914

## 2022-03-08 ENCOUNTER — TELEPHONE (OUTPATIENT)
Dept: CARDIOLOGY CLINIC | Age: 67
End: 2022-03-08

## 2022-05-27 ENCOUNTER — OFFICE VISIT (OUTPATIENT)
Dept: CARDIOLOGY CLINIC | Age: 67
End: 2022-05-27
Payer: MEDICARE

## 2022-05-27 VITALS
WEIGHT: 293 LBS | DIASTOLIC BLOOD PRESSURE: 80 MMHG | SYSTOLIC BLOOD PRESSURE: 130 MMHG | HEART RATE: 73 BPM | BODY MASS INDEX: 49.29 KG/M2

## 2022-05-27 DIAGNOSIS — R00.1 BRADYCARDIA: Primary | ICD-10-CM

## 2022-05-27 DIAGNOSIS — I48.0 PAROXYSMAL ATRIAL FIBRILLATION (HCC): ICD-10-CM

## 2022-05-27 DIAGNOSIS — I10 HTN (HYPERTENSION), BENIGN: ICD-10-CM

## 2022-05-27 PROCEDURE — G8400 PT W/DXA NO RESULTS DOC: HCPCS | Performed by: INTERNAL MEDICINE

## 2022-05-27 PROCEDURE — 1123F ACP DISCUSS/DSCN MKR DOCD: CPT | Performed by: INTERNAL MEDICINE

## 2022-05-27 PROCEDURE — 99214 OFFICE O/P EST MOD 30 MIN: CPT | Performed by: INTERNAL MEDICINE

## 2022-05-27 PROCEDURE — G8417 CALC BMI ABV UP PARAM F/U: HCPCS | Performed by: INTERNAL MEDICINE

## 2022-05-27 PROCEDURE — 1036F TOBACCO NON-USER: CPT | Performed by: INTERNAL MEDICINE

## 2022-05-27 PROCEDURE — 1090F PRES/ABSN URINE INCON ASSESS: CPT | Performed by: INTERNAL MEDICINE

## 2022-05-27 PROCEDURE — G8427 DOCREV CUR MEDS BY ELIG CLIN: HCPCS | Performed by: INTERNAL MEDICINE

## 2022-05-27 PROCEDURE — 93000 ELECTROCARDIOGRAM COMPLETE: CPT | Performed by: INTERNAL MEDICINE

## 2022-05-27 PROCEDURE — 3017F COLORECTAL CA SCREEN DOC REV: CPT | Performed by: INTERNAL MEDICINE

## 2022-05-27 RX ORDER — NORTRIPTYLINE HYDROCHLORIDE 75 MG/1
100 CAPSULE ORAL NIGHTLY
COMMUNITY
Start: 2022-04-04

## 2022-05-27 ASSESSMENT — ENCOUNTER SYMPTOMS
BACK PAIN: 1
GASTROINTESTINAL NEGATIVE: 1
COUGH: 0
ALLERGIC/IMMUNOLOGIC NEGATIVE: 1
WHEEZING: 0
ORTHOPNEA: 0
SNORING: 0
SLEEP DISTURBANCES DUE TO BREATHING: 0
SHORTNESS OF BREATH: 1
SPUTUM PRODUCTION: 0
EYES NEGATIVE: 1
HEMOPTYSIS: 0

## 2022-05-27 NOTE — PROGRESS NOTES
CC: AF    HPI:  Pt has afib and went to ER on 12/31/20 for SOB. She was discharged with stable vitals. Fatigued and irritable. Has fibromyalgia. FH:  Mom has MI at 80 lived to 80. D normal.  Brother had AS. SH:  No tobacco now. Quit in 2008. 1/2 ppd for 30 yrs. PMH:  AS mild and fibromyalgia. DDD neck and low back. Dep and anxiety  Depression. Obesity. Meds:  Lisinopril. ECG AF with HR 96 bpm    Vitals:    05/27/22 1419   BP: 130/80   Pulse: 73     Review of Systems   Constitutional: Negative. HENT: Negative. Eyes: Negative. Cardiovascular: Positive for dyspnea on exertion and palpitations. Negative for chest pain, claudication, cyanosis, irregular heartbeat, leg swelling, near-syncope, orthopnea, paroxysmal nocturnal dyspnea and syncope. Respiratory: Positive for shortness of breath. Negative for cough, hemoptysis, sleep disturbances due to breathing, snoring, sputum production and wheezing. Endocrine: Negative for cold intolerance, heat intolerance, polydipsia, polyphagia and polyuria. Hematologic/Lymphatic: Negative for adenopathy and bleeding problem. Does not bruise/bleed easily. Skin: Negative. Musculoskeletal: Positive for arthritis and back pain. Gastrointestinal: Negative. Genitourinary: Negative for bladder incontinence, decreased libido, dysuria and flank pain. Neurological: Negative. Psychiatric/Behavioral: Negative. Allergic/Immunologic: Negative. Physical Exam  Constitutional:       Appearance: Normal appearance. HENT:      Head: Normocephalic and atraumatic. Nose: Nose normal.   Eyes:      Extraocular Movements: Extraocular movements intact. Pupils: Pupils are equal, round, and reactive to light. Cardiovascular:      Rate and Rhythm: Normal rate. Rhythm irregular. Heart sounds: Murmur heard. No friction rub. No gallop. Abdominal:      General: Abdomen is flat. There is no distension.       Palpations: Abdomen is soft. There is no mass. Tenderness: There is no abdominal tenderness. Hernia: No hernia is present. Musculoskeletal:         General: Normal range of motion. Cervical back: Normal range of motion. Skin:     General: Skin is warm and dry. Capillary Refill: Capillary refill takes 2 to 3 seconds. Neurological:      General: No focal deficit present. Mental Status: She is alert and oriented to person, place, and time. Psychiatric:         Mood and Affect: Mood normal.         Behavior: Behavior normal.         Diagnosis Orders   1. Bradycardia  EKG 12 Lead   2. Paroxysmal atrial fibrillation (HCC)     3. HTN (hypertension), benign         AF:  Need to start eliquis 5 bid and try cardioversion in a month. HTN:  Lisinopril. Controlled. Hypothyroidism:  Trying to regulate. AS: stable repeat echo after SR is restored with cardioversion.

## 2022-06-02 ENCOUNTER — HOSPITAL ENCOUNTER (INPATIENT)
Age: 67
LOS: 3 days | Discharge: HOME | DRG: 287 | End: 2022-06-06
Attending: EMERGENCY MEDICINE | Admitting: HOSPITALIST
Payer: MEDICARE

## 2022-06-02 ENCOUNTER — APPOINTMENT (OUTPATIENT)
Dept: GENERAL RADIOLOGY | Age: 67
DRG: 287 | End: 2022-06-02
Payer: MEDICARE

## 2022-06-02 DIAGNOSIS — B02.29 POST HERPETIC NEURALGIA: ICD-10-CM

## 2022-06-02 DIAGNOSIS — R07.9 CHEST PAIN, UNSPECIFIED TYPE: Primary | ICD-10-CM

## 2022-06-02 DIAGNOSIS — E03.9 ACQUIRED HYPOTHYROIDISM: ICD-10-CM

## 2022-06-02 LAB
A/G RATIO: 1.2 (ref 1.1–2.2)
ALBUMIN SERPL-MCNC: 4.1 G/DL (ref 3.4–5)
ALP BLD-CCNC: 71 U/L (ref 40–129)
ALT SERPL-CCNC: 12 U/L (ref 10–40)
ANION GAP SERPL CALCULATED.3IONS-SCNC: 8 MMOL/L (ref 3–16)
AST SERPL-CCNC: 19 U/L (ref 15–37)
BASOPHILS ABSOLUTE: 0.1 K/UL (ref 0–0.2)
BASOPHILS RELATIVE PERCENT: 0.8 %
BILIRUB SERPL-MCNC: <0.2 MG/DL (ref 0–1)
BUN BLDV-MCNC: 19 MG/DL (ref 7–20)
CALCIUM SERPL-MCNC: 8.8 MG/DL (ref 8.3–10.6)
CHLORIDE BLD-SCNC: 101 MMOL/L (ref 99–110)
CO2: 28 MMOL/L (ref 21–32)
CREAT SERPL-MCNC: 1 MG/DL (ref 0.6–1.2)
D DIMER: <0.27 UG/ML FEU (ref 0–0.6)
EOSINOPHILS ABSOLUTE: 0.2 K/UL (ref 0–0.6)
EOSINOPHILS RELATIVE PERCENT: 2 %
GFR AFRICAN AMERICAN: >60
GFR NON-AFRICAN AMERICAN: 55
GLUCOSE BLD-MCNC: 124 MG/DL (ref 70–99)
HCT VFR BLD CALC: 41.9 % (ref 36–48)
HEMOGLOBIN: 13.3 G/DL (ref 12–16)
LYMPHOCYTES ABSOLUTE: 2.6 K/UL (ref 1–5.1)
LYMPHOCYTES RELATIVE PERCENT: 31.2 %
MCH RBC QN AUTO: 25.1 PG (ref 26–34)
MCHC RBC AUTO-ENTMCNC: 31.8 G/DL (ref 31–36)
MCV RBC AUTO: 79 FL (ref 80–100)
MONOCYTES ABSOLUTE: 0.6 K/UL (ref 0–1.3)
MONOCYTES RELATIVE PERCENT: 6.7 %
NEUTROPHILS ABSOLUTE: 4.9 K/UL (ref 1.7–7.7)
NEUTROPHILS RELATIVE PERCENT: 59.3 %
PDW BLD-RTO: 16.4 % (ref 12.4–15.4)
PLATELET # BLD: 316 K/UL (ref 135–450)
PMV BLD AUTO: 7.7 FL (ref 5–10.5)
POTASSIUM SERPL-SCNC: 5.2 MMOL/L (ref 3.5–5.1)
PRO-BNP: 253 PG/ML (ref 0–124)
RBC # BLD: 5.31 M/UL (ref 4–5.2)
SODIUM BLD-SCNC: 137 MMOL/L (ref 136–145)
TOTAL PROTEIN: 7.4 G/DL (ref 6.4–8.2)
TROPONIN: <0.01 NG/ML
WBC # BLD: 8.3 K/UL (ref 4–11)

## 2022-06-02 PROCEDURE — 83880 ASSAY OF NATRIURETIC PEPTIDE: CPT

## 2022-06-02 PROCEDURE — 93005 ELECTROCARDIOGRAM TRACING: CPT | Performed by: EMERGENCY MEDICINE

## 2022-06-02 PROCEDURE — 6370000000 HC RX 637 (ALT 250 FOR IP): Performed by: PHYSICIAN ASSISTANT

## 2022-06-02 PROCEDURE — 71045 X-RAY EXAM CHEST 1 VIEW: CPT

## 2022-06-02 PROCEDURE — 85379 FIBRIN DEGRADATION QUANT: CPT

## 2022-06-02 PROCEDURE — 36415 COLL VENOUS BLD VENIPUNCTURE: CPT

## 2022-06-02 PROCEDURE — 80053 COMPREHEN METABOLIC PANEL: CPT

## 2022-06-02 PROCEDURE — 84484 ASSAY OF TROPONIN QUANT: CPT

## 2022-06-02 PROCEDURE — 85025 COMPLETE CBC W/AUTO DIFF WBC: CPT

## 2022-06-02 PROCEDURE — 99285 EMERGENCY DEPT VISIT HI MDM: CPT

## 2022-06-02 RX ORDER — ASPIRIN 81 MG/1
324 TABLET, CHEWABLE ORAL ONCE
Status: COMPLETED | OUTPATIENT
Start: 2022-06-02 | End: 2022-06-02

## 2022-06-02 RX ADMIN — ASPIRIN 324 MG: 81 TABLET, CHEWABLE ORAL at 22:58

## 2022-06-02 ASSESSMENT — ENCOUNTER SYMPTOMS
NAUSEA: 1
VOMITING: 0
RHINORRHEA: 0
COUGH: 0
DIARRHEA: 0
ABDOMINAL PAIN: 0
SHORTNESS OF BREATH: 1

## 2022-06-03 PROBLEM — R07.9 CHEST PAIN: Status: ACTIVE | Noted: 2022-06-03

## 2022-06-03 LAB
A/G RATIO: 1.2 (ref 1.1–2.2)
ALBUMIN SERPL-MCNC: 3.7 G/DL (ref 3.4–5)
ALP BLD-CCNC: 68 U/L (ref 40–129)
ALT SERPL-CCNC: 10 U/L (ref 10–40)
ANION GAP SERPL CALCULATED.3IONS-SCNC: 9 MMOL/L (ref 3–16)
AST SERPL-CCNC: 13 U/L (ref 15–37)
BILIRUB SERPL-MCNC: <0.2 MG/DL (ref 0–1)
BUN BLDV-MCNC: 18 MG/DL (ref 7–20)
CALCIUM SERPL-MCNC: 8.9 MG/DL (ref 8.3–10.6)
CHLORIDE BLD-SCNC: 98 MMOL/L (ref 99–110)
CHOLESTEROL, FASTING: 185 MG/DL (ref 0–199)
CO2: 28 MMOL/L (ref 21–32)
CREAT SERPL-MCNC: 1 MG/DL (ref 0.6–1.2)
EKG ATRIAL RATE: 78 BPM
EKG ATRIAL RATE: 91 BPM
EKG DIAGNOSIS: NORMAL
EKG DIAGNOSIS: NORMAL
EKG P AXIS: 30 DEGREES
EKG P AXIS: 43 DEGREES
EKG P-R INTERVAL: 122 MS
EKG P-R INTERVAL: 134 MS
EKG Q-T INTERVAL: 372 MS
EKG Q-T INTERVAL: 414 MS
EKG QRS DURATION: 114 MS
EKG QRS DURATION: 114 MS
EKG QTC CALCULATION (BAZETT): 457 MS
EKG QTC CALCULATION (BAZETT): 471 MS
EKG R AXIS: -16 DEGREES
EKG R AXIS: -7 DEGREES
EKG T AXIS: 116 DEGREES
EKG T AXIS: 71 DEGREES
EKG VENTRICULAR RATE: 78 BPM
EKG VENTRICULAR RATE: 91 BPM
ESTIMATED AVERAGE GLUCOSE: 128.4 MG/DL
GFR AFRICAN AMERICAN: >60
GFR NON-AFRICAN AMERICAN: 55
GLUCOSE BLD-MCNC: 102 MG/DL (ref 70–99)
GLUCOSE BLD-MCNC: 104 MG/DL (ref 70–99)
GLUCOSE BLD-MCNC: 107 MG/DL (ref 70–99)
GLUCOSE BLD-MCNC: 111 MG/DL (ref 70–99)
GLUCOSE BLD-MCNC: 123 MG/DL (ref 70–99)
GLUCOSE BLD-MCNC: 147 MG/DL (ref 70–99)
HBA1C MFR BLD: 6.1 %
HDLC SERPL-MCNC: 48 MG/DL (ref 40–60)
LDL CHOLESTEROL CALCULATED: 114 MG/DL
LV EF: 55 %
LVEF MODALITY: NORMAL
MAGNESIUM: 2.1 MG/DL (ref 1.8–2.4)
PERFORMED ON: ABNORMAL
POTASSIUM REFLEX MAGNESIUM: 4.5 MMOL/L (ref 3.5–5.1)
SODIUM BLD-SCNC: 135 MMOL/L (ref 136–145)
TOTAL PROTEIN: 6.8 G/DL (ref 6.4–8.2)
TRIGLYCERIDE, FASTING: 115 MG/DL (ref 0–150)
TROPONIN: 0.02 NG/ML
TROPONIN: 0.03 NG/ML
TSH REFLEX: 1.11 UIU/ML (ref 0.27–4.2)
VLDLC SERPL CALC-MCNC: 23 MG/DL

## 2022-06-03 PROCEDURE — 99223 1ST HOSP IP/OBS HIGH 75: CPT | Performed by: INTERNAL MEDICINE

## 2022-06-03 PROCEDURE — 2060000000 HC ICU INTERMEDIATE R&B

## 2022-06-03 PROCEDURE — G0378 HOSPITAL OBSERVATION PER HR: HCPCS

## 2022-06-03 PROCEDURE — 6370000000 HC RX 637 (ALT 250 FOR IP): Performed by: INTERNAL MEDICINE

## 2022-06-03 PROCEDURE — 6360000004 HC RX CONTRAST MEDICATION: Performed by: INTERNAL MEDICINE

## 2022-06-03 PROCEDURE — 93458 L HRT ARTERY/VENTRICLE ANGIO: CPT

## 2022-06-03 PROCEDURE — 4A023N7 MEASUREMENT OF CARDIAC SAMPLING AND PRESSURE, LEFT HEART, PERCUTANEOUS APPROACH: ICD-10-PCS | Performed by: ANESTHESIOLOGY

## 2022-06-03 PROCEDURE — 6360000002 HC RX W HCPCS

## 2022-06-03 PROCEDURE — 2580000003 HC RX 258: Performed by: HOSPITALIST

## 2022-06-03 PROCEDURE — 93010 ELECTROCARDIOGRAM REPORT: CPT | Performed by: INTERNAL MEDICINE

## 2022-06-03 PROCEDURE — C1769 GUIDE WIRE: HCPCS

## 2022-06-03 PROCEDURE — 6370000000 HC RX 637 (ALT 250 FOR IP): Performed by: HOSPITALIST

## 2022-06-03 PROCEDURE — C1894 INTRO/SHEATH, NON-LASER: HCPCS

## 2022-06-03 PROCEDURE — 80053 COMPREHEN METABOLIC PANEL: CPT

## 2022-06-03 PROCEDURE — 93005 ELECTROCARDIOGRAM TRACING: CPT | Performed by: NURSE PRACTITIONER

## 2022-06-03 PROCEDURE — 84443 ASSAY THYROID STIM HORMONE: CPT

## 2022-06-03 PROCEDURE — 80061 LIPID PANEL: CPT

## 2022-06-03 PROCEDURE — 6370000000 HC RX 637 (ALT 250 FOR IP): Performed by: NURSE PRACTITIONER

## 2022-06-03 PROCEDURE — 84484 ASSAY OF TROPONIN QUANT: CPT

## 2022-06-03 PROCEDURE — 83735 ASSAY OF MAGNESIUM: CPT

## 2022-06-03 PROCEDURE — 99152 MOD SED SAME PHYS/QHP 5/>YRS: CPT

## 2022-06-03 PROCEDURE — 99291 CRITICAL CARE FIRST HOUR: CPT | Performed by: INTERNAL MEDICINE

## 2022-06-03 PROCEDURE — B2111ZZ FLUOROSCOPY OF MULTIPLE CORONARY ARTERIES USING LOW OSMOLAR CONTRAST: ICD-10-PCS | Performed by: ANESTHESIOLOGY

## 2022-06-03 PROCEDURE — C8929 TTE W OR WO FOL WCON,DOPPLER: HCPCS

## 2022-06-03 PROCEDURE — 36415 COLL VENOUS BLD VENIPUNCTURE: CPT

## 2022-06-03 PROCEDURE — 2709999900 HC NON-CHARGEABLE SUPPLY

## 2022-06-03 PROCEDURE — 2500000003 HC RX 250 WO HCPCS

## 2022-06-03 PROCEDURE — 83036 HEMOGLOBIN GLYCOSYLATED A1C: CPT

## 2022-06-03 PROCEDURE — 99153 MOD SED SAME PHYS/QHP EA: CPT

## 2022-06-03 RX ORDER — ONDANSETRON 2 MG/ML
4 INJECTION INTRAMUSCULAR; INTRAVENOUS EVERY 6 HOURS PRN
Status: DISCONTINUED | OUTPATIENT
Start: 2022-06-03 | End: 2022-06-06 | Stop reason: HOSPADM

## 2022-06-03 RX ORDER — NITROGLYCERIN 0.4 MG/1
0.4 TABLET SUBLINGUAL EVERY 5 MIN PRN
Status: DISCONTINUED | OUTPATIENT
Start: 2022-06-03 | End: 2022-06-06 | Stop reason: HOSPADM

## 2022-06-03 RX ORDER — METHOCARBAMOL 500 MG/1
500 TABLET, FILM COATED ORAL 4 TIMES DAILY PRN
Status: DISCONTINUED | OUTPATIENT
Start: 2022-06-03 | End: 2022-06-06 | Stop reason: HOSPADM

## 2022-06-03 RX ORDER — HYDRALAZINE HYDROCHLORIDE 20 MG/ML
10 INJECTION INTRAMUSCULAR; INTRAVENOUS EVERY 6 HOURS PRN
Status: DISCONTINUED | OUTPATIENT
Start: 2022-06-03 | End: 2022-06-06 | Stop reason: HOSPADM

## 2022-06-03 RX ORDER — POLYETHYLENE GLYCOL 3350 17 G/17G
17 POWDER, FOR SOLUTION ORAL DAILY PRN
Status: DISCONTINUED | OUTPATIENT
Start: 2022-06-03 | End: 2022-06-06 | Stop reason: HOSPADM

## 2022-06-03 RX ORDER — SODIUM CHLORIDE 0.9 % (FLUSH) 0.9 %
5-40 SYRINGE (ML) INJECTION EVERY 12 HOURS SCHEDULED
Status: DISCONTINUED | OUTPATIENT
Start: 2022-06-03 | End: 2022-06-06 | Stop reason: HOSPADM

## 2022-06-03 RX ORDER — FLUTICASONE PROPIONATE 50 MCG
2 SPRAY, SUSPENSION (ML) NASAL DAILY
Status: DISCONTINUED | OUTPATIENT
Start: 2022-06-03 | End: 2022-06-06 | Stop reason: HOSPADM

## 2022-06-03 RX ORDER — SODIUM CHLORIDE 0.9 % (FLUSH) 0.9 %
5-40 SYRINGE (ML) INJECTION PRN
Status: DISCONTINUED | OUTPATIENT
Start: 2022-06-03 | End: 2022-06-06 | Stop reason: HOSPADM

## 2022-06-03 RX ORDER — SODIUM CHLORIDE 9 MG/ML
INJECTION, SOLUTION INTRAVENOUS PRN
Status: DISCONTINUED | OUTPATIENT
Start: 2022-06-03 | End: 2022-06-06 | Stop reason: HOSPADM

## 2022-06-03 RX ORDER — NORTRIPTYLINE HYDROCHLORIDE 25 MG/1
100 CAPSULE ORAL NIGHTLY
Status: DISCONTINUED | OUTPATIENT
Start: 2022-06-03 | End: 2022-06-06 | Stop reason: HOSPADM

## 2022-06-03 RX ORDER — DEXTROSE MONOHYDRATE 50 MG/ML
100 INJECTION, SOLUTION INTRAVENOUS PRN
Status: DISCONTINUED | OUTPATIENT
Start: 2022-06-03 | End: 2022-06-06 | Stop reason: HOSPADM

## 2022-06-03 RX ORDER — ASPIRIN 81 MG/1
81 TABLET, CHEWABLE ORAL DAILY
Status: DISCONTINUED | OUTPATIENT
Start: 2022-06-04 | End: 2022-06-04

## 2022-06-03 RX ORDER — INSULIN LISPRO 100 [IU]/ML
0-6 INJECTION, SOLUTION INTRAVENOUS; SUBCUTANEOUS EVERY 4 HOURS
Status: DISCONTINUED | OUTPATIENT
Start: 2022-06-03 | End: 2022-06-05

## 2022-06-03 RX ORDER — ONDANSETRON 4 MG/1
4 TABLET, ORALLY DISINTEGRATING ORAL EVERY 8 HOURS PRN
Status: DISCONTINUED | OUTPATIENT
Start: 2022-06-03 | End: 2022-06-06 | Stop reason: HOSPADM

## 2022-06-03 RX ORDER — PROPRANOLOL HYDROCHLORIDE 60 MG/1
60 TABLET ORAL NIGHTLY
Status: ON HOLD | COMMUNITY
End: 2022-06-06 | Stop reason: HOSPADM

## 2022-06-03 RX ORDER — SODIUM CHLORIDE 9 MG/ML
INJECTION, SOLUTION INTRAVENOUS CONTINUOUS
Status: DISCONTINUED | OUTPATIENT
Start: 2022-06-03 | End: 2022-06-04

## 2022-06-03 RX ORDER — CLOPIDOGREL BISULFATE 75 MG/1
75 TABLET ORAL DAILY
Status: DISCONTINUED | OUTPATIENT
Start: 2022-06-03 | End: 2022-06-04

## 2022-06-03 RX ORDER — NORTRIPTYLINE HYDROCHLORIDE 25 MG/1
100 CAPSULE ORAL NIGHTLY
Status: DISCONTINUED | OUTPATIENT
Start: 2022-06-03 | End: 2022-06-03

## 2022-06-03 RX ORDER — M-VIT,TX,IRON,MINS/CALC/FOLIC 27MG-0.4MG
1 TABLET ORAL DAILY
Status: DISCONTINUED | OUTPATIENT
Start: 2022-06-03 | End: 2022-06-06 | Stop reason: HOSPADM

## 2022-06-03 RX ORDER — PREGABALIN 25 MG/1
50 CAPSULE ORAL 2 TIMES DAILY
Status: DISCONTINUED | OUTPATIENT
Start: 2022-06-03 | End: 2022-06-03

## 2022-06-03 RX ORDER — FAMOTIDINE 20 MG/1
20 TABLET, FILM COATED ORAL 2 TIMES DAILY
Status: DISCONTINUED | OUTPATIENT
Start: 2022-06-03 | End: 2022-06-06 | Stop reason: HOSPADM

## 2022-06-03 RX ORDER — ACETAMINOPHEN 650 MG/1
650 SUPPOSITORY RECTAL EVERY 6 HOURS PRN
Status: DISCONTINUED | OUTPATIENT
Start: 2022-06-03 | End: 2022-06-06 | Stop reason: HOSPADM

## 2022-06-03 RX ORDER — ACETAMINOPHEN 325 MG/1
650 TABLET ORAL EVERY 6 HOURS PRN
Status: DISCONTINUED | OUTPATIENT
Start: 2022-06-03 | End: 2022-06-06 | Stop reason: HOSPADM

## 2022-06-03 RX ORDER — METOPROLOL SUCCINATE 50 MG/1
50 TABLET, EXTENDED RELEASE ORAL DAILY
Status: DISCONTINUED | OUTPATIENT
Start: 2022-06-03 | End: 2022-06-04

## 2022-06-03 RX ORDER — LORAZEPAM 0.5 MG/1
0.5 TABLET ORAL EVERY 6 HOURS PRN
Status: DISCONTINUED | OUTPATIENT
Start: 2022-06-03 | End: 2022-06-06 | Stop reason: HOSPADM

## 2022-06-03 RX ORDER — AMLODIPINE BESYLATE 5 MG/1
10 TABLET ORAL DAILY
Status: DISCONTINUED | OUTPATIENT
Start: 2022-06-03 | End: 2022-06-04

## 2022-06-03 RX ADMIN — CLOPIDOGREL BISULFATE 75 MG: 75 TABLET ORAL at 17:48

## 2022-06-03 RX ADMIN — PERFLUTREN 1.65 MG: 6.52 INJECTION, SUSPENSION INTRAVENOUS at 10:23

## 2022-06-03 RX ADMIN — METOPROLOL SUCCINATE 50 MG: 50 TABLET, EXTENDED RELEASE ORAL at 11:30

## 2022-06-03 RX ADMIN — ACETAMINOPHEN 650 MG: 325 TABLET ORAL at 20:08

## 2022-06-03 RX ADMIN — Medication 10 ML: at 09:32

## 2022-06-03 RX ADMIN — PREGABALIN 100 MG: 75 CAPSULE ORAL at 09:29

## 2022-06-03 RX ADMIN — FAMOTIDINE 20 MG: 20 TABLET ORAL at 20:08

## 2022-06-03 RX ADMIN — NORTRIPTYLINE HYDROCHLORIDE 100 MG: 25 CAPSULE ORAL at 21:48

## 2022-06-03 RX ADMIN — LEVOTHYROXINE SODIUM 225 MCG: 150 TABLET ORAL at 06:32

## 2022-06-03 RX ADMIN — PREGABALIN 200 MG: 75 CAPSULE ORAL at 03:18

## 2022-06-03 RX ADMIN — CHOLECALCIFEROL TAB 125 MCG (5000 UNIT) 5000 UNITS: 125 TAB at 09:29

## 2022-06-03 RX ADMIN — PREGABALIN 200 MG: 75 CAPSULE ORAL at 21:48

## 2022-06-03 RX ADMIN — SODIUM CHLORIDE: 9 INJECTION, SOLUTION INTRAVENOUS at 03:15

## 2022-06-03 RX ADMIN — Medication 1 TABLET: at 09:29

## 2022-06-03 RX ADMIN — NORTRIPTYLINE HYDROCHLORIDE 100 MG: 25 CAPSULE ORAL at 03:18

## 2022-06-03 RX ADMIN — METHOCARBAMOL 500 MG: 500 TABLET ORAL at 20:08

## 2022-06-03 RX ADMIN — ACETAMINOPHEN 650 MG: 325 TABLET ORAL at 09:29

## 2022-06-03 RX ADMIN — Medication 10 ML: at 21:50

## 2022-06-03 RX ADMIN — FAMOTIDINE 20 MG: 20 TABLET ORAL at 12:30

## 2022-06-03 RX ADMIN — IOPAMIDOL 48 ML: 755 INJECTION, SOLUTION INTRAVENOUS at 15:21

## 2022-06-03 ASSESSMENT — PAIN DESCRIPTION - LOCATION: LOCATION: KNEE;LEG;BACK

## 2022-06-03 ASSESSMENT — PAIN SCALES - GENERAL
PAINLEVEL_OUTOF10: 0
PAINLEVEL_OUTOF10: 8
PAINLEVEL_OUTOF10: 7

## 2022-06-03 NOTE — FLOWSHEET NOTE
06/03/22 1325   Encounter Summary   Encounter Overview/Reason  Advance Care Planning   Service Provided For: Patient   Referral/Consult From: Nurse   Support System Children;Family members   Last Encounter  06/03/22  (AD's completed, see ACP note. GB 6/3)   Complexity of Encounter Moderate   Begin Time 1230   End Time  1325   Total Time Calculated 55 min   Advance Care Planning   Type ACP conversation     See ACP note below. Electronically signed by Tylor James on 6/3/2022 at 1:26 PM    Advance Care Planning     Advance Care Planning Inpatient Note  Spiritual Care Department    Today's Date: 6/3/2022  Unit: [facility] 3A NURSING    Received request from IDT Member. Upon review of chart and communication with care team, patient's decision making abilities are not in question. . Patient and Child/Children was/were present in the room during visit. Goals of ACP Conversation:  Discuss advance care planning documents    Health Care Decision Makers:       Primary Decision MakerTandriy Case Child - [phone]    Secondary Decision Maker: Kaushal Donaldson Child - [phone]    Summary:  Verified Documents  Completed [address]  Documented Next of Kin, per patient report    Advance Care Planning Documents (Patient Wishes):  Healthcare Power of /Advance Directive Appointment of Health Care Agent  Living Will/Advance Directive     Assessment:   initiated this visit per spiritual care consult to assess pt's AD needs. Pt engaged easily w/ and confirmed she wanted to complete HPOA and LW documents.  provided documentation, explanation, and witnessing of AD docs. Pt stated her understanding of the documents and was able to state her wishes clearly.  and pt's nurse provided witnessing of documents at this time. Copy of docs placed in soft chart. No other immediate needs were expressed.  Should any further needs arise, please contact spiritual care services. Interventions:  Provided education on documents for clarity and greater understanding  Discussed and provided education on state decision maker hierarchy  Encouraged ongoing ACP conversation with future decision makers and loved ones    Care Preferences Communicated:   No    Outcomes/Plan:  ACP Discussion: Completed  New advance directive completed. Returned original document(s) to patient, as well as copies for distribution to appointed agents  Copy of advance directive given to staff to scan into medical record.   Teach Back Method used to verify the patient's and/or Healthcare Decision Maker's understanding of key information in the advance directive documents    Electronically signed by Jacek Martin on 6/3/2022 at 1:26 PM

## 2022-06-03 NOTE — CONSULTS
P Pulmonary and Critical Care   Consult Note      Reason for Consult: Chest pain   Requesting Physician: Dr Dali Patel    Subjective:   [facility]:                The patient is a 74 y.o. female with significant past medical history of:      Diagnosis Date    Aortic stenosis     Arthritis     Coronary artery disease     Depression     Fatigue     Fibromyalgia     Hyperlipidemia     Hypertension     Hypothyroidism     Pancreatitis     Type II or unspecified type diabetes mellitus without mention of complication, not stated as uncontrolled      The patient presents to the hospital with a chief complaint of chest pain of several hours duration. She has pain in the jaw and radiating into the arms bilaterally. She does have a history of AS. She has no prior pulmonary history. She has not had testing for ELLIE. She does admit to poor sleep quality. She wakes from sleep feeling poorly rested. She admits to snoring with sleep. She denies SOB to me.         Past Surgical History:        Procedure Laterality Date    ANKLE FRACTURE SURGERY      right     SECTION      CHOLECYSTECTOMY, LAPAROSCOPIC  2014    LAPAROSCOPIC CHOLECYSTECTOMY WITH INTRAOPERATIVE    COLONOSCOPY      ENDOSCOPY, COLON, DIAGNOSTIC      HYSTERECTOMY      LYMPH NODE BIOPSY       Current Medications:    Current Facility-Administered Medications: fluticasone (FLONASE) 50 MCG/ACT nasal spray 2 spray, 2 spray, Nasal, Daily  levothyroxine (SYNTHROID) tablet 225 mcg, 225 mcg, Oral, Daily  nortriptyline (PAMELOR) capsule 100 mg, 100 mg, Oral, Nightly  vitamin D3 (CHOLECALCIFEROL) tablet 5,000 Units, 5,000 Units, Oral, Daily  therapeutic multivitamin-minerals 1 tablet, 1 tablet, Oral, Daily  sodium chloride flush 0.9 % injection 5-40 mL, 5-40 mL, IntraVENous, 2 times per day  sodium chloride flush 0.9 % injection 5-40 mL, 5-40 mL, IntraVENous, PRN  0.9 % sodium chloride infusion, , IntraVENous, PRN  ondansetron (ZOFRAN-ODT) disintegrating tablet 4 mg, 4 mg, Oral, Q8H PRN **OR** ondansetron (ZOFRAN) injection 4 mg, 4 mg, IntraVENous, Q6H PRN  acetaminophen (TYLENOL) tablet 650 mg, 650 mg, Oral, Q6H PRN **OR** acetaminophen (TYLENOL) suppository 650 mg, 650 mg, Rectal, Q6H PRN  polyethylene glycol (GLYCOLAX) packet 17 g, 17 g, Oral, Daily PRN  [START ON 6/4/2022] aspirin chewable tablet 81 mg, 81 mg, Oral, Daily  regadenoson (LEXISCAN) injection 0.4 mg, 0.4 mg, IntraVENous, ONCE PRN  0.9 % sodium chloride infusion, , IntraVENous, Continuous  nitroGLYCERIN (NITROSTAT) SL tablet 0.4 mg, 0.4 mg, SubLINGual, Q5 Min PRN  dextrose bolus 10% 125 mL, 125 mL, IntraVENous, PRN **OR** dextrose bolus 10% 250 mL, 250 mL, IntraVENous, PRN  glucagon (rDNA) injection 1 mg, 1 mg, IntraMUSCular, PRN  dextrose 5 % solution, 100 mL/hr, IntraVENous, PRN  insulin lispro (HUMALOG) injection vial 0-6 Units, 0-6 Units, SubCUTAneous, Q4H  hydrALAZINE (APRESOLINE) injection 10 mg, 10 mg, IntraVENous, Q6H PRN  pregabalin (LYRICA) capsule 100 mg, 100 mg, Oral, Daily **AND** pregabalin (LYRICA) capsule 200 mg, 200 mg, Oral, Nightly  metoprolol succinate (TOPROL XL) extended release tablet 50 mg, 50 mg, Oral, Daily  amLODIPine (NORVASC) tablet 10 mg, 10 mg, Oral, Daily  LORazepam (ATIVAN) tablet 0.5 mg, 0.5 mg, Oral, Q6H PRN  methocarbamol (ROBAXIN) tablet 500 mg, 500 mg, Oral, 4x Daily PRN  famotidine (PEPCID) tablet 20 mg, 20 mg, Oral, BID    Allergies   Allergen Reactions    Cephalosporins      Cough and congestion    Dilaudid [Hydromorphone Hcl] Itching     Nervous, unable to sleep   Diallo Fortaz [Ceftazidime]      Cough and congestion    Statins Other (See Comments)     Muscle cramping/weakness       Social History:    TOBACCO:   reports that she quit smoking about 12 years ago. She has never used smokeless tobacco.  ETOH:   reports no history of alcohol use.   Patient currently lives independently  Environmental/chemical exposure: none known Family History:       Problem Relation Age of Onset    Diabetes Mother     Hypertension Mother     Stroke Father     Heart Disease Brother         aortic stenosis     REVIEW OF SYSTEMS:    CONSTITUTIONAL:  negative for fevers, chills, diaphoresis, activity change, appetite change, fatigue, night sweats and unexpected weight change. EYES:  negative for blurred vision, eye discharge, visual disturbance and icterus  HEENT:  negative for hearing loss, tinnitus, ear drainage, sinus pressure, nasal congestion, epistaxis and snoring  RESPIRATORY:  See HPI  CARDIOVASCULAR:  negative for chest pain, palpitations, exertional chest pressure/discomfort, edema, syncope  GASTROINTESTINAL:  negative for nausea, vomiting, diarrhea, constipation, blood in stool and abdominal pain  GENITOURINARY:  negative for frequency, dysuria, urinary incontinence, decreased urine volume, and hematuria  HEMATOLOGIC/LYMPHATIC:  negative for easy bruising, bleeding and lymphadenopathy  ALLERGIC/IMMUNOLOGIC:  negative for recurrent infections, angioedema, anaphylaxis and drug reactions  ENDOCRINE:  negative for weight changes and diabetic symptoms including polyuria, polydipsia and polyphagia  MUSCULOSKELETAL:  negative for  pain, joint swelling, decreased range of motion and muscle weakness  NEUROLOGICAL:  negative for headaches, slurred speech, unilateral weakness  PSYCHIATRIC/BEHAVIORAL: negative for hallucinations, behavioral problems, confusion and agitation.      Objective:   PHYSICAL EXAM:      VITALS:  BP (!) 160/90   Pulse 75   Temp 97.6 °F (36.4 °C) (Oral)   Resp 18   Ht 5' 8\" (1.727 m)   Wt (!) 329 lb 5.9 oz (149.4 kg)   SpO2 96%   BMI 50.08 kg/m²      24HR INTAKE/OUTPUT:      Intake/Output Summary (Last 24 hours) at 6/3/2022 1128  Last data filed at 6/3/2022 0933  Gross per 24 hour   Intake 10 ml   Output 150 ml   Net -140 ml     CONSTITUTIONAL:  awake, alert, cooperative, no apparent distress, and appears stated age  NECK:  Supple, symmetrical, trachea midline, no adenopathy, thyroid symmetric, not enlarged and no tenderness, skin normal  LUNGS:  no increased work of breathing and clear to auscultation. No accessory muscle use  CARDIOVASCULAR: S1 and S2, no edema and no JVD  ABDOMEN:  Obese, normal bowel sounds, non-distended and no masses palpated, and no tenderness to palpation. No hepatospleenomegaly  LYMPHADENOPATHY:  no axillary or supraclavicular adenopathy. No cervical adnenopathy  PSYCHIATRIC: Oriented to person place and time. No obvious depression or anxiety. MUSCULOSKELETAL: No obvious misalignment or effusion of the joints. No clubbing, cyanosis of the digits. SKIN:  normal skin color, texture, turgor and no redness, warmth, or swelling. No palpable nodules    DATA:    Old records have been reviewed    CBC:  Recent Labs     06/02/22 2148   WBC 8.3   RBC 5.31*   HGB 13.3   HCT 41.9      MCV 79.0*   MCH 25.1*   MCHC 31.8   RDW 16.4*      BMP:  Recent Labs     06/02/22 2148 06/03/22  0241    135*   K 5.2* 4.5    98*   CO2 28 28   BUN 19 18   CREATININE 1.0 1.0   CALCIUM 8.8 8.9   GLUCOSE 124* 123*      ABG:  No results for input(s): PHART, MKT1TRR, PO2ART, KAS1XMB, J3PCNAOZ, BEART in the last 72 hours. Procalcitonin  No results for input(s): PROCAL in the last 72 hours. No results found for: BNP  Lab Results   Component Value Date    CKTOTAL 139 08/29/2017    TROPONINI 0.02 (H) 06/03/2022           Radiology Review:  All pertinent images / reports were reviewed as a part of this visit. Assessment:     1. ELLIE  2. Non-pulmonary chest pain      Plan:     I have reviewed laboratories, medical records and images for this visit  CXR is clear  D-dimer is low  PE unlikely  She does likely have ELLIE  Would recommend OPT PSG  I will ask our office to arrange outpatient sleep study and f/u.

## 2022-06-03 NOTE — PROGRESS NOTES
[facility]    6/3/2022 10:36 AM        Name: Cassius Luna . Admitted: 6/2/2022  Primary Care Provider: Ramón John MD (Tel: [phone])      Chief Complaint   Patient presents with    Chest Pain     CP started about 2100 with jaw pain and sweating. pt. arrived by [employer]. pt. pain free. Brief History: Patient is a 73 yo female with hx fibromyalgia, HLD, HTN, hypothyroidism, DM2. She presented to ER with c/ochest and jaw pain associated with shortness of breath, diaphoresis and palpitations. Symptoms come on with exertion and ease with rest. She was pain free by the time she arrived in ER. EKG with nonspecific T wave abnormality. Troponin < 0.01 on presentation, subsequent 0.03. [facility] planned. Subjective:  Presently resting in bed. She is anxious about upcoming procedure, concerned she will need heart surgery. Continues to note intermittent tightness in chest but not as severe as on presentation. Denies shortness of breath at present. Noted to have NSVT on telemetry which is symptomatic (notes palpitations) when it occurs. Denies abdominal pain, nausea.      Reviewed interval ancillary notes    Current Medications  fluticasone (FLONASE) 50 MCG/ACT nasal spray 2 spray, Daily  levothyroxine (SYNTHROID) tablet 225 mcg, Daily  nortriptyline (PAMELOR) capsule 100 mg, Nightly  vitamin D3 (CHOLECALCIFEROL) tablet 5,000 Units, Daily  therapeutic multivitamin-minerals 1 tablet, Daily  sodium chloride flush 0.9 % injection 5-40 mL, 2 times per day  sodium chloride flush 0.9 % injection 5-40 mL, PRN  0.9 % sodium chloride infusion, PRN  ondansetron (ZOFRAN-ODT) disintegrating tablet 4 mg, Q8H PRN   Or  ondansetron (ZOFRAN) injection 4 mg, Q6H PRN  acetaminophen (TYLENOL) tablet 650 mg, Q6H PRN   Or  acetaminophen (TYLENOL) suppository 650 mg, Q6H PRN  polyethylene glycol (GLYCOLAX) packet 17 g, Daily PRN  [START ON 6/4/2022] aspirin chewable tablet 81 mg, Daily  regadenoson (LEXISCAN) injection 0.4 mg, ONCE PRN  0.9 % sodium chloride infusion, Continuous  nitroGLYCERIN (NITROSTAT) SL tablet 0.4 mg, Q5 Min PRN  dextrose bolus 10% 125 mL, PRN   Or  dextrose bolus 10% 250 mL, PRN  glucagon (rDNA) injection 1 mg, PRN  dextrose 5 % solution, PRN  insulin lispro (HUMALOG) injection vial 0-6 Units, Q4H  hydrALAZINE (APRESOLINE) injection 10 mg, Q6H PRN  pregabalin (LYRICA) capsule 100 mg, Daily   And  pregabalin (LYRICA) capsule 200 mg, Nightly  metoprolol succinate (TOPROL XL) extended release tablet 50 mg, Daily        Objective:  BP (!) 160/90   Pulse 75   Temp 97.6 °F (36.4 °C) (Oral)   Resp 18   Ht 5' 8\" (1.727 m)   Wt (!) 329 lb 5.9 oz (149.4 kg)   SpO2 96%   BMI 50.08 kg/m²     Intake/Output Summary (Last 24 hours) at 6/3/2022 1036  Last data filed at 6/3/2022 0933  Gross per 24 hour   Intake 10 ml   Output 150 ml   Net -140 ml      Wt Readings from Last 3 Encounters:   06/03/22 (!) 329 lb 5.9 oz (149.4 kg)   05/27/22 (!) 324 lb 3.2 oz (147.1 kg)   11/24/21 (!) 319 lb 11.2 oz (145 kg)       General appearance:  Appears comfortable  Eyes: Sclera clear. Pupils equal.  ENT: Moist oral mucosa. Trachea midline, no adenopathy. Cardiovascular: Regular rhythm, normal S1, S2. + BHUPINDER. No edema in lower extremities  Respiratory: Not using accessory muscles. Good inspiratory effort. Clear to auscultation bilaterally, no wheeze or crackles. GI: Abdomen soft, no tenderness, not distended, normal bowel sounds  Musculoskeletal: No cyanosis in digits, neck supple  Neurology: CN 2-12 grossly intact. No speech or motor deficits  Psych: Normal affect.  Alert and oriented in time, place and person  Skin: Warm, dry, normal turgor    Labs and Tests:  CBC:   Recent Labs     06/02/22 2148   WBC 8.3   HGB 13.3        BMP:    Recent Labs     06/02/22 2148 06/03/22  0241    135*   K 5.2* 4.5    98* CO2 28 28   BUN 19 18   CREATININE 1.0 1.0   GLUCOSE 124* 123*     Hepatic:   Recent Labs     06/02/22  2148 06/03/22  0241   AST 19 13*   ALT 12 10   BILITOT <0.2 <0.2   ALKPHOS 71 68       CXR 6/2/2022:  No acute cardiopulmonary process    Echo 6/3/2022:  Summary   -Technically difficult and limited exam due to body habitus.   -Normal overall global LV function with ejection fraction estimated at 55%. -Definity contrast administered.   -Abnormal motion of the distal septum and apex.   -Wall motion assessment was limited due to poor endocardial border definition.   -Moderate aortic stenosis with a peak velocity of 3.43m/s and a mean pressure gradient of 31 mmHg. There is mild aortic insufficiency.   -Thickened mitral valve without evidence of significant stenosis or  regurgitation.   -Grade I diastolic dysfunction with normal LV filling pressures. Avg, E/e'=12.3    Problem List  Principal Problem:    Chest pain  Resolved Problems:    * No resolved hospital problems. *       Assessment & Plan:   1. Chest pain / elevated troponin. Troponin <0.01->0.03->0. 02. No acute changes on EKG. Has been seen by cardiology and [facility] planned. 2. NSVT. Patient symptomatic with palpitations. Potassium 4.5, add on magnesium level. Has been started on beta blocker. EP to see. 3. Aortic stenosis. Echo demonstrates moderate AS with mean gradient 31 mmHg. 4. HTN. Elevated on presentation, improved. Continue amlodipine. 5. PAF. Remains in sinus rhythm. Continue beta blocker. Apixaban on hold for [facility]. 6. DM2. A1c pending. Takes metformin at home, held on admission. Continue low dose correction. 7. Fibromyalgia. Continue pregabalin. Resume prn robaxin. 8. Hypothyroidism. Add on TSH level. Continue levothyroxine.       Diet: Diet NPO Exceptions are: Ice Chips, Sips of Water with Meds  Code:Full Code  DVT PPX: scd (resume apixaban when okay with cardiology)      AQUILES Tidwell - CNP   6/3/2022 10:36 AM

## 2022-06-03 NOTE — OP NOTE
[address]  Procedure Note    Procedure: [facility]  Indication: UA    Procedure Details:  Consent Access Bleed R Sedat Start Stop Versed Fentanyl Contrast Fluoro EBL Comp Spec   Yes RRA low [facility] [place] [place] 1453 [id number] 2 100 48 2.2 <20 None None    US guidance used to determine aforementioned artery patency, size (>2mm), anatomic variations and ideal puncture location.  Real-time US utilized concurrent with vascular needle entry into artery. Image(s) permanently recorded and reported in chart.  Independent trained observer pushed medications at my direction. We monitored the patient's level of consciousness and vital   signs/physiologic status throughout the procedure duration (see start and stop times above, as well as medication dosages).     Findings:  Artery Findings/Result   LM Normal   LAD Proximal ectasia, 40% distal   Cx Proximal to mid ectasia   RI N/A   RCA Proximal to mid ectasia, 30% distal   LVEDP NA   LVG NA     Intervention:   None    Post Cath Dx:   Nonobstructive CAD

## 2022-06-03 NOTE — ED PROVIDER NOTES
[facility]        Pt Name: Jasper Jorgensen  MRN: [id number]  [place] 1955  Date of evaluation: 6/2/2022  Provider: Miguel A Colbert PA-C  PCP: Sarthak Fragoso MD  Note Started: 10:58 PM EDT        I have seen and evaluated this patient with my supervising physician Emir Kline MD.    [address] [place] [address]       Chief Complaint   Patient presents with    Chest Pain     CP started about 2100 with jaw pain and sweating. pt. arrived by [employer]. pt. pain free. HISTORY OF PRESENT ILLNESS   (Location, Timing/Onset, Context/Setting, Quality, Duration, Modifying Factors, Severity, Associated Signs and Symptoms)  Note limiting factors. Chief Complaint: Chest pain    Jasper Jorgensen is a 74 y.o. female who presents to the emergency department today for evaluation for chest pain. The patient has a history of coronary artery disease, hypertension, hyperlipidemia. Patient has a history of paroxysmal atrial fibrillation she has had an ablation for this, but continues to take Eliquis, last taken around 9 PM tonight. The patient states that intermittently for the past couple of days when she overexerts herself she states that she will start with a chest tightness, with radiation towards her jaw, as well as diaphoresis. She states that when she will sit down and rest she stated the symptoms will resolve. She states that last night she had an episode which lasted longer, was more intense, she did have associated nausea. Patient states that around 9 PM tonight she states that she had yet another episode, although she felt that this episode radiated down both of her arms. The patient otherwise just continue to report chest tightness into the jaw but denies any pain into the back. When she arrives to the emergency room, she states that she has not received any medications and she states that she is asymptomatic at this time. Patient denies any lower leg pain or swelling. Patient denies any recent illnesses including fever, cough, vomiting or diarrhea. Patient otherwise has no urinary symptoms, no other complaints. Nursing Notes were all reviewed and agreed with or any disagreements were addressed in the HPI. REVIEW OF SYSTEMS    (2-9 systems for level 4, 10 or more for level 5)     Review of Systems   Constitutional: Positive for diaphoresis. Negative for activity change, appetite change, chills and fever. HENT: Negative for congestion and rhinorrhea. Respiratory: Positive for shortness of breath. Negative for cough. Cardiovascular: Positive for chest pain. Gastrointestinal: Positive for nausea. Negative for abdominal pain, diarrhea and vomiting. Genitourinary: Negative for difficulty urinating, dysuria and hematuria. Positives and Pertinent negatives as per HPI. Except as noted above in the ROS, all other systems were reviewed and negative.        PAST MEDICAL HISTORY     Past Medical History:   Diagnosis Date    Aortic stenosis     Arthritis     Coronary artery disease     Depression     Fatigue     Fibromyalgia     Hyperlipidemia     Hypertension     Hypothyroidism     Pancreatitis     Type II or unspecified type diabetes mellitus without mention of complication, not stated as uncontrolled          SURGICAL HISTORY     Past Surgical History:   Procedure Laterality Date    ANKLE FRACTURE SURGERY      right     SECTION      CHOLECYSTECTOMY, LAPAROSCOPIC  2014    LAPAROSCOPIC CHOLECYSTECTOMY WITH INTRAOPERATIVE    COLONOSCOPY      ENDOSCOPY, COLON, DIAGNOSTIC      HYSTERECTOMY      LYMPH NODE BIOPSY           CURRENTMEDICATIONS       Previous Medications    ACETAMINOPHEN (TYLENOL) 325 MG TABLET    Take 650 mg by mouth every 6 hours as needed for Pain    AMLODIPINE (NORVASC) 10 MG TABLET    Take 0.5 tablets by mouth daily    BUPROPION (WELLBUTRIN XL) 300 MG EXTENDED RELEASE TABLET    TAKE 1 TABLET BY MOUTH EVERY MORNING    BUSPIRONE (BUSPAR) 10 MG TABLET    Take 10 mg by mouth 2 times daily Unsure of mg amount    CALCIUM-MAGNESIUM-VITAMIN D 185- MG-MG-UNIT CAPS    Take 1 capsule by mouth daily    COENZYME Q10 (COQ-10 PO)    Take by mouth daily    ELIQUIS 5 MG TABS TABLET    Take 1 tablet by mouth 2 times daily    ESCITALOPRAM OXALATE (LEXAPRO PO)    Take by mouth nightly Unsure of mg amount    FAMOTIDINE (PEPCID) 20 MG TABLET    Take 1 tablet by mouth 2 times daily    FLUTICASONE (FLONASE) 50 MCG/ACT NASAL SPRAY    2 sprays by Nasal route daily    HYDROCODONE-ACETAMINOPHEN (NORCO) 5-325 MG PER TABLET    Take 1 tablet by mouth every 6 hours as needed for Pain. LEVOTHYROXINE (SYNTHROID) 25 MCG TABLET    TAKE ONE TABLET BY MOUTH DAILY ALONG WITH 200 MCG TABLET (225 MCG TOTAL)    LORAZEPAM (ATIVAN) 0.5 MG TABLET    Take 0.5 mg by mouth every 6 hours as needed for Anxiety. METFORMIN (GLUCOPHAGE) 500 MG TABLET    Take 1 tablet by mouth 2 times daily (with meals)    METHOCARBAMOL (ROBAXIN) 500 MG TABLET    TAKE 1 TABLET BY MOUTH 4 TIMES DAILY AS NEEDED (PAIN)    MISC. DEVICES (WALKER) MISC    Use with walking or standing at all times to prevent falls. NORTRIPTYLINE (PAMELOR) 75 MG CAPSULE    1 CAPSULE EVERY NIGHT    OMEGA-3 FATTY ACIDS (FISH OIL) 1000 MG CAPS    Take 1,000 mg by mouth daily. OMEPRAZOLE (PRILOSEC) 40 MG DELAYED RELEASE CAPSULE    TAKE ONE CAPSULE BY MOUTH ONE TIME DAILY    PREGABALIN (LYRICA) 50 MG CAPSULE    Take 1 capsule by mouth 2 times daily for 31 days. Clovis Hilt PROMETHAZINE (PHENERGAN) 25 MG TABLET    Take 1 tablet by mouth every 6 hours as needed for Nausea    THERAPEUTIC MULTIVITAMIN-MINERALS (THERAGRAN-M) TABLET    Take 1 tablet by mouth daily.     VITAMIN D (CHOLECALCIFEROL) 5000 UNITS CAPS CAPSULE    Take 5,000 Units by mouth daily         ALLERGIES     Cephalosporins, Dilaudid [hydromorphone hcl], Fortaz [ceftazidime], and Statins    FAMILYHISTORY       Family History   Problem Relation Age of Onset    Diabetes Mother     Hypertension Mother     Stroke Father     Heart Disease Brother         aortic stenosis          SOCIAL HISTORY       Social History     Tobacco Use    Smoking status: Former Smoker     Quit date: 2009     Years since quittin.5    Smokeless tobacco: Never Used    Tobacco comment: quit 2008   Vaping Use    Vaping Use: Never used   Substance Use Topics    Alcohol use: No    Drug use: No       SCREENINGS    [place] Coma Scale  Eye Opening: Spontaneous  Best Verbal Response: Oriented  Best Motor Response: Obeys commands  Wily Coma Scale Score: 15        PHYSICAL EXAM    (up to 7 for level 4, 8 or more for level 5)     ED Triage Vitals   BP Temp Temp Source Heart Rate Resp SpO2 Height Weight   22 2234 22 2226 22 22222 -- --   (!) 169/75 98.2 °F (36.8 °C) Oral (!) 101 18 96 %         Physical Exam  Vitals and nursing note reviewed. Constitutional:       Appearance: She is well-developed. She is not diaphoretic. HENT:      Head: Normocephalic and atraumatic. Right Ear: External ear normal.      Left Ear: External ear normal.      Nose: Nose normal.   Eyes:      General:         Right eye: No discharge. Left eye: No discharge. Neck:      Trachea: No tracheal deviation. Cardiovascular:      Rate and Rhythm: Normal rate and regular rhythm. Heart sounds: No murmur heard. Pulmonary:      Effort: Pulmonary effort is normal. No respiratory distress. Breath sounds: Normal breath sounds. No wheezing or rales. Abdominal:      General: Bowel sounds are normal. There is no distension. Palpations: Abdomen is soft. Tenderness: There is no abdominal tenderness. There is no guarding. Musculoskeletal:         General: Normal range of motion. Cervical back: Normal range of motion and neck supple.    Skin: General: Skin is warm and dry. Neurological:      Mental Status: She is alert and oriented to person, place, and time. Psychiatric:         Behavior: Behavior normal.         DIAGNOSTIC RESULTS   LABS:    Labs Reviewed   CBC WITH AUTO DIFFERENTIAL - Abnormal; Notable for the following components:       Result Value    RBC 5.31 (*)     MCV 79.0 (*)     MCH 25.1 (*)     RDW 16.4 (*)     All other components within normal limits   COMPREHENSIVE METABOLIC PANEL - Abnormal; Notable for the following components:    Potassium 5.2 (*)     Glucose 124 (*)     GFR Non- 55 (*)     All other components within normal limits   BRAIN NATRIURETIC PEPTIDE - Abnormal; Notable for the following components:    Pro- (*)     All other components within normal limits   TROPONIN   D-DIMER, QUANTITATIVE       When ordered only abnormal lab results are displayed. All other labs were within normal range or not returned as of this dictation. EKG: When ordered, EKG's are interpreted by the Emergency Department Physician in the absence of a cardiologist.  Please see their note for interpretation of EKG. RADIOLOGY:   Non-plain film images such as CT, Ultrasound and MRI are read by the radiologist. Plain radiographic images are visualized and preliminarily interpreted by the ED Provider with the below findings:        Interpretation per the Radiologist below, if available at the time of this note:    XR CHEST PORTABLE   Final Result   No acute cardiopulmonary process           No results found.         PROCEDURES   Unless otherwise noted below, none     Procedures    CRITICAL CARE TIME       CONSULTS:  None      EMERGENCY DEPARTMENT COURSE and DIFFERENTIAL DIAGNOSIS/MDM:   Vitals:    Vitals:    06/02/22 2226 06/02/22 2234 06/03/22 0000 06/03/22 0130   BP:  (!) 169/75 (!) 127/100 (!) 156/84   Pulse: (!) 101  82 75   Resp: 18  12 17   Temp: 98.2 °F (36.8 °C)      TempSrc: Oral      SpO2: 96%  96% 96%       Patient was given the following medications:  Medications   aspirin chewable tablet 324 mg (324 mg Oral Given 6/2/22 3470)         Is this patient to be included in the SEP-1 Core Measure due to severe sepsis or septic shock? No   Exclusion criteria - the patient is NOT to be included for SEP-1 Core Measure due to: Infection is not suspected    Briefly, this is a 68-year-old female who presents emergency department today for evaluation for chest pain. The patient states that she has been experiencing intermittent pain in her chest with radiation towards her jaw with associated diaphoresis. States that these episodes occur if she overexerts herself. Has been worse last night into today. She is asymptomatic when she arrives    Physical exam is unremarkable    EKG is documented by my attending please see his note for further details. CBC shows no evidence of leukocytosis or anemia. CMP is unremarkable. Troponin is negative. D-dimer is normal.  BNP is normal.  Chest x-ray is negative    She has remained asymptomatic while in the emergency room, however I feel that she will need to be admitted for further ACS rule out, she was given aspirin here, hospitalist has been paged for admission patient is updated, agreeable with plan, stable for admission      FINAL IMPRESSION      1. Chest pain, unspecified type          DISPOSITION/PLAN   DISPOSITION Admitted 06/03/2022 12:15:50 AM      PATIENT REFERRED TO:  No follow-up provider specified.     DISCHARGE MEDICATIONS:  New Prescriptions    No medications on file       DISCONTINUED MEDICATIONS:  Discontinued Medications    No medications on file              (Please note that portions of this note were completed with a voice recognition program.  Efforts were made to edit the dictations but occasionally words are mis-transcribed.)    Danni Suresh PA-C (electronically signed)            Danni Suresh PA-C  06/03/22 8721

## 2022-06-03 NOTE — PLAN OF CARE
Problem: Discharge Planning  Goal: Discharge to home or other facility with appropriate resources  Outcome: Progressing  Flowsheets (Taken 6/3/2022 0240)  Discharge to home or other facility with appropriate resources:   Identify barriers to discharge with patient and caregiver   Identify discharge learning needs (meds, wound care, etc)   Refer to discharge planning if patient needs post-hospital services based on physician order or complex needs related to functional status, cognitive ability or social support system   Arrange for needed discharge resources and transportation as appropriate   Arrange for interpreters to assist at discharge as needed     Problem: Pain  Goal: Verbalizes/displays adequate comfort level or baseline comfort level  Outcome: Progressing     Problem: Skin/Tissue Integrity  Goal: Absence of new skin breakdown  Description: 1. Monitor for areas of redness and/or skin breakdown  2. Assess vascular access sites hourly  3. Every 4-6 hours minimum:  Change oxygen saturation probe site  4. Every 4-6 hours:  If on nasal continuous positive airway pressure, respiratory therapy assess nares and determine need for appliance change or resting period.   Outcome: Progressing     Problem: Safety - Adult  Goal: Free from fall injury  Outcome: Progressing     Problem: ABCDS Injury Assessment  Goal: Absence of physical injury  Outcome: Progressing

## 2022-06-03 NOTE — PRE SEDATION
Brief Pre-Op Note/Sedation Assessment      Lani Rojas  1955  [id number]  3:00 PM    Planned Procedure: Cardiac Catheterization Procedure  Post Procedure Plan: Return to same level of care  Consent: I have discussed with the patient and/or the patient representative the indication, alternatives, and the possible risks and/or complications of the planned procedure and the anesthesia methods. The patient and/or patient representative appear to understand and agree to proceed. Chief Complaint:   Chest Pain/Pressure  Anginal Equivalent  Dyspnea on Exertion  Dyspnea    Indications for Cath Procedure:   Presentation:  New Onset Angina <= 2 months, Worsening Angina and Suspected CAD   Anginal Classification within 2 weeks:  CCS III - Symptoms with everyday living activities, i.e., moderate limitation   Angina Symptoms Assessment:  Typical Chest Pain   Heart Failure Class within last 2 weeks:  No symptoms   Cardiovascular Instability:  Yes    Prior Ischemic Workup/Eval:   Pre-Procedural Medications: Yes: Aspirin, Beta Blockers and Ca Channel Blockers   Stress Test Completed? No    Does Patient need surgery?  Cath Valve Surgery:  No    Pre-Procedure Medical History:   Vital Signs:  /84   Pulse 99   Temp 98.1 °F (36.7 °C) (Oral)   Resp 16   Ht 5' 8\" (1.727 m)   Wt (!) 329 lb 5.9 oz (149.4 kg)   SpO2 93%   BMI 50.08 kg/m²    Allergies:  Reviewed in EMR   Medications:  Reviewed in EMR   Past Medical History:  Reviewed in EMR   Surgical History:  Reviewed in EMR    Pre-Sedation:   Pre-Sedation Documentation and Exam = I have assessed the patient and reviewed the H&P on the chart.    Prior History of Anesthesia Complications = none   Modified Mallampati = II (soft palate, uvula, fauces visible)   ASA Classification = Class 2 - A normal healthy patient with mild systemic disease    Nuha Scale:   Activity:  2 - Able to move 4 extremities voluntarily on command   Respiration:  2 - Able to breathe deeply and cough freely   Circulation:  2 - BP+/- 20mmHg of normal   Consciousness:  2 - Fully awake   Oxygen Saturation (color):  2 - Able to maintain oxygen saturation >92% on room air    Sedation/Anesthesia Plan:  Guard the patient's safety and welfare. Minimize physical discomfort and pain. Minimize negative psychological responses to treatment by providing sedation and analgesia and maximize the potential amnesia. Patient to meet pre-procedure discharge plan.     Medication Planned:  Midazolam intravenously and fentanyl intravenously    Patient is an appropriate candidate for plan of sedation:   Yes      Electronically signed by Priscilla Salazar MD on 6/3/2022 at 3:00 PM

## 2022-06-03 NOTE — ED PROVIDER NOTES
[facility]    Physician Attestation    Pt Name: Jodi Appiah  MRN: [id number]  [place] 1955  Date of evaluation: 6/2/22        Physician Note:    I havepersonally performed and/or participated in the history, exam and medical decision making and agree with all pertinent clinical information. I have also reviewed and agree with the past medical, family and social historyunless otherwise noted. I have personally performed a face to face diagnostic evaluation onthis patient. I have reviewed the mid-levels findings and agree. History: Had recurring episodes of chest pressure that radiates into the jaw tonight it also radiated to both arms with shortness of breath she said several episodes yesterday and again today usually at rest also causes diaphoresis patient has a history of paroxysmal atrial fibrillation upper lipidemia diabetes mellitus she states the pain was more intense today      REVIEW OF SYSTEMS    Constitutional:  Denies fever, chills, or weakness   Eyes:  Denies vision changes  HENT:  Denies sore throat or neck pain   Respiratory:  Denies cough some  shortness of breath   Cardiovascular:  chest pain  GI:  Denies abdominal pain, nausea, vomiting, or diarrhea   Musculoskeletal:  Denies back pain   Skin: no rash or vesicles   Neurologic:  no headache weakness focal    Lymphatic:  no swollen  nodes   Psychiatric: no si or hs thoughts     All systems negative except as marked. General Appearance:  Alert, cooperative, no distress, appears stated age. Head:  Normocephalic, without obvious abnormality, atraumatic. Eyes:  conjunctiva/corneas clear, EOM's intact. Sclera anicteric. ENT: Mucous membranes moist.   Neck: Supple, symmetrical, trachea midline, no adenopathy. No jugular venous distention. Lungs:   No Respiratory Distress. no rales  rhonchi rub   Chest Wall:  Nontender  no deformity   Heart:  Rsr no murmer gallop    Abdomen:   Soft nontender no organomegally    Extremities:  Full range of motion. no deformity   Pulses: Equal  upper and lower    Skin:  No rashes or lesions to exposed skin. Neurologic: Alert and oriented X 3. Motor grossly normal.  Speech clear. C   EKG Interpretation    Interpreted by emergency department physician    Rhythm: normal sinus   Rate: normal  Axis: normal  Ectopy: pvc  Conduction: normal  ST Segments: no acute change  T Waves: no acute change  Q Waves: none    Clinical Impression: Normal sinus rhythm    Pablo Jon MD  Patient is admitted for further evaluation      1. Chest pain, unspecified type          DISPOSITION/PLAN  PATIENT REFERRED TO:  No follow-up provider specified. DISCHARGE MEDICATIONS:  New Prescriptions    No medications on file         Is this patient to be included in the SEP-1 Core Measure due to severe sepsis or septic shock? No   Exclusion criteria - the patient is NOT to be included for SEP-1 Core Measure due to:   Infection is not suspected      MD Pablo Parish MD  06/02/22 6113

## 2022-06-03 NOTE — CARE COORDINATION
Discharge Planning  Patient admitted as Observation with an anticipated short hospitalization length of stay. Chart reviewed and it appears that patient has minimal needs for discharge at this time. Discussed with patients nurse and requested that case management be notified if discharge needs are identified. Case management will continue to follow progress and update discharge plan as needed.

## 2022-06-03 NOTE — CONSULTS
[address]   Electrophysiology Consultation   Date: 6/3/2022  Reason for Consultation: NSVT   Consult Requesting Physician: Kerri Mitchell MD   Chief Complaint   Patient presents with    Chest Pain     CP started about 2100 with jaw pain and sweating. pt. arrived by [place]. pt. pain free. CC: Chest pain   HPI: Tatiana Garcia is a 74 y.o. female who has presented to the hospital with chest discomfort. She states that for the past 10 days she has noticed substernal chest discomfort with radiation to the jaw lasting several minutes. She was not feeling well. Last night she had severe chest discomfort with radiation to the jaw and both arms. She has had occasional palpitation. She has history of paroxysmal atrial fibrillation in the past.  She presented to the hospital and by the time she was in the emergency room she was chest pain-free. She has undergone cardiac catheterization which showed ectatic coronary artery disease with no obvious obstructive lesion. No intervention has been done. On telemetry she has had nonsustained VT and EP has been consulted. At the time of my examination patient reports no chest pain or shortness of breath. She is following with Dr. Derrick Cross at [facility] office. She is morbidly obese. Has history of snoring at night. Has not been evaluated for obstructive sleep apnea. She denies recent history of syncope. She has had paroxysmal atrial fibrillation and she is on anticoagulation with Eliquis. Assessment:   -NSVT, PVCs  -NSTEMI  -CAD, ectatic coronary arteries  - Morbid obesity: Body mass index is 50.08 kg/m². - Suspected ELLIE     Plan:   -Telemetry reviewed. Patient has had ST elevation on the lead on telemetry at the time of PVCs and NSVT (4-5 beats). -She has had chest pain and elevated troponin, ?  NSTEMI  -EF is normal.    -She has clearly abnormal coronary anatomy with ectatic coronaries and multiple luminal narrowing throughout the arteries. - Discussed with interventional cardiology. ? Spasm vs transient ischemia   - Needs aggressive medical therapy with BB, statin, DAPT for NSTEMI . Will defer to general cardiology/interventional cardiology for further therapy. - Continue telemetry. - Discussed diagnostic options for arrhythmia including Holter, ILR or invasive EP study. No clear indication for EP study at this time. Consider Holter if recurrent PVC/NSVT. - She was mildly hyperkalemic. Correct electrolytes. - Plan for Holter monitoring at discharge. If sustained arrhythmia, can consider further invasive testing. Discussed with nursing staff. Discussed with interventional     Active Hospital Problems    Diagnosis Date Noted    Chest pain [R07.9] 06/03/2022     Priority: Medium    ELLIE (obstructive sleep apnea) [G47.33]      Priority: Medium       Diagnostic studies:     ECG: sinus rhythm, NSTT changes. Echo: 6/2022:    -Technically difficult and limited exam due to body habitus.   -Normal overall global LV function with ejection fraction estimated at 55%. -Definity contrast administered.   -Abnormal motion of the distal septum and apex.   -Wall motion assessment was limited due to poor endocardial border   definition.   -Moderate aortic stenosis with a peak velocity of 3.43m/s and a mean   pressure gradient of 31 mmHg. There is mild aortic insufficiency.   -Thickened mitral valve without evidence of significant stenosis or   regurgitation.   -Grade I diastolic dysfunction with normal LV filling pressures. Avg,   E/e'=12.3    I independently reviewed the cardiac diagnostic studies, ECG and relevant imaging studies.      Lab Results   Component Value Date    LVEF 68 05/16/2019     Lab Results   Component Value Date    TSHFT4 0.74 06/22/2021    .00 (H) 12/31/2020       Physical Examination:  Vitals:    06/03/22 1711   BP: 116/69   Pulse: 76   Resp:    Temp:    SpO2:       In: 10 [I.V.:10]  Out: 400    Wt Readings from Last 3 Encounters:   22 (!) 329 lb 5.9 oz (149.4 kg)   22 (!) 324 lb 3.2 oz (147.1 kg)   21 (!) 319 lb 11.2 oz (145 kg)     Temp  Av.9 °F (36.6 °C)  Min: 97.6 °F (36.4 °C)  Max: 98.2 °F (36.8 °C)  Pulse  Av.9  Min: 66  Max: 101  BP  Min: 95/57  Max: 178/82  SpO2  Av.2 %  Min: 93 %  Max: 97 %    Intake/Output Summary (Last 24 hours) at 6/3/2022 1728  Last data filed at 6/3/2022 1337  Gross per 24 hour   Intake 10 ml   Output 400 ml   Net -390 ml         I independently reviewed all cardiac tracing from cardiac telemetry. · Constitutional: Oriented. No distress. · Head: Normocephalic and atraumatic. · Mouth/Throat: Oropharynx is clear and moist.   · Eyes: Conjunctivae normal. EOM are normal.   · Neck: Neck supple. No JVD present. · Cardiovascular: Normal rate, regular rhythm, S1&S2. · Pulmonary/Chest: Bilateral respiratory sounds. No rhonchi. · Abdominal: Soft. No tenderness. + obese   · Musculoskeletal: No tenderness. No edema    · Lymphadenopathy: Has no cervical adenopathy. · Neurological: Alert and oriented. Follows command, No Gross deficit   · Skin: Skin is warm, No rash noted.    · Psychiatric: Has a normal behavior       Scheduled Meds:   fluticasone  2 spray Nasal Daily    levothyroxine  225 mcg Oral Daily    nortriptyline  100 mg Oral Nightly    vitamin D3  5,000 Units Oral Daily    therapeutic multivitamin-minerals  1 tablet Oral Daily    sodium chloride flush  5-40 mL IntraVENous 2 times per day    [START ON 2022] aspirin  81 mg Oral Daily    insulin lispro  0-6 Units SubCUTAneous Q4H    pregabalin  100 mg Oral Daily    And    pregabalin  200 mg Oral Nightly    metoprolol succinate  50 mg Oral Daily    amLODIPine  10 mg Oral Daily    famotidine  20 mg Oral BID     Continuous Infusions:   sodium chloride      sodium chloride 75 mL/hr at 22 0315    dextrose       PRN Meds:.sodium chloride flush, sodium chloride, ondansetron **OR** ondansetron, acetaminophen **OR** acetaminophen, polyethylene glycol, regadenoson, nitroGLYCERIN, dextrose bolus **OR** dextrose bolus, glucagon (rDNA), dextrose, hydrALAZINE, LORazepam, methocarbamol     Review of System:  [x] Full ROS obtained and negative except as mentioned in HPI    Prior to Admission medications    Medication Sig Start Date End Date Taking? Authorizing Provider   propranolol (INDERAL) 60 MG tablet Take 60 mg by mouth nightly   Yes Historical Provider, MD   nortriptyline (PAMELOR) 75 MG capsule 100 mg nightly 2 50 mg caps at bed time per pt 4/4/22   Historical Provider, MD   ELIQUIS 5 MG TABS tablet Take 1 tablet by mouth 2 times daily 6/16/21   Cyn Tobar MD   metFORMIN (GLUCOPHAGE) 500 MG tablet Take 1 tablet by mouth 2 times daily (with meals) 2/25/21   AQUILES Saleh CNP   amLODIPine (NORVASC) 10 MG tablet Take 0.5 tablets by mouth daily  Patient not taking: Reported on 6/3/2022 2/25/21   AQUILES Saleh CNP   LORazepam (ATIVAN) 0.5 MG tablet Take 0.5 mg by mouth every 6 hours as needed for Anxiety. Historical Provider, MD   HYDROcodone-acetaminophen (NORCO) 5-325 MG per tablet Take 1 tablet by mouth every 6 hours as needed for Pain. Historical Provider, MD   busPIRone (BUSPAR) 10 MG tablet Take 10 mg by mouth 2 times daily Unsure of mg amount  Patient not taking: Reported on 6/3/2022    Historical Provider, MD   Escitalopram Oxalate (LEXAPRO PO) Take by mouth nightly Unsure of mg amount    Historical Provider, MD   Misc. Devices [facility]) MISC Use with walking or standing at all times to prevent falls. 12/31/20   MAREN Kellogg   pregabalin (LYRICA) 50 MG capsule Take 1 capsule by mouth 2 times daily for 31 days. .  Patient taking differently: Take 50 mg by mouth 3 times daily.  Once during the day and two at night per pt 9/4/18 12/31/20  AQUILES Pineda CNP   levothyroxine (SYNTHROID) 25 MCG tablet TAKE ONE TABLET BY MOUTH DAILY ALONG WITH 200 MCG TABLET (225 MCG TOTAL)  Patient taking differently: 50 mcg Daily Patient states she takes 150 mcgs currently 8/1/18   AQUILES Regan CNP   buPROPion (WELLBUTRIN XL) 300 MG extended release tablet TAKE 1 TABLET BY MOUTH EVERY MORNING  Patient not taking: Reported on 6/3/2022 5/7/18   Kolby Tsang MD   methocarbamol (ROBAXIN) 500 MG tablet TAKE 1 TABLET BY MOUTH 4 TIMES DAILY AS NEEDED (PAIN) 4/26/18   Kolby Tsang MD   omeprazole (PRILOSEC) 40 MG delayed release capsule TAKE ONE CAPSULE BY MOUTH ONE TIME DAILY  Patient not taking: Reported on 6/3/2022 4/16/18   Kolby Tsang MD   famotidine (PEPCID) 20 MG tablet Take 1 tablet by mouth 2 times daily  Patient taking differently: Take 20 mg by mouth 2 times daily as needed  12/21/17   Kolby Tsang MD   fluticasone [facility]) 50 MCG/ACT nasal spray 2 sprays by Nasal route daily 11/24/17   Kolby Tsang MD   promethazine (PHENERGAN) 25 MG tablet Take 1 tablet by mouth every 6 hours as needed for Nausea  Patient not taking: Reported on 6/3/2022 9/26/17   Kolby Tsang MD   vitamin D (CHOLECALCIFEROL) 5000 units CAPS capsule Take 5,000 Units by mouth daily    Historical Provider, MD   Coenzyme Q10 (COQ-10 PO) Take by mouth daily    Historical Provider, MD   Calcium-Magnesium-Vitamin D 185- MG-MG-UNIT CAPS Take 1 capsule by mouth daily    Historical Provider, MD   acetaminophen (TYLENOL) 325 MG tablet Take 650 mg by mouth every 6 hours as needed for Pain    Historical Provider, MD   therapeutic multivitamin-minerals (THERAGRAN-M) tablet Take 1 tablet by mouth daily. Historical Provider, MD   Omega-3 Fatty Acids (FISH OIL) 1000 MG CAPS Take 1,000 mg by mouth daily.     Historical Provider, MD       Past Medical History:   Diagnosis Date    Aortic stenosis     Arthritis     Coronary artery disease     Depression     Fatigue     Fibromyalgia     Hyperlipidemia     Hypertension     Hypothyroidism     Pancreatitis     Type II or unspecified type diabetes mellitus without mention of complication, not stated as uncontrolled         Past Surgical History:   Procedure Laterality Date    ANKLE FRACTURE SURGERY      right     SECTION      CHOLECYSTECTOMY, LAPAROSCOPIC  2014    LAPAROSCOPIC CHOLECYSTECTOMY WITH INTRAOPERATIVE    COLONOSCOPY      ENDOSCOPY, COLON, DIAGNOSTIC      HYSTERECTOMY      LYMPH NODE BIOPSY         Allergies   Allergen Reactions    Cephalosporins      Cough and congestion    Dilaudid [Hydromorphone Hcl] Itching     Nervous, unable to sleep   Christiana Pickens Preet Outhouse [Ceftazidime]      Cough and congestion    Statins Other (See Comments)     Muscle cramping/weakness       Social History:  Reviewed. reports that she quit smoking about 12 years ago. She has never used smokeless tobacco. She reports that she does not drink alcohol and does not use drugs. Family History:  Reviewed. Reviewed. No family history of SCD. Relevant and available labs, and cardiovascular diagnostics reviewed. Reviewed. Recent Labs     22  0241    135*   K 5.2* 4.5    98*   CO2 28 28   BUN 19 18   CREATININE 1.0 1.0     Recent Labs     22   WBC 8.3   HGB 13.3   HCT 41.9   MCV 79.0*        Estimated Creatinine Clearance: 83 mL/min (based on SCr of 1 mg/dL). No results found for: BNP    I independently reviewed all cardiac tracing from cardiac telemetry. I independently reviewed relevant and available cardiac diagnostic tests ECG, CXR, Echo, Stress test, Device interrogation, Holter, CT scan. Complex medical condition with multiple medical problems affecting prognosis and outcome of EP interventions  Severe exacerbation of underlying medical condition requiring hospitalization and at risk of decompensation. All questions and concerns were addressed to the patient/family.  Alternatives to my treatment were discussed. I have discussed the above stated plan and the patient verbalized understanding and agreed with the plan. NOTE: This report was transcribed using voice recognition software. Every effort was made to ensure accuracy, however, inadvertent computerized transcription errors may be present.      Michael Duran MD, MPH  [facility]   Office: [phone]  Fax: [phone]

## 2022-06-03 NOTE — PROGRESS NOTES
Patient complaining of chest tightness and discomfort. 3 min run of possible V tach, patient A&O x4, no s/s of resp distress. Patient currently laying in bed, awaiting EKG. Appears to be back in a NSR at this time. RN made aware.

## 2022-06-03 NOTE — CONSULTS
[facility]  [phone]      Chief Complaint   Patient presents with    Chest Pain     CP started about 2100 with jaw pain and sweating. pt. arrived by [place]. pt. pain free. Reason for consult:  Chest pain    ASSESSMENT AND PLAN:    1. Unstable angina  2. Aortic stenosis, possibly severe  3. VT  4. Paroxysmal AF, currently in NSR      Plan  1. STAT echo done, shows significant AS, at least moderate, possible severe with low-flow low-gradient  2. Start beta blocker to suppress VT and treat HTN  3. Will cath patient urgently today to determine culprit for symptoms, if it is a coronary lesion, severe AS, or both  4. Pending results of cath patient may need SAVR versus TAVR, versus PCI, versus CABG + SAVR  5. HOLD apixaban until after cath completed  6. Further recs pending cath results  7. If AS is not severe and if there is no acute coronary disease, will consult EP for further evaluation  8. Closely monitor telemetry, may need to start amiodarone if additional VT. Critical Care Procedure Note     Total critical care time: 35 minutes    Due to a high probability of clinically significant, life threatening deterioration, the patient required my highest level of preparedness to intervene emergently and I personally spent this critical care time directly and personally managing the patient. This critical care time included obtaining a history; examining the patient; pulse oximetry; ordering and review of studies; arranging urgent treatment with development of a management plan; evaluation of patient's response to treatment; frequent reassessment; and, discussions with other providers. This critical care time was performed to assess and manage the high probability of imminent, life-threatening deterioration that could result in multi-organ failure. It was exclusive of separately billable procedures and treating other patients and teaching time.         Please see MDM section and the rest of the note for further information on patient assessment and treatment. History of Present Illness:  Cassius Luna is a 74 y.o. patient who presented to the hospital with complaints of chest pain. I have been asked to provide consultation regarding further management and testing. Patient follows with Dr. Katherine Shah at [facility] for AF and AS that is probably congenital.  Several relatives have congenital AS. She had a cardioversion 2021 and has been on Eliquis. Last dose of Eliquis was 9 pm last night. She presents with 1 week of worsening exertional typical angina with substernal chest pressure and radiation of pain down her left arm. She has occasional 'spasms' where she feels a similar pain while sitting and doing nothing. On telemetry this am she had short runs of nonsustained VT in rapid succession, at about 180 bpm, which were highly symptomatic and reproduced her symptoms. She has not fainted. She has not been dizzy. No leg swelling. Past Medical History:   has a past medical history of Aortic stenosis, Arthritis, Coronary artery disease, Depression, Fatigue, Fibromyalgia, Hyperlipidemia, Hypertension, Hypothyroidism, Pancreatitis, and Type II or unspecified type diabetes mellitus without mention of complication, not stated as uncontrolled. Surgical History:   has a past surgical history that includes  section; Hysterectomy; Ankle fracture surgery (); lymph node biopsy (); Colonoscopy; Endoscopy, colon, diagnostic; and Cholecystectomy, laparoscopic (2014). Social History:   reports that she quit smoking about 12 years ago. She has never used smokeless tobacco. She reports that she does not drink alcohol and does not use drugs. Family History:  No family history of premature coronary artery disease, aortic disease, or valve disease. Home Medications:  Were reviewed and are listed in nursing record.  and/or listed below  Prior to Admission medications    Medication Sig Start Date End Date Taking? Authorizing Provider   propranolol (INDERAL) 60 MG tablet Take 60 mg by mouth nightly   Yes Historical Provider, MD   nortriptyline (PAMELOR) 75 MG capsule 100 mg nightly 2 50 mg caps at bed time per pt 4/4/22   Historical Provider, MD   ELIQULALY 5 MG TABS tablet Take 1 tablet by mouth 2 times daily 6/16/21   Jerrell Siu MD   metFORMIN (GLUCOPHAGE) 500 MG tablet Take 1 tablet by mouth 2 times daily (with meals) 2/25/21   AQUILES Piper CNP   amLODIPine (NORVASC) 10 MG tablet Take 0.5 tablets by mouth daily  Patient not taking: Reported on 6/3/2022 2/25/21   AQUILES Piper CNP   LORazepam (ATIVAN) 0.5 MG tablet Take 0.5 mg by mouth every 6 hours as needed for Anxiety. Historical Provider, MD   HYDROcodone-acetaminophen (NORCO) 5-325 MG per tablet Take 1 tablet by mouth every 6 hours as needed for Pain. Historical Provider, MD   busPIRone (BUSPAR) 10 MG tablet Take 10 mg by mouth 2 times daily Unsure of mg amount  Patient not taking: Reported on 6/3/2022    Historical Provider, MD   Escitalopram Oxalate (LEXAPRO PO) Take by mouth nightly Unsure of mg amount    Historical Provider, MD   Misc. Devices [facility]) MISC Use with walking or standing at all times to prevent falls. 12/31/20   MAREN Jo   pregabalin (LYRICA) 50 MG capsule Take 1 capsule by mouth 2 times daily for 31 days. .  Patient taking differently: Take 50 mg by mouth 3 times daily.  Once during the day and two at night per pt 9/4/18 12/31/20  ArdeAQUILES Escoto CNP   levothyroxine (SYNTHROID) 25 MCG tablet TAKE ONE TABLET BY MOUTH DAILY ALONG WITH 200 MCG TABLET (225 MCG TOTAL)  Patient taking differently: 50 mcg Daily Patient states she takes 150 mcgs currently 8/1/18   AQUILES Mohamud CNP   buPROPion (WELLBUTRIN XL) 300 MG extended release tablet TAKE 1 TABLET BY MOUTH EVERY MORNING  Patient not taking: Reported on 6/3/2022 5/7/18   Janis Delgado MD   methocarbamol (ROBAXIN) 500 MG tablet TAKE 1 TABLET BY MOUTH 4 TIMES DAILY AS NEEDED (PAIN) 4/26/18   Janis Delgado MD   omeprazole (PRILOSEC) 40 MG delayed release capsule TAKE ONE CAPSULE BY MOUTH ONE TIME DAILY  Patient not taking: Reported on 6/3/2022 4/16/18   Janis Delgado MD   famotidine (PEPCID) 20 MG tablet Take 1 tablet by mouth 2 times daily  Patient taking differently: Take 20 mg by mouth 2 times daily as needed  12/21/17   Janis Delgado MD   fluticasone [facility]) 50 MCG/ACT nasal spray 2 sprays by Nasal route daily 11/24/17   Janis Delgado MD   promethazine (PHENERGAN) 25 MG tablet Take 1 tablet by mouth every 6 hours as needed for Nausea  Patient not taking: Reported on 6/3/2022 9/26/17   Janis Delgado MD   vitamin D (CHOLECALCIFEROL) 5000 units CAPS capsule Take 5,000 Units by mouth daily    Historical Provider, MD   Coenzyme Q10 (COQ-10 PO) Take by mouth daily    Historical Provider, MD   Calcium-Magnesium-Vitamin D 185- MG-MG-UNIT CAPS Take 1 capsule by mouth daily    Historical Provider, MD   acetaminophen (TYLENOL) 325 MG tablet Take 650 mg by mouth every 6 hours as needed for Pain    Historical Provider, MD   therapeutic multivitamin-minerals (THERAGRAN-M) tablet Take 1 tablet by mouth daily. Historical Provider, MD   Omega-3 Fatty Acids (FISH OIL) 1000 MG CAPS Take 1,000 mg by mouth daily.     Historical Provider, MD        Current Medications:  Current Facility-Administered Medications   Medication Dose Route Frequency Provider Last Rate Last Admin    apixaban (ELIQUIS) tablet 5 mg  5 mg Oral BID Ras Winters MD        fluticasone (FLONASE) 50 MCG/ACT nasal spray 2 spray  2 spray Nasal Daily Ras Winters MD        levothyroxine (SYNTHROID) tablet 225 mcg  225 mcg Oral Daily Ras Winters MD   225 mcg at 06/03/22 6798    nortriptyline (PAMELOR) capsule 100 mg  100 mg Oral Nightly Ras Winters MD   100 mg at 06/03/22 0318    vitamin D3 (CHOLECALCIFEROL) tablet 5,000 Units  5,000 Units Oral Daily Han Almaraz MD        therapeutic multivitamin-minerals 1 tablet  1 tablet Oral Daily Ras Winters MD        sodium chloride flush 0.9 % injection 5-40 mL  5-40 mL IntraVENous 2 times per day Han Almaraz MD        sodium chloride flush 0.9 % injection 5-40 mL  5-40 mL IntraVENous PRN Ras Winters MD        0.9 % sodium chloride infusion   IntraVENous PRN Han Almaraz MD        ondansetron (ZOFRAN-ODT) disintegrating tablet 4 mg  4 mg Oral Q8H PRN Ras Winters MD        Or    ondansetron (ZOFRAN) injection 4 mg  4 mg IntraVENous Q6H PRN Han Almaraz MD        acetaminophen (TYLENOL) tablet 650 mg  650 mg Oral Q6H PRN Han Almaraz MD        Or    acetaminophen (TYLENOL) suppository 650 mg  650 mg Rectal Q6H PRN Han Almaraz MD        polyethylene glycol (GLYCOLAX) packet 17 g  17 g Oral Daily PRN Han Almaraz MD        [START ON 6/4/2022] aspirin chewable tablet 81 mg  81 mg Oral Daily Ras Winters MD        regadenoson (LEXISCAN) injection 0.4 mg  0.4 mg IntraVENous ONCE PRN Ras Winters MD        0.9 % sodium chloride infusion   IntraVENous Continuous Ras Winters MD 75 mL/hr at 06/03/22 0315 New Bag at 06/03/22 0315    nitroGLYCERIN (NITROSTAT) SL tablet 0.4 mg  0.4 mg SubLINGual Q5 Min PRN Ras Winters MD        dextrose bolus 10% 125 mL  125 mL IntraVENous PRN Ras Winters MD        Or    dextrose bolus 10% 250 mL  250 mL IntraVENous PRN Han Almaraz MD        glucagon (rDNA) injection 1 mg  1 mg IntraMUSCular PRN Ras Winters MD        dextrose 5 % solution  100 mL/hr IntraVENous PRN Ras Winters MD        insulin lispro (HUMALOG) injection vial 0-6 Units  0-6 Units SubCUTAneous Q4H Ras Winters MD        hydrALAZINE (APRESOLINE) injection 10 mg  10 mg IntraVENous Q6H PRN Harmanpreet Swapnil Villalta MD        pregabalin (LYRICA) capsule 100 mg  100 mg Oral Daily Ras Winters MD        And    pregabalin (LYRICA) capsule 200 mg  200 mg Oral Nightly Ras Wintres MD   200 mg at 06/03/22 0276        Allergies:  Cephalosporins, Dilaudid [hydromorphone hcl], Fortaz [ceftazidime], and Statins     Review of Systems:     All systems reviewed and negative except as stated above. Physical Examination:    Vitals:    06/03/22 0830   BP: (!) 160/90   Pulse: 75   Resp: 18   Temp: 97.6 °F (36.4 °C)   SpO2: 96%    Weight: (!) 329 lb 5.9 oz (149.4 kg)       Body mass index is 50.08 kg/m². General Appearance:  Alert, cooperative, no distress, appears stated age   Head:  Normocephalic, without obvious abnormality, atraumatic   Eyes:  PERRL, conjunctiva/corneas clear   Nose: Nares normal, no drainage or sinus tenderness   Throat: Lips, mucosa, and tongue normal   Neck: Supple, symmetrical, trachea midline, no adenopathy, thyroid: not enlarged, symmetric, no tenderness/mass/nodules, no carotid bruit or JVD   Lungs:   Clear to auscultation bilaterally, respirations unlabored   Chest Wall:  No tenderness or deformity   Heart:  Regular rate and rhythm, S1 normal.  A2 absent. III/VI high pitched BHUPINDER.   No diastolic murmur, rub or gallop   Abdomen:   Soft, non-tender, bowel sounds active all four quadrants,  no masses, no organomegaly   Extremities: Extremities normal, atraumatic, no cyanosis or edema   Pulses: 2+ and symmetric   Skin: Skin color, texture, turgor normal, no rashes or lesions   Psych: Normal mood and affect   Neurologic: CN II-XII grossly intact        Labs  Lab Results   Component Value Date    PROBNP 253 06/02/2022    PROBNP 234 06/22/2021    PROBNP 586 12/31/2020         Lab Results   Component Value Date    CHOL 199 06/22/2021    TRIG 179 06/22/2021    HDL 60 06/22/2021    HDL 50 08/19/2011    LDLCALC 103 06/22/2021    LABVLDL 36 06/22/2021         Troponin   Date/Time Value Ref Range Status 06/03/2022 05:34 AM 0.02 (H) <0.01 ng/mL Final     Comment:     Methodology by Troponin T   06/03/2022 02:41 AM 0.03 (H) <0.01 ng/mL Final     Comment:     Methodology by Troponin T   06/02/2022 09:48 PM <0.01 <0.01 ng/mL Final     Comment:     Specimen hemolysis has exceeded the interference as defined by Roche. Value may be falsely decreased. Suggest recollection if clinically  indicated. Methodology by Troponin T             CBC:   Lab Results   Component Value Date    WBC 8.3 06/02/2022    RBC 5.31 06/02/2022    HGB 13.3 06/02/2022    HCT 41.9 06/02/2022    MCV 79.0 06/02/2022    RDW 16.4 06/02/2022     06/02/2022     CMP:    Lab Results   Component Value Date     06/03/2022    K 4.5 06/03/2022    CL 98 06/03/2022    CO2 28 06/03/2022    BUN 18 06/03/2022    CREATININE 1.0 06/03/2022    GFRAA >60 06/03/2022    GFRAA >60 02/13/2013    AGRATIO 1.2 06/03/2022    LABGLOM 55 06/03/2022    GLUCOSE 123 06/03/2022    PROT 6.8 06/03/2022    PROT 7.1 02/13/2013    CALCIUM 8.9 06/03/2022    BILITOT <0.2 06/03/2022    ALKPHOS 68 06/03/2022    AST 13 06/03/2022    ALT 10 06/03/2022     PT/INR:  No results found for: PTINR  Lab Results   Component Value Date    CKTOTAL 139 08/29/2017    TROPONINI 0.02 (H) 06/03/2022       EKG:  I have reviewed EKG with the following interpretation:  Impression:      Sinus rhythm with occasional Premature ventricular complexes  T wave abnormality, consider lateral ischemia  Abnormal ECG    Repeat EKG (during chest pain) - no ST elevation, still sinus    Tely - reviewed - rapid run of several salvos of VT each 4-6 beats in rapid succession, rate 180 bpm.    Echo: 2019  - Left ventricle: The cavity size was normal. Wall thickness was    increased in a pattern of mild LVH. Systolic function was    vigorous. The estimated ejection fraction was in the range of 65%    to 70%. Wall motion was normal; there were no regional wall motion    abnormalities.  Doppler parameters are consistent with abnormal    left ventricular relaxation (grade 1 diastolic dysfunction). - Left atrium: The atrium was mildly to moderately dilated. - Right ventricle: Systolic function was normal.  - Pulmonic valve: Peak gradient (S): 9mm Hg.     Impressions:  Velocity across AV increased. Technically difficult  study, unable to determine if there is Aortic Stenosis due to AV  being poorly visualized  Recommend repeating study. Stress: 2015 Lexiscan wnl    Cath: no prior cath on file    MRI/EP/Other: 2/5/2021 - DCCV by Dr Marcelo Vidal to [facility]    Old notes reviewed  Telemetry reviewd  Ekg personally reviewed  Chest xray personally reviewed  Echo, stress, cath, and/or other cardiac testing reviewed in detail   Medications and labs reviewed    High complexity/medical decision making due to extensive data review, extensive history review, independent review of data    High risk due to acute illness, evaluation of drug-drug interactions, medication management and diagnostic interventions    I will address the patient's cardiac risk factors and adjusted pharmacologic treatment as needed. In addition, I have reinforced the need for patient directed risk factor modification. Tobacco use was discussed with the patient and educated on the negative effects. I have asked the patient to not utilize these agents. Thank you for allowing to us to participate in the care or [facility]. Further evaluation will be based upon the patient's clinical course and testing results. All questions and concerns were addressed to the patient/family. Alternatives to my treatment were discussed. The note was completed using EMR. Every effort was made to ensure accuracy; however, inadvertent computerized transcription errors may be present.       Martha Jessica MD, MD 6/3/2022 9:00 AM

## 2022-06-03 NOTE — H&P
_    [facility]    6/3/2022 12:32 AM    Patient Information:  Lesia Barron is a 74 y.o. female [id number]    PCP:  Jeff Cheung MD (Tel: [phone] )    Date of Service:   Pt seen/examined on 6/2/22     Placed in Observation. Chief complaint:    Chief Complaint   Patient presents with    Chest Pain     CP started about 2100 with jaw pain and sweating. pt. arrived by [place]. pt. pain free. History of Present Illness:  Kings Will is a 74 y.o. female who presented with   · Comes form home   · She has been having on and off episodes of jaw pain and it has been a/w chest pain and SOB and also diaphoresis and palpitations @ Home . Related to exertional and stressful activities @ Home . Today she had another episode like this in AM . She came home from outside and rested for a while . But s/s did not bharati this time and hence she came to ED for an evaluation  . She called 911 . S/s had abated by time of EMS arrival   · Is now chest pain free in ED for me     History and pertinent information obtained from   · ED provider   · Prior Chart    · Patient         Vitals:    06/02/22 2226 06/02/22 2234 06/03/22 0000   BP:  (!) 169/75 (!) 127/100   Pulse: (!) 101  82   Resp: 18  12   Temp: 98.2 °F (36.8 °C)     TempSrc: Oral     SpO2: 96%  96%          · Imaging/Labs/Work up/Medications given/Consults called by ED => Reviewed and Noted    · ASA X 1       No current facility-administered medications for this encounter. On my evaluation, patient's condition as below :   · Since arrival to ED, post above Rx, patient is AO X 3   · No s/s now   · No SOB now   · No chest pain     POC d/w her     No obvious signs of s/o acute Life Threatening Respiratory distress or acute Cardiac or hemodynamic instability, noted @ time of my evaluation of patient. 10 complete review of symptoms performed. Pertinent positives and negatives listed above in HPI. taking differently: 50 mcg Daily Patient states she takes 150 mcgs currently) 90 tablet 0    buPROPion (WELLBUTRIN XL) 300 MG extended release tablet TAKE 1 TABLET BY MOUTH EVERY MORNING 30 tablet 1    methocarbamol (ROBAXIN) 500 MG tablet TAKE 1 TABLET BY MOUTH 4 TIMES DAILY AS NEEDED (PAIN) 60 tablet 2    omeprazole (PRILOSEC) 40 MG delayed release capsule TAKE ONE CAPSULE BY MOUTH ONE TIME DAILY 90 capsule 1    famotidine (PEPCID) 20 MG tablet Take 1 tablet by mouth 2 times daily 60 tablet 2    fluticasone (FLONASE) 50 MCG/ACT nasal spray 2 sprays by Nasal route daily 1 Bottle 0    promethazine (PHENERGAN) 25 MG tablet Take 1 tablet by mouth every 6 hours as needed for Nausea 15 tablet 0    vitamin D (CHOLECALCIFEROL) 5000 units CAPS capsule Take 5,000 Units by mouth daily      Coenzyme Q10 (COQ-10 PO) Take by mouth daily      Calcium-Magnesium-Vitamin D 185- MG-MG-UNIT CAPS Take 1 capsule by mouth daily      acetaminophen (TYLENOL) 325 MG tablet Take 650 mg by mouth every 6 hours as needed for Pain      therapeutic multivitamin-minerals (THERAGRAN-M) tablet Take 1 tablet by mouth daily.  Omega-3 Fatty Acids (FISH OIL) 1000 MG CAPS Take 1,000 mg by mouth daily. Allergies: Allergies   Allergen Reactions    Cephalosporins      Cough and congestion    Dilaudid [Hydromorphone Hcl] Itching     Nervous, unable to sleep   Heladio Catarino [Ceftazidime]      Cough and congestion    Statins Other (See Comments)     Muscle cramping/weakness          Social History:   reports that she quit smoking about 12 years ago. She has never used smokeless tobacco. She reports that she does not drink alcohol and does not use drugs.        Family History:            Problem Relation Age of Onset    Diabetes Mother     Hypertension Mother     Stroke Father     Heart Disease Brother         aortic stenosis           Physical Exam:  BP (!) 127/100   Pulse 82   Temp 98.2 °F (36.8 °C) (Oral)   Resp 12 SpO2 96%     General appearance:  Appears anxious    Eyes:  Sclera clear, pupils equal  ENT:  Dry   mucus membranes, Trachea midline. Cardiovascular: Regular rhythm, normal S1, S2. No edema in lower extremities   Respiratory:   Noted Clear to auscultation bilaterally,  No wheeze, good inspiratory effort   Gastrointestinal:  Abdomen soft,  non-tender,  not distended,  normal bowel sounds   Musculoskeletal:  No cyanosis in digits, neck supple  Neurology:  Cranial nerves grossly intact. Alert and oriented in time, place and person. No speech or motor deficits   Psychiatry:  Appropriate affect. Not agitated  Skin:  Warm, dry, normal turgor, no rash       Labs:     Recent Labs     06/02/22 2148   WBC 8.3   HGB 13.3   HCT 41.9        Recent Labs     06/02/22 2148      K 5.2*      CO2 28   BUN 19   CREATININE 1.0   CALCIUM 8.8     Recent Labs     06/02/22 2148   AST 19   ALT 12   BILITOT <0.2   ALKPHOS 71     No results for input(s): INR in the last 72 hours. Recent Labs     06/02/22 2148   TROPONINI <0.01         Urinalysis:      Lab Results   Component Value Date    NITRU Negative 12/31/2020    WBCUA 0-2 10/08/2016    BACTERIA 2+ 10/08/2016    RBCUA None seen 10/08/2016    BLOODU Negative 12/31/2020    SPECGRAV >=1.030 12/31/2020    GLUCOSEU Negative 12/31/2020         Radiology:     CXR:   I have reviewed the CXR  No acute findings or is unremarkable for any acute pathology needing acute interventions, on review. EKG/Telemonitor :    I have reviewed the EKG  TWI in lateral leads   SR     IMAGING :     XR CHEST PORTABLE   Final Result   No acute cardiopulmonary process               PROBLEM LIST [ ACTIVE + CHRONIC ]     Active Hospital Problems    Diagnosis Date Noted    Chest pain [R07.9] 06/03/2022     Priority: Medium         ACUTE/CHRONIC ISSUES      Chest pain , r/o ACS , POA    Uncontrolled HTN    Mild Hyperkalemia POA      HTN Hx    H/o aortic stenosis    AFIB Hx .  Has received CVN in past in 2020    Anxiety and depression    Fibromyalgia Hx    Hypothyroidism Hx    Diabetic neuropathy Hx    DM 2     PERTINENT PLAN OF CARE       Will continue cardiac monitor while inpatient. Admission EKG reviewed . EKG prn chest pain. Cardiac enzymes on admission in ED noted per Labs and will trend cardiac enzymes further while inpatient. => Medication Plan for now will be as follows . Started ASA QD and  NTG Prn  .    Planned CARDS cs in AM . Will keep NPO PM and defer intervention/testing to CARDS in AM    Trend troponin X 3 in house    For DM Mgt, while inpatient, start Insulin Regimen per orders and will closely monitor blood sugars and adjust regimen prn. Rx of Hypoglycemia prn, per order sets.  Held home ACE I 2/2 mild Hyperkalemia @ admission . F/u labs QD    Hydralazine prn for SBP > 160s     · Medication received in ED and workup in ED so far Noted and reviewed as above. · Home medications for chronic medical problems Reviewed and Held/Resumed/Changes made, as clinically warranted/Indicated. · Please See EPIC Order for detailed plans of care and further Details. · DVT Prophylaxis . Is on Eliquis BID    ; + SCDs   · Nutrition/Diet. No diet orders on file  · Code Status/Advanced Care Plan . Prior  · PT/OT and ambulatory Eval Status. Ambulate with Assist    · Probable LOS & future Disposition planned post discharge . Home in 1-2 days       CONSULTS ORDERED @ ADMISSION   None      Medical Decision Making : HIGH     Total patient  Care [ Direct and Indirect ] time spent in evaluating the patient an discussing plan with appropriate staff/patient/family members is approximately ~ 54 min       Gala Garcia MD    Hospitalist, [facility].    6/3/2022 12:38 AM

## 2022-06-04 LAB
A/G RATIO: 1.4 (ref 1.1–2.2)
ALBUMIN SERPL-MCNC: 3.9 G/DL (ref 3.4–5)
ALP BLD-CCNC: 66 U/L (ref 40–129)
ALT SERPL-CCNC: 10 U/L (ref 10–40)
ANION GAP SERPL CALCULATED.3IONS-SCNC: 8 MMOL/L (ref 3–16)
AST SERPL-CCNC: 12 U/L (ref 15–37)
BILIRUB SERPL-MCNC: 0.4 MG/DL (ref 0–1)
BUN BLDV-MCNC: 15 MG/DL (ref 7–20)
CALCIUM SERPL-MCNC: 8.8 MG/DL (ref 8.3–10.6)
CHLORIDE BLD-SCNC: 103 MMOL/L (ref 99–110)
CHOLESTEROL, TOTAL: 181 MG/DL (ref 0–199)
CO2: 29 MMOL/L (ref 21–32)
CREAT SERPL-MCNC: 0.9 MG/DL (ref 0.6–1.2)
EKG ATRIAL RATE: 69 BPM
EKG DIAGNOSIS: NORMAL
EKG P AXIS: 26 DEGREES
EKG P-R INTERVAL: 130 MS
EKG Q-T INTERVAL: 402 MS
EKG QRS DURATION: 114 MS
EKG QTC CALCULATION (BAZETT): 430 MS
EKG R AXIS: -24 DEGREES
EKG T AXIS: 73 DEGREES
EKG VENTRICULAR RATE: 69 BPM
GFR AFRICAN AMERICAN: >60
GFR NON-AFRICAN AMERICAN: >60
GLUCOSE BLD-MCNC: 109 MG/DL (ref 70–99)
GLUCOSE BLD-MCNC: 113 MG/DL (ref 70–99)
GLUCOSE BLD-MCNC: 113 MG/DL (ref 70–99)
GLUCOSE BLD-MCNC: 118 MG/DL (ref 70–99)
GLUCOSE BLD-MCNC: 121 MG/DL (ref 70–99)
GLUCOSE BLD-MCNC: 128 MG/DL (ref 70–99)
GLUCOSE BLD-MCNC: 99 MG/DL (ref 70–99)
HCT VFR BLD CALC: 40 % (ref 36–48)
HDLC SERPL-MCNC: 44 MG/DL (ref 40–60)
HEMOGLOBIN: 12.8 G/DL (ref 12–16)
LDL CHOLESTEROL CALCULATED: 114 MG/DL
MCH RBC QN AUTO: 25.5 PG (ref 26–34)
MCHC RBC AUTO-ENTMCNC: 32 G/DL (ref 31–36)
MCV RBC AUTO: 79.7 FL (ref 80–100)
PDW BLD-RTO: 16.5 % (ref 12.4–15.4)
PERFORMED ON: ABNORMAL
PERFORMED ON: NORMAL
PLATELET # BLD: 257 K/UL (ref 135–450)
PMV BLD AUTO: 7.8 FL (ref 5–10.5)
POTASSIUM REFLEX MAGNESIUM: 4.3 MMOL/L (ref 3.5–5.1)
RBC # BLD: 5.02 M/UL (ref 4–5.2)
SODIUM BLD-SCNC: 140 MMOL/L (ref 136–145)
TOTAL PROTEIN: 6.6 G/DL (ref 6.4–8.2)
TRIGL SERPL-MCNC: 115 MG/DL (ref 0–150)
VLDLC SERPL CALC-MCNC: 23 MG/DL
WBC # BLD: 5.4 K/UL (ref 4–11)

## 2022-06-04 PROCEDURE — 2580000003 HC RX 258: Performed by: HOSPITALIST

## 2022-06-04 PROCEDURE — 6370000000 HC RX 637 (ALT 250 FOR IP): Performed by: INTERNAL MEDICINE

## 2022-06-04 PROCEDURE — 80061 LIPID PANEL: CPT

## 2022-06-04 PROCEDURE — 93010 ELECTROCARDIOGRAM REPORT: CPT | Performed by: INTERNAL MEDICINE

## 2022-06-04 PROCEDURE — 6370000000 HC RX 637 (ALT 250 FOR IP): Performed by: NURSE PRACTITIONER

## 2022-06-04 PROCEDURE — 36415 COLL VENOUS BLD VENIPUNCTURE: CPT

## 2022-06-04 PROCEDURE — 85027 COMPLETE CBC AUTOMATED: CPT

## 2022-06-04 PROCEDURE — 99233 SBSQ HOSP IP/OBS HIGH 50: CPT | Performed by: INTERNAL MEDICINE

## 2022-06-04 PROCEDURE — 80053 COMPREHEN METABOLIC PANEL: CPT

## 2022-06-04 PROCEDURE — 93005 ELECTROCARDIOGRAM TRACING: CPT | Performed by: HOSPITALIST

## 2022-06-04 PROCEDURE — 2060000000 HC ICU INTERMEDIATE R&B

## 2022-06-04 PROCEDURE — 6370000000 HC RX 637 (ALT 250 FOR IP): Performed by: HOSPITALIST

## 2022-06-04 RX ORDER — SOTALOL HYDROCHLORIDE 80 MG/1
80 TABLET ORAL 2 TIMES DAILY
Status: DISCONTINUED | OUTPATIENT
Start: 2022-06-04 | End: 2022-06-06 | Stop reason: HOSPADM

## 2022-06-04 RX ORDER — CALCIUM CARBONATE 200(500)MG
500 TABLET,CHEWABLE ORAL ONCE
Status: COMPLETED | OUTPATIENT
Start: 2022-06-04 | End: 2022-06-04

## 2022-06-04 RX ORDER — HYDROCODONE BITARTRATE AND ACETAMINOPHEN 5; 325 MG/1; MG/1
1 TABLET ORAL EVERY 6 HOURS PRN
Status: DISCONTINUED | OUTPATIENT
Start: 2022-06-04 | End: 2022-06-06 | Stop reason: HOSPADM

## 2022-06-04 RX ORDER — AMLODIPINE BESYLATE 5 MG/1
5 TABLET ORAL DAILY
Status: DISCONTINUED | OUTPATIENT
Start: 2022-06-05 | End: 2022-06-06 | Stop reason: HOSPADM

## 2022-06-04 RX ORDER — LIDOCAINE 4 G/G
1 PATCH TOPICAL DAILY
Status: DISCONTINUED | OUTPATIENT
Start: 2022-06-04 | End: 2022-06-06 | Stop reason: HOSPADM

## 2022-06-04 RX ADMIN — ANTACID TABLETS 500 MG: 500 TABLET, CHEWABLE ORAL at 20:11

## 2022-06-04 RX ADMIN — CLOPIDOGREL BISULFATE 75 MG: 75 TABLET ORAL at 09:00

## 2022-06-04 RX ADMIN — LEVOTHYROXINE SODIUM 225 MCG: 150 TABLET ORAL at 06:05

## 2022-06-04 RX ADMIN — SOTALOL HYDROCHLORIDE 80 MG: 80 TABLET ORAL at 15:45

## 2022-06-04 RX ADMIN — METOPROLOL SUCCINATE 50 MG: 50 TABLET, EXTENDED RELEASE ORAL at 09:00

## 2022-06-04 RX ADMIN — Medication 1 TABLET: at 08:58

## 2022-06-04 RX ADMIN — Medication 10 ML: at 20:03

## 2022-06-04 RX ADMIN — PREGABALIN 100 MG: 75 CAPSULE ORAL at 08:59

## 2022-06-04 RX ADMIN — APIXABAN 5 MG: 5 TABLET, FILM COATED ORAL at 20:02

## 2022-06-04 RX ADMIN — NORTRIPTYLINE HYDROCHLORIDE 100 MG: 25 CAPSULE ORAL at 22:30

## 2022-06-04 RX ADMIN — ACETAMINOPHEN 650 MG: 325 TABLET ORAL at 07:10

## 2022-06-04 RX ADMIN — FAMOTIDINE 20 MG: 20 TABLET ORAL at 09:00

## 2022-06-04 RX ADMIN — PREGABALIN 200 MG: 75 CAPSULE ORAL at 22:30

## 2022-06-04 RX ADMIN — CHOLECALCIFEROL TAB 125 MCG (5000 UNIT) 5000 UNITS: 125 TAB at 08:58

## 2022-06-04 RX ADMIN — FAMOTIDINE 20 MG: 20 TABLET ORAL at 20:02

## 2022-06-04 RX ADMIN — Medication 10 ML: at 09:06

## 2022-06-04 RX ADMIN — METHOCARBAMOL 500 MG: 500 TABLET ORAL at 07:10

## 2022-06-04 RX ADMIN — ASPIRIN 81 MG: 81 TABLET, CHEWABLE ORAL at 08:58

## 2022-06-04 ASSESSMENT — PAIN DESCRIPTION - LOCATION
LOCATION: BACK
LOCATION: BACK

## 2022-06-04 ASSESSMENT — PAIN SCALES - GENERAL
PAINLEVEL_OUTOF10: 8
PAINLEVEL_OUTOF10: 8
PAINLEVEL_OUTOF10: 2

## 2022-06-04 NOTE — PROGRESS NOTES
[facility] PROGRESS NOTE    6/4/2022 11:25 AM        Name: Tj Estrella Admitted: 6/2/2022  Primary Care Provider: Jackie Rosales MD (Tel: [phone])      Chief Complaint   Patient presents with    Chest Pain     CP started about 2100 with jaw pain and sweating. pt. arrived by [employer]. pt. pain free. Brief History: Patient is a 75 yo female with hx fibromyalgia, HLD, HTN, hypothyroidism, DM2. She presented to ER with c/o chest and jaw pain associated with shortness of breath, diaphoresis and palpitations. Symptoms come on with exertion and ease with rest. She was pain free by the time she arrived in ER. EKG with nonspecific T wave abnormality. Troponin < 0.01 on presentation, subsequent 0.03. [facility] 6/3 with nonobstructive CAD. Subjective:  Resting in bed. She continues to note episodes of chest heaviness, shortness of breath and palpitations which per nursing seem to correlate with NSVT on telemetry. Patient admits symptoms less severe and of short duration but she is anxious with thought of going home and it happening again.      Reviewed interval ancillary notes    Current Medications  fluticasone (FLONASE) 50 MCG/ACT nasal spray 2 spray, Daily  levothyroxine (SYNTHROID) tablet 225 mcg, Daily  vitamin D3 (CHOLECALCIFEROL) tablet 5,000 Units, Daily  therapeutic multivitamin-minerals 1 tablet, Daily  sodium chloride flush 0.9 % injection 5-40 mL, 2 times per day  sodium chloride flush 0.9 % injection 5-40 mL, PRN  0.9 % sodium chloride infusion, PRN  ondansetron (ZOFRAN-ODT) disintegrating tablet 4 mg, Q8H PRN   Or  ondansetron (ZOFRAN) injection 4 mg, Q6H PRN  acetaminophen (TYLENOL) tablet 650 mg, Q6H PRN   Or  acetaminophen (TYLENOL) suppository 650 mg, Q6H PRN  polyethylene glycol (GLYCOLAX) packet 17 g, Daily PRN  aspirin chewable tablet 81 mg, Daily  regadenoson (LEXISCAN) injection 0.4 mg, ONCE PRN  0.9 % sodium chloride infusion, Continuous  nitroGLYCERIN (NITROSTAT) SL tablet 0.4 mg, Q5 Min PRN  dextrose bolus 10% 125 mL, PRN   Or  dextrose bolus 10% 250 mL, PRN  glucagon (rDNA) injection 1 mg, PRN  dextrose 5 % solution, PRN  insulin lispro (HUMALOG) injection vial 0-6 Units, Q4H  hydrALAZINE (APRESOLINE) injection 10 mg, Q6H PRN  pregabalin (LYRICA) capsule 100 mg, Daily   And  pregabalin (LYRICA) capsule 200 mg, Nightly  metoprolol succinate (TOPROL XL) extended release tablet 50 mg, Daily  amLODIPine (NORVASC) tablet 10 mg, Daily  LORazepam (ATIVAN) tablet 0.5 mg, Q6H PRN  methocarbamol (ROBAXIN) tablet 500 mg, 4x Daily PRN  famotidine (PEPCID) tablet 20 mg, BID  clopidogrel (PLAVIX) tablet 75 mg, Daily  nortriptyline (PAMELOR) capsule 100 mg, Nightly      Objective:  BP (!) 150/83   Pulse 79   Temp 97.6 °F (36.4 °C) (Oral)   Resp 16   Ht 5' 8\" (1.727 m)   Wt (!) 315 lb 4.1 oz (143 kg)   SpO2 92%   BMI 47.93 kg/m²     Intake/Output Summary (Last 24 hours) at 6/4/2022 1125  Last data filed at 6/3/2022 2004  Gross per 24 hour   Intake 592.22 ml   Output 250 ml   Net 342.22 ml      Wt Readings from Last 3 Encounters:   06/04/22 (!) 315 lb 4.1 oz (143 kg)   05/27/22 (!) 324 lb 3.2 oz (147.1 kg)   11/24/21 (!) 319 lb 11.2 oz (145 kg)     General:  Awake, alert, oriented in NAD  Skin:  Warm and dry. No unusual bruising or rash  Neck:  Supple. No JVD appreciated  Chest:  Normal effort.   Clear to auscultation, no wheezes/rhonchi/rales  Cardiovascular:  RRR, normal S1/S2, + BHUPINDER  Abdomen:  Soft, nontender, +bowel sounds  Extremities:  No edema  Neurological: No focal deficits  Psychological: Normal mood and affect      Labs and Tests:  CBC:   Recent Labs     06/02/22 2148 06/04/22  0535   WBC 8.3 5.4   HGB 13.3 12.8    257     BMP:    Recent Labs     06/02/22 2148 06/03/22  0241 06/04/22  0535    135* 140   K 5.2* 4.5 4.3    98* 103   CO2 28 28 29   BUN 19 18 15 CREATININE 1.0 1.0 0.9   GLUCOSE 124* 123* 118*     Hepatic:   Recent Labs     06/02/22  2148 06/03/22  0241 06/04/22  0535   AST 19 13* 12*   ALT 12 10 10   BILITOT <0.2 <0.2 0.4   ALKPHOS 71 68 66       CXR 6/2/2022:  No acute cardiopulmonary process    Echo 6/3/2022:  Summary   -Technically difficult and limited exam due to body habitus.   -Normal overall global LV function with ejection fraction estimated at 55%. -Definity contrast administered.   -Abnormal motion of the distal septum and apex.   -Wall motion assessment was limited due to poor endocardial border definition.   -Moderate aortic stenosis with a peak velocity of 3.43m/s and a mean pressure gradient of 31 mmHg. There is mild aortic insufficiency.   -Thickened mitral valve without evidence of significant stenosis or  regurgitation.   -Grade I diastolic dysfunction with normal LV filling pressures. Avg, E/e'=12.3    [facility] 6/3/3022: Findings:  Artery Findings/Result   LM Normal   LAD Proximal ectasia, 40% distal   Cx Proximal to mid ectasia   RI N/A   RCA Proximal to mid ectasia, 30% distal   LVEDP NA   LVG NA      Intervention:         None     Post Cath Dx:       Nonobstructive CAD      Problem List  Principal Problem:    Chest pain  Active Problems:    ELLIE (obstructive sleep apnea)    NSVT (nonsustained ventricular tachycardia) ([address]  Resolved Problems:    * No resolved hospital problems. *       Assessment & Plan:   1. Chest pain / elevated troponin. Troponin <0.01->0.03->0. 02. No acute changes on EKG. [facility] (6/3) with non-obstructive CAD. Continues to note intermittent episodes chest heaviness which correlate with NSVT on telemetry. Cardio on board. Continue asa, statin and beta blocker. 2. Symptomatic NSVT. Was taking propranolol prior to admission for BP and tremors, has been switched to metoprolol per cardio. Continues to have short runs NSVT. TSH 1.11, potassium and magnesium WNL. Has been seen by EP, plan Holter monitor on DC.  If sustained arrhythmia will consider EP study. 3. Aortic stenosis. Echo demonstrates moderate AS with mean gradient 31 mmHg. 4. HTN. Elevated on presentation, improved. Continue amlodipine. 5. PAF. Remains in sinus rhythm. Continue beta blocker. Apixaban on hold, resume when okay with cardio. 6. DM2. A1c 6.1. Takes metformin at home, held on admission. Continue low dose correction. 7. Fibromyalgia / chronic low back pain. Continue pregabalin, prn robaxin. Add prn Norco as taking at home. Add topical pain patch. 8. Hypothyroidism. TSH 1.11. Continue levothyroxine. Diet: ADULT DIET;  Regular  Code:Full Code  DVT PPX: scd (resume apixaban when okay with cardiology)      AQUILES Anders CNP   6/4/2022 11:25 AM

## 2022-06-04 NOTE — PLAN OF CARE
Problem: Discharge Planning  Goal: Discharge to home or other facility with appropriate resources  Outcome: Progressing     Problem: Pain  Goal: Verbalizes/displays adequate comfort level or baseline comfort level  Outcome: Progressing     Problem: Skin/Tissue Integrity  Goal: Absence of new skin breakdown  Description: 1. Monitor for areas of redness and/or skin breakdown  2. Assess vascular access sites hourly  3. Every 4-6 hours minimum:  Change oxygen saturation probe site  4. Every 4-6 hours:  If on nasal continuous positive airway pressure, respiratory therapy assess nares and determine need for appliance change or resting period.   Outcome: Progressing     Problem: Safety - Adult  Goal: Free from fall injury  Outcome: Progressing     Problem: ABCDS Injury Assessment  Goal: Absence of physical injury  Outcome: Progressing     Problem: Neurosensory - Adult  Goal: Achieves stable or improved neurological status  Outcome: Progressing     Problem: Respiratory - Adult  Goal: Achieves optimal ventilation and oxygenation  Outcome: Progressing     Problem: Cardiovascular - Adult  Goal: Maintains optimal cardiac output and hemodynamic stability  Outcome: Progressing

## 2022-06-04 NOTE — PROGRESS NOTES
[address]   Progress Note  Cardiology      Lani Roles   Admission date:  6/2/2022  CC-f/up symptomatic PVC  Subjective:  Had symptoms this am with run of simone    Objective:  Medications/Labs all Reviewed     lidocaine  1 patch TransDERmal Daily    [START ON 6/5/2022] amLODIPine  5 mg Oral Daily    sotalol  80 mg Oral BID    fluticasone  2 spray Nasal Daily    levothyroxine  225 mcg Oral Daily    vitamin D3  5,000 Units Oral Daily    therapeutic multivitamin-minerals  1 tablet Oral Daily    sodium chloride flush  5-40 mL IntraVENous 2 times per day    insulin lispro  0-6 Units SubCUTAneous Q4H    pregabalin  100 mg Oral Daily    And    pregabalin  200 mg Oral Nightly    famotidine  20 mg Oral BID    nortriptyline  100 mg Oral Nightly       BMP:   Lab Results   Component Value Date     06/04/2022    K 4.3 06/04/2022     06/04/2022    CO2 29 06/04/2022    BUN 15 06/04/2022    CREATININE 0.9 06/04/2022    MG 2.10 06/03/2022     CBC:    Lab Results   Component Value Date    WBC 5.4 06/04/2022    RBC 5.02 06/04/2022    HGB 12.8 06/04/2022    HCT 40.0 06/04/2022    MCV 79.7 06/04/2022    RDW 16.5 06/04/2022     06/04/2022      PT/INR:    Lab Results   Component Value Date    INR 1.90 (H) 02/05/2021    PROTIME 15.2 (H) 02/02/2021     Cardiac Enzymes:    Lab Results   Component Value Date    CKTOTAL 139 08/29/2017     Lab Results   Component Value Date    TROPONINI 0.02 (H) 06/03/2022    TROPONINI 0.03 (H) 06/03/2022    TROPONINI <0.01 06/02/2022     BNP:  No results found for: BNP  FASTING LIPID PANEL:    Lab Results   Component Value Date    CHOL 181 06/04/2022    HDL 44 06/04/2022    HDL 50 08/19/2011    TRIG 115 06/04/2022       Physical Examination:    BP (!) 159/93   Pulse 71   Temp 98.1 °F (36.7 °C) (Axillary)   Resp 16   Ht 5' 8\" (1.727 m)   Wt (!) 315 lb 4.1 oz (143 kg)   SpO2 93%   BMI 47.93 kg/m²      Respiratory:  · Resp Assessment: Normal respiratory effort  · Resp Auscultation: Clear to auscultation bilaterally   Cardiovascular:  · Auscultation: regular rate and rhythm, normal V7P4, systolic murmur, rub or gallop  · Palpation:  Nl PMI  · JVP:  normal  · Extremities: No Edema  Abdomen:  · Soft, non-tender  · Normal bowel sounds  Extremities:  ·  No Cyanosis or Clubbing  Neurological/Psychiatric:  · Oriented to time, place, and person  · Non-anxious  Skin Warm and dry    Assessment:    Principal Problem:    Chest pain has mild to moderate aortic stenosis with arrythmia seen (Nonsustained v. Tach)    Active Problems:    ELLIE (obstructive sleep apnea)      NSVT (nonsustained ventricular tachycardia) ([facility] [address]  Plan: will start betapace      Plan:  1.  observe on tele while starting betapace    I have spent  35  minutes of face to face time with the patient with more than 50%  spent  counseling and coordinating care for [employer]

## 2022-06-04 NOTE — PLAN OF CARE
Patient presented with chest pain. Subsequent troponin positive for NSTEMI. Noted to have NSVT on telemetry which was symptomatic. Echo with at least moderate AS. Patient to have [facility] and EP evaluation. Transition to inpatient admission.

## 2022-06-04 NOTE — DISCHARGE INSTR - COC
Continuity of Care Form    Patient Name: Chayito Mueller   :  1955  MRN:  [id number]    Admit date:  2022  Discharge date:  ***    Code Status Order: Full Code   Advance Directives:      Admitting Physician:  Chaparrita Sosa MD  PCP: Nicholas Luna MD    Discharging Nurse: [facility] Unit/Room#: [id number]-9857/6784-63  Discharging Unit Phone Number: ***    Emergency Contact:   Extended Emergency Contact Information  Primary Emergency Contact: Shantel Chen [facility] [address] [facility] Phone: [phone]  Relation: Child  Secondary Emergency Contact: [facility]  Address: [address] [facility] Phone: [phone]  Relation: Child    Past Surgical History:  Past Surgical History:   Procedure Laterality Date    ANKLE FRACTURE SURGERY      right     SECTION      CHOLECYSTECTOMY, LAPAROSCOPIC  2014    LAPAROSCOPIC CHOLECYSTECTOMY WITH INTRAOPERATIVE    COLONOSCOPY      ENDOSCOPY, COLON, DIAGNOSTIC      HYSTERECTOMY      LYMPH NODE BIOPSY         Immunization History:   Immunization History   Administered Date(s) Administered    Influenza Vaccine, unspecified formulation 10/15/2008, 2009, 2009    Influenza Virus Vaccine 2009, 10/21/2015    Influenza, Intradermal, Quadrivalent, Preservative Free 2016, 2017    Pneumococcal Polysaccharide (Naymspaqv32) 2009       Active Problems:  Patient Active Problem List   Diagnosis Code    Recurrent major depressive disorder, in full remission ([facility] [address] F33.42    Fatigue R53.83    Hyperglycemia R73.9    Hyperlipidemia E78.5    Aortic stenosis I35.0    Ankle edema M25.473    Hypothyroidism E03.9    HTN (hypertension) I10    Fibromyalgia M79.7    Myofascial pain syndrome M79.18    DDD (degenerative disc disease), lumbosacral M51.37    Acute renal failure (HCC) N17.9    Bradycardia R00.1    Pancreatitis K85.90 Biliary dyskinesia K82.8    Syncope, near R55    Injury of left wrist S69. 92XA    Obesity, morbid, BMI 40.0-49.9 ([facility]) E66.01    Morbid obesity due to excess calories ([facility]) E66.01    Chest pain R07.9    ELLIE (obstructive sleep apnea) G47.33    NSVT (nonsustained ventricular tachycardia) ([facility]) I47.2       Isolation/Infection:   Isolation            No Isolation          Patient Infection Status       None to display            Nurse Assessment:  Last Vital Signs: BP (!) 150/83   Pulse 79   Temp 97.6 °F (36.4 °C) (Oral)   Resp 16   Ht 5' 8\" (1.727 m)   Wt (!) 315 lb 4.1 oz (143 kg)   SpO2 92%   BMI 47.93 kg/m²     Last documented pain score (0-10 scale): Pain Level: 8  Last Weight:   Wt Readings from Last 1 Encounters:   06/04/22 (!) 315 lb 4.1 oz (143 kg)     Mental Status:  {IP PT MENTAL STATUS:20030}    IV Access:  { JUAN IV ACCESS:163087622}    Nursing Mobility/ADLs:  Walking   {CHP DME EMCL:139411570}  Transfer  {CHP DME CQOW:526964918}  Bathing  {CHP DME ZYOQ:336593260}  Dressing  {CHP DME ULGE:748579944}  Toileting  {CHP DME POAB:876190791}  Feeding  {CHP DME TOOA:063948871}  Med Admin  {CHP DME XOVY:574280543}  Med Delivery   { JUAN MED Delivery:082251292}    Wound Care Documentation and Therapy:  Incision 06/26/14 Abdomen (Active)   Number of days: 2900        Elimination:  Continence: Bowel: {YES / AF:10335}  Bladder: {YES / BW:72812}  Urinary Catheter: {Urinary Catheter:686527706}   Colostomy/Ileostomy/Ileal Conduit: {YES / :19698}       Date of Last BM: ***    Intake/Output Summary (Last 24 hours) at 6/4/2022 1146  Last data filed at 6/3/2022 2004  Gross per 24 hour   Intake 592.22 ml   Output 250 ml   Net 342.22 ml     I/O last 3 completed shifts: In: 602.2 [P.O.:240;  I.V.:362.2]  Out: 400 [Urine:400]    Safety Concerns:     [address] JUAN Safety Concerns:786968891}    Impairments/Disabilities:      [address] MARTINEZ Impairments/Disabilities:026971741}    Nutrition Therapy:  Current Nutrition Therapy:   Samir Day JUAN Diet List:334566265}    Routes of Feeding: {CHP DME Other Feedings:768113279}  Liquids: {Slp liquid thickness:57861}  Daily Fluid Restriction: {CHP DME Yes amt example:367554399}  Last Modified Barium Swallow with Video (Video Swallowing Test): {Done Not Done SSLD:029314759}    Treatments at the Time of Hospital Discharge:   Respiratory Treatments: ***  Oxygen Therapy:  {Therapy; copd oxygen:09565}  Ventilator:    { CC Vent VQUX:802765462}    Rehab Therapies: {THERAPEUTIC INTERVENTION:7564514637}  Weight Bearing Status/Restrictions: { CC Weight Bearin}  Other Medical Equipment (for information only, NOT a DME order):  {EQUIPMENT:001926160}  Other Treatments: ***    Patient's personal belongings (please select all that are sent with patient):  {[facility] DME Belongings:591973338}    RN SIGNATURE:  {Esignature:946380585}    CASE MANAGEMENT/SOCIAL WORK SECTION    Inpatient Status Date: ***    Readmission Risk Assessment Score:  Readmission Risk              Risk of Unplanned Readmission:  12           Discharging to Facility/ Agency   Name:   Address:  Phone:  Fax:    Dialysis Facility (if applicable)   Name:  Address:  Dialysis Schedule:  Phone:  Fax:    / signature: {Esignature:211184918}    PHYSICIAN SECTION    Prognosis: {Prognosis:2744260815}    Condition at Discharge: [facility] Patient Condition:306042030}    Rehab Potential (if transferring to Rehab): {Prognosis:3097954750}    Recommended Labs or Other Treatments After Discharge: ***    Physician Certification: I certify the above information and transfer of Macrina Renee  is necessary for the continuing treatment of the diagnosis listed and that she requires {Admit to Appropriate Level of Care:70158} for {GREATER/LESS:984908478} 30 days.      Update Admission H&P: {CHP DME Changes in AWFNF:225686681}    PHYSICIAN SIGNATURE:  {Esignature:235951899}

## 2022-06-05 LAB
EKG ATRIAL RATE: 70 BPM
EKG DIAGNOSIS: NORMAL
EKG P AXIS: 55 DEGREES
EKG P-R INTERVAL: 156 MS
EKG Q-T INTERVAL: 412 MS
EKG QRS DURATION: 116 MS
EKG QTC CALCULATION (BAZETT): 444 MS
EKG R AXIS: -23 DEGREES
EKG T AXIS: 111 DEGREES
EKG VENTRICULAR RATE: 70 BPM
GLUCOSE BLD-MCNC: 106 MG/DL (ref 70–99)
GLUCOSE BLD-MCNC: 107 MG/DL (ref 70–99)
GLUCOSE BLD-MCNC: 111 MG/DL (ref 70–99)
PERFORMED ON: ABNORMAL

## 2022-06-05 PROCEDURE — 93010 ELECTROCARDIOGRAM REPORT: CPT | Performed by: INTERNAL MEDICINE

## 2022-06-05 PROCEDURE — 2580000003 HC RX 258: Performed by: HOSPITALIST

## 2022-06-05 PROCEDURE — 6370000000 HC RX 637 (ALT 250 FOR IP): Performed by: HOSPITALIST

## 2022-06-05 PROCEDURE — 6370000000 HC RX 637 (ALT 250 FOR IP): Performed by: INTERNAL MEDICINE

## 2022-06-05 PROCEDURE — 93005 ELECTROCARDIOGRAM TRACING: CPT | Performed by: INTERNAL MEDICINE

## 2022-06-05 PROCEDURE — 6370000000 HC RX 637 (ALT 250 FOR IP): Performed by: NURSE PRACTITIONER

## 2022-06-05 PROCEDURE — 99233 SBSQ HOSP IP/OBS HIGH 50: CPT | Performed by: INTERNAL MEDICINE

## 2022-06-05 PROCEDURE — 2060000000 HC ICU INTERMEDIATE R&B

## 2022-06-05 RX ORDER — ESCITALOPRAM OXALATE 10 MG/1
20 TABLET ORAL DAILY
Status: DISCONTINUED | OUTPATIENT
Start: 2022-06-05 | End: 2022-06-06 | Stop reason: HOSPADM

## 2022-06-05 RX ADMIN — PREGABALIN 200 MG: 75 CAPSULE ORAL at 21:20

## 2022-06-05 RX ADMIN — HYDROCODONE BITARTRATE AND ACETAMINOPHEN 1 TABLET: 5; 325 TABLET ORAL at 00:41

## 2022-06-05 RX ADMIN — FAMOTIDINE 20 MG: 20 TABLET ORAL at 21:21

## 2022-06-05 RX ADMIN — FAMOTIDINE 20 MG: 20 TABLET ORAL at 08:31

## 2022-06-05 RX ADMIN — PREGABALIN 100 MG: 75 CAPSULE ORAL at 08:32

## 2022-06-05 RX ADMIN — APIXABAN 5 MG: 5 TABLET, FILM COATED ORAL at 21:21

## 2022-06-05 RX ADMIN — SOTALOL HYDROCHLORIDE 80 MG: 80 TABLET ORAL at 08:31

## 2022-06-05 RX ADMIN — Medication 1 TABLET: at 08:31

## 2022-06-05 RX ADMIN — Medication 10 ML: at 08:33

## 2022-06-05 RX ADMIN — NORTRIPTYLINE HYDROCHLORIDE 100 MG: 25 CAPSULE ORAL at 22:16

## 2022-06-05 RX ADMIN — ESCITALOPRAM OXALATE 20 MG: 10 TABLET ORAL at 16:29

## 2022-06-05 RX ADMIN — FLUTICASONE PROPIONATE 2 SPRAY: 50 SPRAY, METERED NASAL at 08:32

## 2022-06-05 RX ADMIN — CHOLECALCIFEROL TAB 125 MCG (5000 UNIT) 5000 UNITS: 125 TAB at 08:31

## 2022-06-05 RX ADMIN — Medication 10 ML: at 21:22

## 2022-06-05 RX ADMIN — AMLODIPINE BESYLATE 5 MG: 5 TABLET ORAL at 08:31

## 2022-06-05 RX ADMIN — SOTALOL HYDROCHLORIDE 80 MG: 80 TABLET ORAL at 21:20

## 2022-06-05 RX ADMIN — LEVOTHYROXINE SODIUM 225 MCG: 150 TABLET ORAL at 06:23

## 2022-06-05 RX ADMIN — APIXABAN 5 MG: 5 TABLET, FILM COATED ORAL at 08:31

## 2022-06-05 ASSESSMENT — PAIN SCALES - GENERAL
PAINLEVEL_OUTOF10: 0
PAINLEVEL_OUTOF10: 8
PAINLEVEL_OUTOF10: 2

## 2022-06-05 ASSESSMENT — PAIN DESCRIPTION - LOCATION: LOCATION: BACK

## 2022-06-05 NOTE — PROGRESS NOTES
[address]   Progress Note  Cardiology      Tatiana Garcia   Admission date:  6/2/2022  CC-f/up symptomatic PVC  Subjective:  No further v. tach    Objective:  Medications/Labs all Reviewed     escitalopram  20 mg Oral Daily    lidocaine  1 patch TransDERmal Daily    amLODIPine  5 mg Oral Daily    sotalol  80 mg Oral BID    apixaban  5 mg Oral BID    fluticasone  2 spray Nasal Daily    levothyroxine  225 mcg Oral Daily    vitamin D3  5,000 Units Oral Daily    therapeutic multivitamin-minerals  1 tablet Oral Daily    sodium chloride flush  5-40 mL IntraVENous 2 times per day    pregabalin  100 mg Oral Daily    And    pregabalin  200 mg Oral Nightly    famotidine  20 mg Oral BID    nortriptyline  100 mg Oral Nightly       BMP:   Lab Results   Component Value Date     06/04/2022    K 4.3 06/04/2022     06/04/2022    CO2 29 06/04/2022    BUN 15 06/04/2022    CREATININE 0.9 06/04/2022    MG 2.10 06/03/2022     CBC:    Lab Results   Component Value Date    WBC 5.4 06/04/2022    RBC 5.02 06/04/2022    HGB 12.8 06/04/2022    HCT 40.0 06/04/2022    MCV 79.7 06/04/2022    RDW 16.5 06/04/2022     06/04/2022      PT/INR:    Lab Results   Component Value Date    INR 1.90 (H) 02/05/2021    PROTIME 15.2 (H) 02/02/2021     Cardiac Enzymes:    Lab Results   Component Value Date    CKTOTAL 139 08/29/2017     Lab Results   Component Value Date    TROPONINI 0.02 (H) 06/03/2022    TROPONINI 0.03 (H) 06/03/2022    TROPONINI <0.01 06/02/2022     BNP:  No results found for: BNP  FASTING LIPID PANEL:    Lab Results   Component Value Date    CHOL 181 06/04/2022    HDL 44 06/04/2022    HDL 50 08/19/2011    TRIG 115 06/04/2022       Physical Examination:    BP (!) 143/69   Pulse 72   Temp 98.9 °F (37.2 °C) (Axillary)   Resp 16   Ht 5' 8\" (1.727 m)   Wt (!) 312 lb 2.7 oz (141.6 kg)   SpO2 94%   BMI 47.47 kg/m²      Respiratory:  · Resp Assessment: Normal respiratory effort  · Resp Auscultation: Clear to auscultation bilaterally   Cardiovascular:  · Auscultation: regular rate and rhythm, normal J7G8, systolic murmur, rub or gallop  · Palpation:  Nl PMI  · JVP:  normal  · Extremities: No Edema  Abdomen:  · Soft, non-tender  · Normal bowel sounds  Extremities:  ·  No Cyanosis or Clubbing  Neurological/Psychiatric:  · Oriented to time, place, and person  · Non-anxious  Skin Warm and dry  ECG reviewed  Assessment:    Principal Problem:    Chest pain has mild to moderate aortic stenosis with arrythmia seen (Nonsustained v. Tach)    Active Problems:    ELLIE (obstructive sleep apnea)      NSVT (nonsustained ventricular tachycardia) ([facility] [address]  Plan: will start betapace      Plan:  1.  observe on tele while starting betapace  2.  Likely home after am dose of betapace    I have spent  35  minutes of face to face time with the patient with more than 50%  spent  counseling and coordinating care for Velna Fresh[employer]

## 2022-06-05 NOTE — PROGRESS NOTES
[facility]    6/5/2022 1:32 PM        Name: Kings Will . Admitted: 6/2/2022  Primary Care Provider: Jeff Cheung MD (Tel: [phone])      Chief Complaint   Patient presents with    Chest Pain     CP started about 2100 with jaw pain and sweating. pt. arrived by Wi[employer]. pt. pain free. Brief History: Patient is a 73 yo female with hx fibromyalgia, HLD, HTN, hypothyroidism, DM2. She presented to ER with c/o chest and jaw pain associated with shortness of breath, diaphoresis and palpitations. Symptoms come on with exertion and ease with rest. She was pain free by the time she arrived in ER. EKG with nonspecific T wave abnormality. Troponin < 0.01 on presentation, subsequent 0.03. [address] [facility] 6/3 with nonobstructive CAD. Subjective:  Presently resting in bed. States she is feeling much better today. No further chest pain or palpitations since starting sotalol. Spoke with nursing, patient has been up in [place] and did well.       Reviewed interval ancillary notes    Current Medications  HYDROcodone-acetaminophen (NORCO) 5-325 MG per tablet 1 tablet, Q6H PRN  lidocaine 4 % external patch 1 patch, Daily  amLODIPine (NORVASC) tablet 5 mg, Daily  sotalol (BETAPACE) tablet 80 mg, BID  apixaban (ELIQUIS) tablet 5 mg, BID  fluticasone (FLONASE) 50 MCG/ACT nasal spray 2 spray, Daily  levothyroxine (SYNTHROID) tablet 225 mcg, Daily  vitamin D3 (CHOLECALCIFEROL) tablet 5,000 Units, Daily  therapeutic multivitamin-minerals 1 tablet, Daily  sodium chloride flush 0.9 % injection 5-40 mL, 2 times per day  sodium chloride flush 0.9 % injection 5-40 mL, PRN  0.9 % sodium chloride infusion, PRN  ondansetron (ZOFRAN-ODT) disintegrating tablet 4 mg, Q8H PRN   Or  ondansetron (ZOFRAN) injection 4 mg, Q6H PRN  acetaminophen (TYLENOL) tablet 650 mg, Q6H PRN   Or  acetaminophen (TYLENOL) suppository 650 mg, Q6H PRN  polyethylene glycol (GLYCOLAX) packet 17 g, Daily PRN  regadenoson (LEXISCAN) injection 0.4 mg, ONCE PRN  nitroGLYCERIN (NITROSTAT) SL tablet 0.4 mg, Q5 Min PRN  dextrose bolus 10% 125 mL, PRN   Or  dextrose bolus 10% 250 mL, PRN  glucagon (rDNA) injection 1 mg, PRN  dextrose 5 % solution, PRN  hydrALAZINE (APRESOLINE) injection 10 mg, Q6H PRN  pregabalin (LYRICA) capsule 100 mg, Daily   And  pregabalin (LYRICA) capsule 200 mg, Nightly  LORazepam (ATIVAN) tablet 0.5 mg, Q6H PRN  methocarbamol (ROBAXIN) tablet 500 mg, 4x Daily PRN  famotidine (PEPCID) tablet 20 mg, BID  nortriptyline (PAMELOR) capsule 100 mg, Nightly      Objective:  BP (!) 143/69   Pulse 72   Temp 98.9 °F (37.2 °C) (Axillary)   Resp 16   Ht 5' 8\" (1.727 m)   Wt (!) 312 lb 2.7 oz (141.6 kg)   SpO2 94%   BMI 47.47 kg/m²     Intake/Output Summary (Last 24 hours) at 6/5/2022 1332  Last data filed at 6/5/2022 0857  Gross per 24 hour   Intake 840 ml   Output --   Net 840 ml      Wt Readings from Last 3 Encounters:   06/05/22 (!) 312 lb 2.7 oz (141.6 kg)   05/27/22 (!) 324 lb 3.2 oz (147.1 kg)   11/24/21 (!) 319 lb 11.2 oz (145 kg)     General:  Awake, alert, oriented in NAD  Skin:  Warm and dry. No unusual bruising or rash  Neck:  Supple. No JVD appreciated  Chest:  Normal effort.   Clear to auscultation, no wheezes/rhonchi/rales  Cardiovascular:  RRR, normal S1/S2, no murmur/gallop/rub  Abdomen:  Soft, nontender, +bowel sounds  Extremities:  No edema  Neurological: No focal deficits  Psychological: Normal mood and affect        Labs and Tests:  CBC:   Recent Labs     06/02/22  2148 06/04/22  0535   WBC 8.3 5.4   HGB 13.3 12.8    257     BMP:    Recent Labs     06/02/22 2148 06/03/22  0241 06/04/22  0535    135* 140   K 5.2* 4.5 4.3    98* 103   CO2 28 28 29   BUN 19 18 15   CREATININE 1.0 1.0 0.9   GLUCOSE 124* 123* 118*     Hepatic:   Recent Labs     06/02/22 2148 06/03/22  0241 06/04/22  0535   AST 19 13* 12*   ALT 12 10 10   BILITOT <0.2 <0.2 0.4   ALKPHOS 71 68 66       CXR 6/2/2022:  No acute cardiopulmonary process    Echo 6/3/2022:  Summary   -Technically difficult and limited exam due to body habitus.   -Normal overall global LV function with ejection fraction estimated at 55%. -Definity contrast administered.   -Abnormal motion of the distal septum and apex.   -Wall motion assessment was limited due to poor endocardial border definition.   -Moderate aortic stenosis with a peak velocity of 3.43m/s and a mean pressure gradient of 31 mmHg. There is mild aortic insufficiency.   -Thickened mitral valve without evidence of significant stenosis or  regurgitation.   -Grade I diastolic dysfunction with normal LV filling pressures. Avg, E/e'=12.3    [facility] 6/3/3022: Findings:  Artery Findings/Result   LM Normal   LAD Proximal ectasia, 40% distal   Cx Proximal to mid ectasia   RI N/A   RCA Proximal to mid ectasia, 30% distal   LVEDP NA   LVG NA      Intervention:         None     Post Cath Dx:       Nonobstructive CAD      Problem List  Principal Problem:    Chest pain  Active Problems:    ELLIE (obstructive sleep apnea)    NSVT (nonsustained ventricular tachycardia) ([facility] [address]  Resolved Problems:    * No resolved hospital problems. *       Assessment & Plan:   1. Chest pain / elevated troponin. Troponin <0.01->0.03->0. 02. No acute changes on EKG. [facility] (6/3) with non-obstructive CAD. Appeared to be associated with NSVT. No further chest pain since starting sotalol. Cardio on board. Continue asa, statin and beta blocker. 2. Symptomatic NSVT. TSH 1.11, potassium and magnesium WNL. Started on sotalol, QTc stable. Plan is for Holter monitor on DC. If sustained arrhythmia will consider EP study. 3. Aortic stenosis. Echo demonstrates moderate AS with mean gradient 31 mmHg. 4. HTN. Elevated on presentation, improved. Continue amlodipine. 5. PAF. Remains in sinus rhythm. Continue sotalol and apixaban. 6. DM2. A1c 6.1.  Takes metformin at home, held on admission. Has not required insulin coverage. Patient would like to stop fingerstick BG readings. DC low dose correction and POCT glucose. 7. Fibromyalgia / chronic low back pain. Continue pregabalin, lidocaine pain patch, prn robaxin and prn Norco.      8. Hypothyroidism. TSH 1.11. Continue levothyroxine. Diet: ADULT DIET;  Regular  Code:Full Code  DVT PPX: apixaban      [facility], APRN - CNP   6/5/2022 1:32 PM

## 2022-06-05 NOTE — PROGRESS NOTES
[facility]IT VASCULAR ACCESS SERVICES    USG PIV PLACEMENT:  Called to attempt USG PIV d/t unsuccessful attempts by RN staff; pt denies any arm restrictions; +stick attempt x2 w successful USG PIV placement to R cephalic vessel, #90IH 2.61ZD; PIV is patent w brisk blood return and flushes easily without resistance; pt tolerated well; pt is left in a position of comfort w siderails up x2, call light in reach and bed is locked in lowest position; reported successful PIV placement to primary RN CLARENCE Galvez RN, BSN, [facility]3].

## 2022-06-05 NOTE — PLAN OF CARE
Problem: Pain  Goal: Verbalizes/displays adequate comfort level or baseline comfort level  Outcome: Progressing     Problem: Safety - Adult  Goal: Free from fall injury  Outcome: Progressing     Problem: ABCDS Injury Assessment  Goal: Absence of physical injury  Outcome: Progressing     Problem: Gastrointestinal - Adult  Goal: Minimal or absence of nausea and vomiting  Outcome: Progressing     Problem: Infection - Adult  Goal: Absence of infection at discharge  6/5/2022 1025 by Edd Hendrix RN  Outcome: Progressing  6/5/2022 1023 by Edd Hendrix RN  Outcome: Progressing     Problem: Metabolic/Fluid and Electrolytes - Adult  Goal: Electrolytes maintained within normal limits  Outcome: Progressing  Goal: Glucose maintained within prescribed range  Outcome: Progressing     Problem: Chronic Conditions and Co-morbidities  Goal: Patient's chronic conditions and co-morbidity symptoms are monitored and maintained or improved  Outcome: Progressing

## 2022-06-06 VITALS
TEMPERATURE: 98.1 F | BODY MASS INDEX: 44.41 KG/M2 | HEART RATE: 67 BPM | RESPIRATION RATE: 16 BRPM | OXYGEN SATURATION: 94 % | HEIGHT: 68 IN | DIASTOLIC BLOOD PRESSURE: 69 MMHG | WEIGHT: 293 LBS | SYSTOLIC BLOOD PRESSURE: 107 MMHG

## 2022-06-06 DIAGNOSIS — I47.29 NSVT (NONSUSTAINED VENTRICULAR TACHYCARDIA): Primary | ICD-10-CM

## 2022-06-06 LAB
ANION GAP SERPL CALCULATED.3IONS-SCNC: 9 MMOL/L (ref 3–16)
BUN BLDV-MCNC: 19 MG/DL (ref 7–20)
CALCIUM SERPL-MCNC: 9 MG/DL (ref 8.3–10.6)
CHLORIDE BLD-SCNC: 104 MMOL/L (ref 99–110)
CO2: 27 MMOL/L (ref 21–32)
CREAT SERPL-MCNC: 0.9 MG/DL (ref 0.6–1.2)
GFR AFRICAN AMERICAN: >60
GFR NON-AFRICAN AMERICAN: >60
GLUCOSE BLD-MCNC: 101 MG/DL (ref 70–99)
GLUCOSE BLD-MCNC: 113 MG/DL (ref 70–99)
GLUCOSE BLD-MCNC: 93 MG/DL (ref 70–99)
HCT VFR BLD CALC: 41.9 % (ref 36–48)
HEMOGLOBIN: 13.4 G/DL (ref 12–16)
MCH RBC QN AUTO: 25.7 PG (ref 26–34)
MCHC RBC AUTO-ENTMCNC: 31.9 G/DL (ref 31–36)
MCV RBC AUTO: 80.7 FL (ref 80–100)
PDW BLD-RTO: 16.3 % (ref 12.4–15.4)
PERFORMED ON: ABNORMAL
PERFORMED ON: NORMAL
PLATELET # BLD: 262 K/UL (ref 135–450)
PMV BLD AUTO: 7.8 FL (ref 5–10.5)
POTASSIUM SERPL-SCNC: 4.9 MMOL/L (ref 3.5–5.1)
RBC # BLD: 5.2 M/UL (ref 4–5.2)
SODIUM BLD-SCNC: 140 MMOL/L (ref 136–145)
WBC # BLD: 7.6 K/UL (ref 4–11)

## 2022-06-06 PROCEDURE — 80048 BASIC METABOLIC PNL TOTAL CA: CPT

## 2022-06-06 PROCEDURE — 93005 ELECTROCARDIOGRAM TRACING: CPT | Performed by: INTERNAL MEDICINE

## 2022-06-06 PROCEDURE — 2580000003 HC RX 258: Performed by: HOSPITALIST

## 2022-06-06 PROCEDURE — 97530 THERAPEUTIC ACTIVITIES: CPT

## 2022-06-06 PROCEDURE — 99232 SBSQ HOSP IP/OBS MODERATE 35: CPT | Performed by: INTERNAL MEDICINE

## 2022-06-06 PROCEDURE — 6370000000 HC RX 637 (ALT 250 FOR IP): Performed by: HOSPITALIST

## 2022-06-06 PROCEDURE — 85027 COMPLETE CBC AUTOMATED: CPT

## 2022-06-06 PROCEDURE — 97161 PT EVAL LOW COMPLEX 20 MIN: CPT

## 2022-06-06 PROCEDURE — 99233 SBSQ HOSP IP/OBS HIGH 50: CPT | Performed by: NURSE PRACTITIONER

## 2022-06-06 PROCEDURE — 97165 OT EVAL LOW COMPLEX 30 MIN: CPT

## 2022-06-06 PROCEDURE — 36415 COLL VENOUS BLD VENIPUNCTURE: CPT

## 2022-06-06 PROCEDURE — 6370000000 HC RX 637 (ALT 250 FOR IP): Performed by: NURSE PRACTITIONER

## 2022-06-06 PROCEDURE — 6370000000 HC RX 637 (ALT 250 FOR IP): Performed by: INTERNAL MEDICINE

## 2022-06-06 RX ORDER — ESCITALOPRAM OXALATE 20 MG/1
20 TABLET ORAL NIGHTLY
Qty: 30 TABLET | Refills: 3 | COMMUNITY
Start: 2022-06-06

## 2022-06-06 RX ORDER — PREGABALIN 50 MG/1
50 CAPSULE ORAL 3 TIMES DAILY
COMMUNITY
Start: 2022-06-06

## 2022-06-06 RX ORDER — SOTALOL HYDROCHLORIDE 80 MG/1
80 TABLET ORAL 2 TIMES DAILY
Qty: 60 TABLET | Refills: 1 | Status: SHIPPED | OUTPATIENT
Start: 2022-06-06

## 2022-06-06 RX ORDER — AMLODIPINE BESYLATE 5 MG/1
5 TABLET ORAL DAILY
Qty: 30 TABLET | Refills: 2 | Status: SHIPPED | OUTPATIENT
Start: 2022-06-06 | End: 2022-09-12 | Stop reason: SDUPTHER

## 2022-06-06 RX ORDER — EZETIMIBE 10 MG/1
10 TABLET ORAL NIGHTLY
Status: DISCONTINUED | OUTPATIENT
Start: 2022-06-06 | End: 2022-06-06 | Stop reason: HOSPADM

## 2022-06-06 RX ORDER — LIDOCAINE 4 G/G
1 PATCH TOPICAL DAILY
Qty: 1 BOX | Refills: 1 | Status: SHIPPED | OUTPATIENT
Start: 2022-06-07

## 2022-06-06 RX ORDER — FAMOTIDINE 20 MG/1
20 TABLET, FILM COATED ORAL 2 TIMES DAILY PRN
COMMUNITY
Start: 2022-06-06

## 2022-06-06 RX ORDER — LEVOTHYROXINE SODIUM 0.03 MG/1
TABLET ORAL
Qty: 90 TABLET | Refills: 0 | Status: SHIPPED | OUTPATIENT
Start: 2022-06-06

## 2022-06-06 RX ADMIN — PREGABALIN 100 MG: 75 CAPSULE ORAL at 08:37

## 2022-06-06 RX ADMIN — AMLODIPINE BESYLATE 5 MG: 5 TABLET ORAL at 08:28

## 2022-06-06 RX ADMIN — FAMOTIDINE 20 MG: 20 TABLET ORAL at 08:29

## 2022-06-06 RX ADMIN — Medication 1 TABLET: at 08:30

## 2022-06-06 RX ADMIN — Medication 10 ML: at 08:30

## 2022-06-06 RX ADMIN — LEVOTHYROXINE SODIUM 225 MCG: 150 TABLET ORAL at 06:40

## 2022-06-06 RX ADMIN — CHOLECALCIFEROL TAB 125 MCG (5000 UNIT) 5000 UNITS: 125 TAB at 08:28

## 2022-06-06 RX ADMIN — APIXABAN 5 MG: 5 TABLET, FILM COATED ORAL at 08:29

## 2022-06-06 RX ADMIN — ESCITALOPRAM OXALATE 20 MG: 10 TABLET ORAL at 08:29

## 2022-06-06 RX ADMIN — SOTALOL HYDROCHLORIDE 80 MG: 80 TABLET ORAL at 08:28

## 2022-06-06 RX ADMIN — ACETAMINOPHEN 650 MG: 325 TABLET ORAL at 08:37

## 2022-06-06 ASSESSMENT — PAIN DESCRIPTION - ORIENTATION: ORIENTATION: MID

## 2022-06-06 ASSESSMENT — PAIN DESCRIPTION - DESCRIPTORS: DESCRIPTORS: ACHING

## 2022-06-06 ASSESSMENT — PAIN SCALES - GENERAL: PAINLEVEL_OUTOF10: 3

## 2022-06-06 ASSESSMENT — PAIN DESCRIPTION - LOCATION: LOCATION: HEAD

## 2022-06-06 NOTE — PROGRESS NOTES
Discharge instructions, medications and follow up appointments reviewed with pt. Pt denies any questions at this time. IV removed intact with no complications and dry dressing applied. All pt belongings gathered and put into bags. Friend picking pt up.

## 2022-06-06 NOTE — PROGRESS NOTES
[address]   Progress notes  [phone]      Chief Complaint   Patient presents with    Chest Pain     CP started about 2100 with jaw pain and sweating. pt. arrived by [facility]. pt. pain free. History of Present Illness:  Luis Lowe is a 74 y.o. patient who presented to the hospital with complaints of chest pain. I have been asked to provide consultation regarding further management and testing. Subjective: no acute events overnight. No chest pain or pressure. No nausea or vomiting. She ambulated in the halls without dizziness. Telemetry reviewed    Past Medical History:   has a past medical history of Aortic stenosis, Arthritis, Coronary artery disease, Depression, Fatigue, Fibromyalgia, Hyperlipidemia, Hypertension, Hypothyroidism, Pancreatitis, and Type II or unspecified type diabetes mellitus without mention of complication, not stated as uncontrolled. Surgical History:   has a past surgical history that includes  section; Hysterectomy; Ankle fracture surgery (); lymph node biopsy (); Colonoscopy; Endoscopy, colon, diagnostic; and Cholecystectomy, laparoscopic (2014). Social History:   reports that she quit smoking about 12 years ago. She has never used smokeless tobacco. She reports that she does not drink alcohol and does not use drugs. Family History:  No family history of premature coronary artery disease, aortic disease, or valve disease. Home Medications:  Were reviewed and are listed in nursing record. and/or listed below  Prior to Admission medications    Medication Sig Start Date End Date Taking?  Authorizing Provider   propranolol (INDERAL) 60 MG tablet Take 60 mg by mouth nightly   Yes Historical Provider, MD   nortriptyline (PAMELOR) 75 MG capsule 100 mg nightly 2 50 mg caps at bed time per pt 22   Historical Provider, MD CARNEY 5 MG TABS tablet Take 1 tablet by mouth 2 times daily 21   Tona Clark MD   metFORMIN (GLUCOPHAGE) 500 MG tablet Take 1 tablet by mouth 2 times daily (with meals) 2/25/21   AQUILES Saleh CNP   amLODIPine (NORVASC) 10 MG tablet Take 0.5 tablets by mouth daily  Patient not taking: Reported on 6/3/2022 2/25/21   AQUILES Saleh CNP   LORazepam (ATIVAN) 0.5 MG tablet Take 0.5 mg by mouth every 6 hours as needed for Anxiety. Historical Provider, MD   HYDROcodone-acetaminophen (NORCO) 5-325 MG per tablet Take 1 tablet by mouth every 6 hours as needed for Pain. Historical Provider, MD   Escitalopram Oxalate (LEXAPRO PO) Take by mouth nightly Unsure of mg amount    Historical Provider, MD   Misc. Devices Vibhaaminta Linton) MISC Use with walking or standing at all times to prevent falls. 12/31/20   MAREN Kellogg   pregabalin (LYRICA) 50 MG capsule Take 1 capsule by mouth 2 times daily for 31 days. .  Patient taking differently: Take 50 mg by mouth 3 times daily.  Once during the day and two at night per pt 9/4/18 12/31/20  AQUILES Pineda CNP   levothyroxine (SYNTHROID) 25 MCG tablet TAKE ONE TABLET BY MOUTH DAILY ALONG WITH 200 MCG TABLET (225 MCG TOTAL)  Patient taking differently: 50 mcg Daily Patient states she takes 150 mcgs currently 8/1/18   AQUILES Ramsey CNP   methocarbamol (ROBAXIN) 500 MG tablet TAKE 1 TABLET BY MOUTH 4 TIMES DAILY AS NEEDED (PAIN) 4/26/18   Romina Silva MD   omeprazole (PRILOSEC) 40 MG delayed release capsule TAKE ONE CAPSULE BY MOUTH ONE TIME DAILY  Patient not taking: Reported on 6/3/2022 4/16/18   Romina Silva MD   famotidine (PEPCID) 20 MG tablet Take 1 tablet by mouth 2 times daily  Patient taking differently: Take 20 mg by mouth 2 times daily as needed  12/21/17   Romina Silva MD   fluticasone [facility]) 50 MCG/ACT nasal spray 2 sprays by Nasal route daily 11/24/17   Romina Silva MD   promethazine (PHENERGAN) 25 MG tablet Take 1 tablet by mouth every 6 hours as needed for Nausea  Patient not taking: Arie Lea MD   1 tablet at 06/05/22 0831    sodium chloride flush 0.9 % injection 5-40 mL  5-40 mL IntraVENous 2 times per day Arie Lea MD   10 mL at 06/05/22 2122    sodium chloride flush 0.9 % injection 5-40 mL  5-40 mL IntraVENous PRN Ras Winters MD        0.9 % sodium chloride infusion   IntraVENous PRN Arie Lea MD        ondansetron (ZOFRAN-ODT) disintegrating tablet 4 mg  4 mg Oral Q8H PRN Ras Winters MD        Or    ondansetron (ZOFRAN) injection 4 mg  4 mg IntraVENous Q6H PRN Arie Lea MD        acetaminophen (TYLENOL) tablet 650 mg  650 mg Oral Q6H PRN Arie Lea MD   650 mg at 06/04/22 0710    Or    acetaminophen (TYLENOL) suppository 650 mg  650 mg Rectal Q6H PRN Arie Lea MD        polyethylene glycol (GLYCOLAX) packet 17 g  17 g Oral Daily PRN Arie Lea MD        regadenoson (LEXISCAN) injection 0.4 mg  0.4 mg IntraVENous ONCE PRN Arie Lea MD        nitroGLYCERIN (NITROSTAT) SL tablet 0.4 mg  0.4 mg SubLINGual Q5 Min PRN Ras Winters MD        dextrose bolus 10% 125 mL  125 mL IntraVENous PRN Ras Winters MD        Or    dextrose bolus 10% 250 mL  250 mL IntraVENous PRN Arie Lea MD        glucagon (rDNA) injection 1 mg  1 mg IntraMUSCular PRN Ras Winters MD        dextrose 5 % solution  100 mL/hr IntraVENous PRN Arie Lea MD        hydrALAZINE (APRESOLINE) injection 10 mg  10 mg IntraVENous Q6H PRN Ras Winters MD        pregabalin (LYRICA) capsule 100 mg  100 mg Oral Daily Ras Winters MD   100 mg at 06/05/22 1553    And    pregabalin (LYRICA) capsule 200 mg  200 mg Oral Nightly Ras Winters MD   200 mg at 06/05/22 2120    LORazepam (ATIVAN) tablet 0.5 mg  0.5 mg Oral Q6H PRN Jesica Suarezt APRN - CNP        methocarbamol (ROBAXIN) tablet 500 mg  500 mg Oral 4x Daily PRN AQUILES Marie CNP   500 mg at 06/04/22 0756    famotidine (PEPCID) tablet 20 mg  20 mg Oral BID AQUILES Rios CNP   20 mg at 06/05/22 2121    nortriptyline (PAMELOR) capsule 100 mg  100 mg Oral Nightly Ras Winters MD   100 mg at 06/05/22 2216        Allergies:  Cephalosporins, Dilaudid [hydromorphone hcl], Fortaz [ceftazidime], and Statins     Review of Systems:     · Constitutional: there has been no unanticipated weight loss. There's been no change in energy level, sleep pattern, or activity level. · Eyes: No visual changes or diplopia. No scleral icterus. · ENT: No Headaches, hearing loss or vertigo. No mouth sores or sore throat. · Cardiovascular: Reviewed in HPI  · Respiratory: No cough or wheezing, no sputum production. No hematemesis. · Gastrointestinal: No abdominal pain, appetite loss, blood in stools. No change in bowel or bladder habits. · Genitourinary: No dysuria, trouble voiding, or hematuria. · Musculoskeletal:  No gait disturbance, weakness or joint complaints. · Integumentary: No rash or pruritis. · Neurological: No headache, diplopia, change in muscle strength, numbness or tingling. No change in gait, balance, coordination, mood, affect, memory, mentation, behavior. · Psychiatric: No anxiety, no depression. · Endocrine: No malaise, fatigue or temperature intolerance. No excessive thirst, fluid intake, or urination. No tremor. · Hematologic/Lymphatic: No abnormal bruising or bleeding, blood clots or swollen lymph nodes. · Allergic/Immunologic: No nasal congestion or hives.   ·     Physical Examination:    Vitals:    06/06/22 0351   BP: (!) 155/85   Pulse: 59   Resp:    Temp:    SpO2: 93%    Weight: (!) 311 lb (141.1 kg)         General Appearance:  Alert, cooperative, no distress, appears stated age   Head:  Normocephalic, without obvious abnormality, atraumatic   Eyes:  PERRL, conjunctiva/corneas clear       Nose: Nares normal, no drainage or sinus tenderness   Throat: Lips, mucosa, and tongue normal   Neck: Supple, symmetrical, trachea midline, no adenopathy, thyroid: not enlarged, symmetric, no tenderness/mass/nodules, no carotid bruit or JVD       Lungs:   Clear to auscultation bilaterally, respirations unlabored   Chest Wall:  No tenderness or deformity   Heart:  Regular rate and rhythm, S1, S2 normal, 2/6 systolic murmur   Abdomen:   Soft, non-tender, bowel sounds active all four quadrants,  no masses, no organomegaly           Extremities: Extremities normal, atraumatic, no cyanosis or edema   Pulses: 2+ and symmetric   Skin: Skin color, texture, turgor normal, no rashes or lesions   Pysch: Normal mood and affect   Neurologic: Normal gross motor and sensory exam.         Labs  CBC:   Lab Results   Component Value Date    WBC 7.6 06/06/2022    RBC 5.20 06/06/2022    HGB 13.4 06/06/2022    HCT 41.9 06/06/2022    MCV 80.7 06/06/2022    RDW 16.3 06/06/2022     06/06/2022     CMP:    Lab Results   Component Value Date     06/06/2022    K 4.9 06/06/2022    K 4.3 06/04/2022     06/06/2022    CO2 27 06/06/2022    BUN 19 06/06/2022    CREATININE 0.9 06/06/2022    GFRAA >60 06/06/2022    GFRAA >60 02/13/2013    AGRATIO 1.4 06/04/2022    LABGLOM >60 06/06/2022    GLUCOSE 113 06/06/2022    PROT 6.6 06/04/2022    PROT 7.1 02/13/2013    CALCIUM 9.0 06/06/2022    BILITOT 0.4 06/04/2022    ALKPHOS 66 06/04/2022    AST 12 06/04/2022    ALT 10 06/04/2022     PT/INR:  No results found for: PTINR  Lab Results   Component Value Date    CKTOTAL 139 08/29/2017    TROPONINI 0.02 (H) 06/03/2022       EKG:  I have reviewed EKG with the following interpretation:  Impression:  Sinus rhythm, left axis, anterior t wave inversion, qtc 444    Echo:  -Technically difficult and limited exam due to body habitus.   -Normal overall global LV function with ejection fraction estimated at 55%.    -Definity contrast administered.   -Abnormal motion of the distal septum and apex.   -Wall motion assessment was limited due to poor endocardial border   definition.   -Moderate aortic stenosis with a peak velocity of 3.43m/s and a mean   pressure gradient of 31 mmHg. There is mild aortic insufficiency.   -Thickened mitral valve without evidence of significant stenosis or   regurgitation.   -Grade I diastolic dysfunction with normal LV filling pressures. Avg,   E/e'=12.3  Stress: Impression: no reversible ischemia  Cath:   LM Normal   LAD Proximal ectasia, 40% distal   Cx Proximal to mid ectasia   RI N/A   RCA Proximal to mid ectasia, 30% distal   LVEDP NA   LVG NA         Old notes reviewed  Telemetry reviewd  Ekg personally reviewed  Chest xray personally reviewed  Echo, cath, and   Medications and labs reviewed  moderate complexity/medical decision making due to extensive data review, extensive history review, independent review of data  moderate risk due to acute illness, evaluation of drug-drug interactions, medication management and diagnostic interventions        Assessment  Patient Active Problem List   Diagnosis    Recurrent major depressive disorder, in full remission ([address]    Fatigue    Hyperglycemia    Hyperlipidemia    Aortic stenosis    Ankle edema    Hypothyroidism    HTN (hypertension)    Fibromyalgia    Myofascial pain syndrome    DDD (degenerative disc disease), lumbosacral    Acute renal failure (HCC)    Bradycardia    Pancreatitis    Biliary dyskinesia    Syncope, near    Injury of left wrist    Obesity, morbid, BMI 40.0-49.9 ([address]    Morbid obesity due to excess calories ([address]    Chest pain    ELLIE (obstructive sleep apnea)    NSVT (nonsustained ventricular tachycardia) ([address]         Plan:    I had the opportunity to review the clinical symptoms and presentation of Tj Blake. Assessment/Plan:  1.  Chest pain and elevated troponin  - new problem  - cath without obstructive disease, likely secondary to hypertensive urgency and spasm/microvascular disease  Plan:  - started amlodipine  - patient with reported intolerances to statins, start zetia. If no improvement pcsk9 inhibitor   - prn nitroglycerin at discharge    3. NSVT  - stable  Plan:  - sotalol started per EP, repeat ekg  - holter monitor at discharge    4. Moderate aortic stenosis  -stable  Plan:  - repeat echo in 1 year    5. Paroxsymal atrial fibrillation   - stable  Plan:  - continue apixiban     All questions and concerns were addressed to the patient/family. Alternatives to my treatment were discussed. The note was completed using EMR. Every effort was made to ensure accuracy; however, inadvertent computerized transcription errors may be present.   Jenna Morales,  6/6/2022 7:49 AM

## 2022-06-06 NOTE — PROGRESS NOTES
[address]   Electrophysiology Progress Note   Date: 6/6/2022  Admit Date: 6/2/2022     Reason for follow up: NSVT, PAF    Chief Complaint:   Chief Complaint   Patient presents with    Chest Pain     CP started about 2100 with jaw pain and sweating. pt. arrived by [employer]. pt. pain free. History of Present Illness: History obtained from patient and medical record. Eleuterio Quintero is a 74 y.o. female with a past medical history of fibromyalgia, hypertension, moderate AS, PAF. Presented to the ER 12/31/2020 with SOB and noted to have AF. S/p DCCV 2/5/2021. Follows with Dr. Kevon Talavera at [facility]. S/p cardiac catheterization which showed ectatic coronary artery disease with no obvious obstructive lesion. No intervention has been done. Interval Hx: Today, she is being seen for follow up SR without ectopy on telemetry. No significant bradycardia. Reports that she is feeling better. No new complaints today. No major events overnight. Denies having chest pain, palpitations, shortness of breath, or dizziness. Patient seen and examined. Clinical notes reviewed. Telemetry reviewed.     Assessment and Plan:  NSVT   - Has been started on sotalol   - No recurrence on telemetry   - Has been started on sotalol by Dr. Giulia Warren, QTc stable on ECG after 4 doses   - Plans for OP cardiac monitor at discharge with EP follow up, she follows at [facility]    - Denies past or present hx of syncope or dizziness  PAF   - None on telemetry   - Continue Eliquis   - Has been started on sotalol   - Monitor burden on monitor   Bradycardia   - Hx of bradycardia   - SR/SB on telemetry, will monitor on her MCT and also patient will monitor her HR at home daily   - She has been walking the halls and feels well, denies lightheadedness or dizziness  CAD   - Non-obstrcutive per cath   - Hold on BB for now in setting of sotalol use, can consider adding or transitioning to BB as OP  Obesity: Body mass index is 47.29 kg/m².   - Excessive weight is complicating assessment treatment. It is placing patient at risk for multiple co-morbidities as well as early death contributing to the patient's presentations. - Discussed weight loss and lifestyle modifications. Suspected ELLIE   - Needs OP seep study  Essential Tremors   - On propranolol PTA, she is bradycardic therefore would continue to hold for now    - Event monitor at discharge  - Repeat ECG today   - Likely acceptable for discharge later today if ECG remains stable    Discussed case and medication therapy with Dr. Missy Lorenzosin. At this point he would continue with sotalol as she has been loaded with it and will treat PAF with hx of CAD. May consider adjusting dose or changing therapy as OP. She prefers to follow at [facility], will arrange for her to establish with EP physician there. Multiple medical conditions with risk of decompensation. Problem List:   Patient Active Problem List    Diagnosis Date Noted    Chest pain 06/03/2022    ELLIE (obstructive sleep apnea)     NSVT (nonsustained ventricular tachycardia) ([address]     Morbid obesity due to excess calories ([address] 11/24/2017    Obesity, morbid, BMI 40.0-49.9 ([address] 11/05/2015    Injury of left wrist 10/28/2015    Syncope, near 10/21/2015    Biliary dyskinesia 06/02/2014    Pancreatitis 05/16/2014    Acute renal failure ([address] 10/03/2013    Bradycardia 10/03/2013    Myofascial pain syndrome 11/05/2012    DDD (degenerative disc disease), lumbosacral 11/05/2012    Recurrent major depressive disorder, in full remission ([address] 08/26/2010    Fatigue 08/26/2010    Hyperglycemia 08/26/2010    Hyperlipidemia 08/26/2010    Aortic stenosis 08/26/2010    Ankle edema 08/26/2010    Hypothyroidism 08/26/2010    HTN (hypertension) 08/26/2010    Fibromyalgia 08/26/2010      Allergies:   Allergies   Allergen Reactions    Cephalosporins      Cough and congestion    Dilaudid [Hydromorphone Hcl] Itching     Nervous, unable to sleep   [place] [Ceftazidime]      Cough and congestion    Statins Other (See Comments)     Muscle cramping/weakness     Home Meds:  Prior to Visit Medications    Medication Sig Taking? Authorizing Provider   propranolol (INDERAL) 60 MG tablet Take 60 mg by mouth nightly Yes Historical Provider, MD   nortriptyline (PAMELOR) 75 MG capsule 100 mg nightly 2 50 mg caps at bed time per pt  Historical Provider, MD   ELIQUIS 5 MG TABS tablet Take 1 tablet by mouth 2 times daily  Tara Avila MD   metFORMIN (GLUCOPHAGE) 500 MG tablet Take 1 tablet by mouth 2 times daily (with meals)  AQUILES Guerrero CNP   amLODIPine (NORVASC) 10 MG tablet Take 0.5 tablets by mouth daily  Patient not taking: Reported on 6/3/2022  AQUILES Guerrero CNP   LORazepam (ATIVAN) 0.5 MG tablet Take 0.5 mg by mouth every 6 hours as needed for Anxiety. Historical Provider, MD   HYDROcodone-acetaminophen (NORCO) 5-325 MG per tablet Take 1 tablet by mouth every 6 hours as needed for Pain. Historical Provider, MD   Escitalopram Oxalate (LEXAPRO PO) Take by mouth nightly Unsure of mg amount  Historical Provider, MD   Misc. Devices Verlene Stager) MISC Use with walking or standing at all times to prevent falls. MAREN Elizondo   pregabalin (LYRICA) 50 MG capsule Take 1 capsule by mouth 2 times daily for 31 days. .  Patient taking differently: Take 50 mg by mouth 3 times daily.  Once during the day and two at night per pt  AQUILES Pearson CNP   levothyroxine (SYNTHROID) 25 MCG tablet TAKE ONE TABLET BY MOUTH DAILY ALONG WITH 200 MCG TABLET (225 MCG TOTAL)  Patient taking differently: 50 mcg Daily Patient states she takes 150 mcgs currently  Noel SpineAQUILES CNP   methocarbamol (ROBAXIN) 500 MG tablet TAKE 1 TABLET BY MOUTH 4 TIMES DAILY AS NEEDED (PAIN)  Shweta Morales MD   omeprazole (PRILOSEC) 40 MG delayed release capsule TAKE ONE CAPSULE BY MOUTH ONE TIME DAILY  Patient not taking: Reported on 6/3/2022 Chase Khan MD   famotidine (PEPCID) 20 MG tablet Take 1 tablet by mouth 2 times daily  Patient taking differently: Take 20 mg by mouth 2 times daily as needed   Chase Khan MD   fluticasone (FLONASE) 50 MCG/ACT nasal spray 2 sprays by Nasal route daily  Chase Khan MD   promethazine (PHENERGAN) 25 MG tablet Take 1 tablet by mouth every 6 hours as needed for Nausea  Patient not taking: Reported on 6/3/2022  Chase Khan MD   vitamin D (CHOLECALCIFEROL) 5000 units CAPS capsule Take 5,000 Units by mouth daily  Historical Provider, MD   Coenzyme Q10 (COQ-10 PO) Take by mouth daily  Historical Provider, MD   Calcium-Magnesium-Vitamin D 185- MG-MG-UNIT CAPS Take 1 capsule by mouth daily  Historical Provider, MD   acetaminophen (TYLENOL) 325 MG tablet Take 650 mg by mouth every 6 hours as needed for Pain  Historical Provider, MD   therapeutic multivitamin-minerals (THERAGRAN-M) tablet Take 1 tablet by mouth daily. Historical Provider, MD   Omega-3 Fatty Acids (FISH OIL) 1000 MG CAPS Take 1,000 mg by mouth daily.   Historical Provider, MD      Scheduled Meds:   escitalopram  20 mg Oral Daily    lidocaine  1 patch TransDERmal Daily    amLODIPine  5 mg Oral Daily    sotalol  80 mg Oral BID    apixaban  5 mg Oral BID    fluticasone  2 spray Nasal Daily    levothyroxine  225 mcg Oral Daily    vitamin D3  5,000 Units Oral Daily    therapeutic multivitamin-minerals  1 tablet Oral Daily    sodium chloride flush  5-40 mL IntraVENous 2 times per day    pregabalin  100 mg Oral Daily    And    pregabalin  200 mg Oral Nightly    famotidine  20 mg Oral BID    nortriptyline  100 mg Oral Nightly     Continuous Infusions:   sodium chloride      dextrose       PRN Meds:HYDROcodone 5 mg - acetaminophen, sodium chloride flush, sodium chloride, ondansetron **OR** ondansetron, acetaminophen **OR** acetaminophen, polyethylene glycol, regadenoson, nitroGLYCERIN, dextrose bolus **OR** dextrose bolus, glucagon (rDNA), dextrose, hydrALAZINE, LORazepam, methocarbamol   Past Medical History:  Past Medical History:   Diagnosis Date    Aortic stenosis     Arthritis     Coronary artery disease     Depression     Fatigue     Fibromyalgia     Hyperlipidemia     Hypertension     Hypothyroidism     Pancreatitis     Type II or unspecified type diabetes mellitus without mention of complication, not stated as uncontrolled         Past Surgical History:    has a past surgical history that includes  section; Hysterectomy; Ankle fracture surgery (); lymph node biopsy (); Colonoscopy; Endoscopy, colon, diagnostic; and Cholecystectomy, laparoscopic (2014). Social History:  Reviewed. reports that she quit smoking about 12 years ago. She has never used smokeless tobacco. She reports that she does not drink alcohol and does not use drugs. Family History:  Reviewed. family history includes Diabetes in her mother; Heart Disease in her brother; Hypertension in her mother; Stroke in her father. Denies family history of sudden cardiac death, arrhythmia, premature CAD    Review of Systems:  · Constitutional: Negative for fever, weight changes, or weakness  · Skin: Negative for bruising, bleeding, blood clots, or changes in skin pigment  · HEENT: Negative for vision changes or dysphagia  · Respiratory: Reviewed in HPI  · Cardiovascular: Reviewed in HPI  · Gastrointestinal: Negative for abdominal pain, N/V/D, constipation, or black/tarry stools  · Genito-Urinary: Negative for hematuria  · Musculoskeletal: No focal weakness  · Neurological/Psych: Negative for confusion or TIA-like symptoms.  No anxiety, depression, or insomnia    Physical Examination:  Vitals:    22 0824   BP: (!) 113/57   Pulse: 66   Resp: 16   Temp: 97.4 °F (36.3 °C)   SpO2: 95%      In: 840 [P.O.:840]  Out: -    Wt Readings from Last 3 Encounters:   22 (!) 311 lb (141.1 kg)   22 (!) 324 lb 3.2 oz (147.1 kg)   11/24/21 (!) 319 lb 11.2 oz (145 kg)       Intake/Output Summary (Last 24 hours) at 6/6/2022 1046  Last data filed at 6/6/2022 0804  Gross per 24 hour   Intake 720 ml   Output --   Net 720 ml     Telemetry: Personally Reviewed Sinus bradycardia  · Constitutional: Cooperative and in no apparent distress, and appears well nourished  · Skin: Warm and pink; no cyanosis, bruising, or clubbing  · HEENT: Symmetric and normocephalic. Conjunctiva pink with clear sclera. Mucus membranes pink and moist.   · Cardiovascular: regular and rhythm. S1 & S2, positive for murmurs. Peripheral pulses 2+, capillary refill < 3 seconds. negative elevation of JVP. · Respiratory: Respirations symmetric and unlabored. Lungs clear to auscultation bilaterally, no wheezing, crackles, or rhonchi  · Gastrointestinal: Abdomen soft and round. Bowel sounds normoactive in all quadrants. · Musculoskeletal: No focal weakness. · Neurologic/Psych: Awake and orientated to person, place and time. Calm affect, appropriate mood    Pertinent labs, diagnostic, device, and imaging results reviewed as a part of this visit    Labs:    BMP:   Recent Labs     06/03/22  1350 06/04/22  0535 06/06/22  0524   NA  --  140 140   K  --  4.3 4.9   CL  --  103 104   CO2  --  29 27   BUN  --  15 19   CREATININE  --  0.9 0.9   MG 2.10  --   --      Estimated Creatinine Clearance: 90 mL/min (based on SCr of 0.9 mg/dL).    CBC:   Recent Labs     06/04/22  0535 06/06/22  0524   WBC 5.4 7.6   HGB 12.8 13.4   HCT 40.0 41.9   MCV 79.7* 80.7    262     Thyroid:   Lab Results   Component Value Date    .00 12/31/2020     Lipids:   Lab Results   Component Value Date    CHOL 181 06/04/2022    HDL 44 06/04/2022    HDL 50 08/19/2011    TRIG 115 06/04/2022     LFTS:   Lab Results   Component Value Date    ALT 10 06/04/2022    AST 12 06/04/2022    ALKPHOS 66 06/04/2022    PROT 6.6 06/04/2022    PROT 7.1 02/13/2013    AGRATIO 1.4 06/04/2022    BILITOT 0.4 06/04/2022 Cardiac Enzymes:   Lab Results   Component Value Date    CKTOTAL 139 2017    TROPONINI 0.02 2022    TROPONINI 0.03 2022    TROPONINI <0.01 2022     Coags:   Lab Results   Component Value Date    PROTIME 15.2 2021    INR 1.90 2021     EC2022: SB    ECHO:  6/3/2022  Summary   -Technically difficult and limited exam due to body habitus.   -Normal overall global LV function with ejection fraction estimated at 55%. -Definity contrast administered.   -Abnormal motion of the distal septum and apex.   -Wall motion assessment was limited due to poor endocardial border   definition.   -Moderate aortic stenosis with a peak velocity of 3.43m/s and a mean   pressure gradient of 31 mmHg. There is mild aortic insufficiency.   -Thickened mitral valve without evidence of significant stenosis or   regurgitation.   -Grade I diastolic dysfunction with normal LV filling pressures. Avg,   E/e'=12.3     Cath:  Findings:  Artery Findings/Result   LM Normal   LAD Proximal ectasia, 40% distal   Cx Proximal to mid ectasia   RI N/A   RCA Proximal to mid ectasia, 30% distal   LVEDP NA   LVG NA      Intervention:         None     Post Cath Dx:       Nonobstructive CAD    All questions and concerns were addressed to the patient. Alternatives to my treatment were discussed. I have discussed the above stated plan with patient and the nurse. The patient verbalized understanding and agreed with the plan. Thank you for allowing to us to participate in the care of Rick Bowens.     AQUILES Kovacs-CNP  [address]   Office: [phone]

## 2022-06-06 NOTE — PROGRESS NOTES
Cardiology RN to pt room with ordered MCT monitor. Monitor reviewed with pt. All questions answered. Cardiology RN placed monitor on pt. Pt verbalized understanding.       Isabela Zambrano RN

## 2022-06-06 NOTE — PROGRESS NOTES
[facility] Department   Phone: [phone]    Physical Therapy    [x] Initial Evaluation            [] Daily Treatment Note         [x] Discharge Summary      Patient: Macrina Renee   : 1955   MRN: [id number]   Date of Service:  2022  Admitting Diagnosis: Chest pain  Current Admission Summary: Patient is a 75 yo female with hx fibromyalgia, HLD, HTN, hypothyroidism, DM2. She presented to ER with c/o chest and jaw pain associated with shortness of breath, diaphoresis and palpitations. Symptoms come on with exertion and ease with rest. She was pain free by the time she arrived in ER. EKG with nonspecific T wave abnormality.  Troponin < 0.01 on presentation, subsequent 0.03. [facility] 6/3 with nonobstructive CAD.    Past Medical History:  has a past medical history of Aortic stenosis, Arthritis, Coronary artery disease, Depression, Fatigue, Fibromyalgia, Hyperlipidemia, Hypertension, Hypothyroidism, Pancreatitis, and Type II or unspecified type diabetes mellitus without mention of complication, not stated as uncontrolled. Past Surgical History:  has a past surgical history that includes  section; Hysterectomy; Ankle fracture surgery (); lymph node biopsy (); Colonoscopy; Endoscopy, colon, diagnostic; and Cholecystectomy, laparoscopic (2014). Discharge Recommendations: Macrina Renee scored a 24/24 on the AM-PAC short mobility form. At this time, no further PT is recommended upon discharge due to patient at independent level. Recommend patient returns to prior setting with prior services. DME Required For Discharge: No new DME required  Precautions/Restrictions: low fall risk--Per mobility assessment    Pre-Admission Information   Lives With: alone                     Type of Home: apartment, .   Comment: senior/independent living complex  Home Layout: one level  Home Access: level entry  Bathroom Layout: walk in shower  Toilet Height: elevated height  Bathroom Equipment: grab bars in shower, grab bars around toilet, shower chair  Home Equipment: rollator - 4 wheeled walker  Transfer Assistance: modified independent with use of 4WW--mostly uses when leaving her apartment. Ambulation Assistance:modified independent with use of 4WW  ADL Assistance: independent with all ADL's  IADL Assistance: requires assistance with all homemaking tasks--pt states that she is currently working with  on aging for increased assistance with IADLs, but \"[employer]\" is having trouble with staffing. Active :        [x]? Yes                 []? No  Hand Dominance: [x]? Left                 []? Right  Current Employment: unemployed  Hobbies: reading,go shopping  Recent Falls: x1 fall last year while negotiating a step     Examination   Vision:   Vision Gross Assessment: Impaired and Vision Corrective Device: wears glasses for distance  Hearing:   WFL  Observation:   Edema: Edema located in (B) LE. Edema Level: 1+: trace with minimal depression  Sensation:   WFL  ROM:   (B) LE ROM WFL  Strength:   (B) LE gross strength WFL  Decision Making: low complexity  Clinical Presentation: stable      Subjective  General: Pt seated EOB upon PT/OT arrival, denies pain while at rest and is agreeable to engagement in therapy evaluations. Pain: 0/10  Pain Interventions: patient denies pain interventions       Functional Mobility  Bed Mobility  Bed mobility not completed on this date. Comments: pt seated EOB upon arrival  Transfers  Sit to stand transfer: modified independent, . Comment up to RW  Stand to sit transfer: modified independent  Comments:  Ambulation  Surface:level surface  Assistive Device: rolling walker  Assistance: modified independent  Distance: 100'   Gait Mechanics: bilateral decreased step length/step height, increased reliance onto RW once fatigued  Comments:    Stair Mobility  Stair mobility not completed on this date.   Comments: pt declines to complete stairs at this time, states that they are difficult to perform and she has not attempted since prior fall years ago  Balance  Static Sitting Balance: good(+): independent with high level dynamic balance in unsupported position  Dynamic Sitting Balance: good(+): independent with high level dynamic balance in unsupported position  Static Standing Balance: good: independent with functional balance in unsupported position  Dynamic Standing Balance: good: independent with functional balance in unsupported position--mod I with use of RW for ambulation, but likely would require SBA-CGA without use of device   Comments:    Other Therapeutic Interventions    Pt educated on home walking program, appropriate implementation of HEP with self monitoring of vitals during activity. Pt verbalizes understanding. Functional Outcomes  AM-PAC Inpatient Mobility Raw Score : 24              Cognition  WFL  Orientation:    A&O x 4    Education  Barriers To Learning: none  Patient Education: patient educated on goals, PT role and benefits, plan of care, general safety, functional mobility training, discharge recommendations  Learning Assessment:  Pt verbalized and demonstrates understanding    Assessment  Impairments Requiring Therapeutic Intervention: none - eval with same day discharge  Prognosis: good without need for therapy intervention  Clinical Assessment: Patient presenting at independent level for completion of required mobility tasks for return to home. Eval with d/c at this time. No therapy services indicated. Safety Interventions: call light within reach and nurse notified--pt left sitting EOB--RN aware    Plan  Frequency: Eval with same day discharge. No follow up required. Current Treatment Recommendations: Not applicable, evaluation completed with same day discharge. Goals  Patient eval with same day discharge. No goals set as patient is at baseline mobility status.       Therapy Session Time

## 2022-06-06 NOTE — PROGRESS NOTES
Pt awake in bed. VSS, assessment complete and medications given. PT in room to work with pt. Pt denies any needs and call light in reach.

## 2022-06-06 NOTE — DISCHARGE SUMMARY
[address] [facility] DISCHARGE SUMMARY    Patient Demographics    Patient. Jasper Jorgensen  Date of Birth. 1955  MRN. [id number]     Primary care provider. Sarthak Fragoso MD  (Tel: [phone])    Admit date: 6/2/2022    Discharge date (blank if same as Note Date): Note Date: 6/6/2022     Reason for Hospitalization. Chief Complaint   Patient presents with    Chest Pain     CP started about 2100 with jaw pain and sweating. pt. arrived by [email or web address]. pt. pain free. Significant Findings. Principal Problem:    Chest pain  Active Problems:    ELLIE (obstructive sleep apnea)    NSVT (nonsustained ventricular tachycardia) (HCC)    PAF (paroxysmal atrial fibrillation) (HCC)    Bradycardia, sinus  Resolved Problems:    * No resolved hospital problems. *       Problems and results from this hospitalization that need follow up. 1. Cardiology follow up Holter results. 2. Aortic stenosis. Continued surveillance. Significant test results and incidental findings. CXR 6/2/2022:  No acute cardiopulmonary process     Echo 6/3/2022:  Summary   -Technically difficult and limited exam due to body habitus.   -Normal overall global LV function with ejection fraction estimated at 55%.  -Definity contrast administered.   -Abnormal motion of the distal septum and apex.   -Wall motion assessment was limited due to poor endocardial border definition.   -Moderate aortic stenosis with a peak velocity of 3.43m/s and a mean pressure gradient of 31 mmHg. There is mild aortic insufficiency.   -Thickened mitral valve without evidence of significant stenosis or  regurgitation.   -Grade I diastolic dysfunction with normal LV filling pressures. Avg, E/e'=12.3    Invasive procedures and treatments. [facility] 6/3/3022:   Findings:  Artery Findings/Result   LM Normal   LAD Proximal ectasia, 40% distal   Cx Proximal to mid ectasia   RI Patient in office   N/A   RCA Proximal to mid ectasia, 30% distal   LVEDP NA   LVG NA      Intervention:         None     Post Cath Dx:       Nonobstructive CAD        Problem-based Hospital Course. Patient is a 75 yo female with hx fibromyalgia, HLD, HTN, hypothyroidism, DM2. She presented to ER with c/o chest and jaw pain associated with shortness of breath, diaphoresis and palpitations. Symptoms come on with exertion and ease with rest. She was pain free by the time she arrived in ER. EKG with nonspecific T wave abnormality. Troponin < 0.01 on presentation, subsequent 0.03. [facility] 6/3 with nonobstructive CAD. 1. Chest pain / elevated troponin. Troponin <0.01->0.03->0. 02. No acute changes on EKG. [facility] (6/3) with non-obstructive CAD. Appeared to be associated with NSVT. No further chest pain since starting sotalol. Continue asa, statin and beta blocker. 2. Symptomatic NSVT. TSH 1.11, potassium and magnesium WNL. Started on sotalol, QTc stable. Holter monitor placed on DC. If sustained arrhythmia will consider EP study. 3. Aortic stenosis. Echo demonstrates moderate AS with mean gradient 31 mmHg. 4. HTN. Elevated on presentation, improved. Continued amlodipine. 5. PAF. Remains in sinus rhythm. Continue apixaban. Has been started on sotalol for NSVT. 6. DM2. A1c 6.1. Takes metformin at home, held on admission. Covered with low dose correction, did not require. Resumed metformin on DC. 7. Fibromyalgia / chronic low back pain. Continue pregabalin, lidocaine pain patch, prn robaxin and prn Norco.      8. Hypothyroidism. TSH 1.11. Continued levothyroxine. Patient has been ambulatory in tejeda, no chest pain, shortness of breath, lightheadedness. Reviewed DC plans, follow up and medications with patient. Expresses understanding. Questions answered. Consults. IP CONSULT TO SPIRITUAL SERVICES  IP CONSULT TO CARDIOLOGY  IP CONSULT TO PULMONOLOGY    Physical examination on discharge day.    /69   Pulse 67   Temp 98.1 °F (36.7 °C) (Oral)   Resp 16   Ht 5' 8\" (1.727 m)   Wt (!) 311 lb (141.1 kg)   SpO2 94%   BMI 47.29 kg/m²   General appearance. Alert. Looks comfortable. HEENT. Sclera clear. Moist mucus membranes. Cardiovascular. Regular rate and rhythm, normal S1, S2. No murmur. Respiratory. Not using accessory muscles. Clear to auscultation bilaterally, no wheeze. Gastrointestinal. Abdomen soft, non-tender, not distended, normal bowel sounds  Neurology. Facial symmetry. No speech deficits. Moving all extremities equally. Extremities. No edema in lower extremities. Skin. Warm, dry, normal turgor    Condition at time of discharge stable    Medication instructions provided to patient at discharge. Medication List      START taking these medications    lidocaine 4 % external patch  Place 1 patch onto the skin daily     sotalol 80 MG tablet  Commonly known as: BETAPACE  Take 1 tablet by mouth 2 times daily        CHANGE how you take these medications    amLODIPine 5 MG tablet  Commonly known as: NORVASC  Take 1 tablet by mouth daily  What changed: medication strength     levothyroxine 25 MCG tablet  Commonly known as: SYNTHROID  TAKE ONE TABLET BY MOUTH DAILY ALONG WITH 200 MCG TABLET (225 MCG TOTAL)  What changed: See the new instructions.      Lexapro 20 MG tablet  Generic drug: escitalopram  What changed:   · medication strength  · how much to take        CONTINUE taking these medications    acetaminophen 325 mg tablet  Commonly known as: TYLENOL     Calcium-Magnesium-Vitamin D 185- MG-MG-UNIT Caps     COQ-10 PO     Eliquis 5 MG Tabs tablet  Generic drug: apixaban  Take 1 tablet by mouth 2 times daily     famotidine 20 MG tablet  Commonly known as: PEPCID     fish oil 1000 MG Caps     fluticasone 50 MCG/ACT nasal spray  Commonly known as: Flonase  2 sprays by Nasal route daily     HYDROcodone-acetaminophen 5-325 MG per tablet  Commonly known as: NORCO     LORazepam 0.5 MG tablet  Commonly known as: ATIVAN     methocarbamol 500 MG tablet  Commonly known as: ROBAXIN  TAKE 1 TABLET BY MOUTH 4 TIMES DAILY AS NEEDED (PAIN)     nortriptyline 75 MG capsule  Commonly known as: PAMELOR     omeprazole 40 MG delayed release capsule  Commonly known as: PRILOSEC  TAKE ONE CAPSULE BY MOUTH ONE TIME DAILY     pregabalin 50 MG capsule  Commonly known as: LYRICA     therapeutic multivitamin-minerals tablet     vitamin D 125 MCG (5000 UT) Caps capsule  Commonly known as: CHOLECALCIFEROL     Walker Misc  Use with walking or standing at all times to prevent falls. STOP taking these medications    buPROPion 300 MG extended release tablet  Commonly known as: WELLBUTRIN XL     busPIRone 10 MG tablet  Commonly known as: BUSPAR     metFORMIN 500 MG tablet  Commonly known as: GLUCOPHAGE     promethazine 25 MG tablet  Commonly known as: PHENERGAN     propranolol 60 MG tablet  Commonly known as: INDERAL           Where to Get Your Medications      These medications were sent to [facility], [place] [address] [address] - P [phone] - F [phone]  [address] [facility]    Phone: [phone]   · amLODIPine 5 MG tablet  · lidocaine 4 % external patch  · sotalol 80 MG tablet     You can get these medications from any pharmacy    Bring a paper prescription for each of these medications  · levothyroxine 25 MCG tablet         Discharge recommendations given to patient. Follow Up. PCP in 1 week   Disposition. home  Activity. activity as tolerated  Diet: ADULT DIET; Regular      Spent > 30 minutes in discharge process.     Signed:  AQUILES Berg CNP     6/6/2022 2:08 PM

## 2022-06-06 NOTE — PROGRESS NOTES
[facility]   Phone: [phone]    Occupational Therapy    [x] Initial Evaluation            [] Daily Treatment Note         [x] Discharge Summary      Patient: Prem Figueroa   : 1955   MRN: [id number]   Date of Service:  2022    Admitting Diagnosis:  Chest pain  Current Admission Summary: Patient is a 75 yo female with hx fibromyalgia, HLD, HTN, hypothyroidism, DM2. She presented to ER with c/o chest and jaw pain associated with shortness of breath, diaphoresis and palpitations. Symptoms come on with exertion and ease with rest. She was pain free by the time she arrived in ER. EKG with nonspecific T wave abnormality.  Troponin < 0.01 on presentation, subsequent 0.03. [facility] 6/3 with nonobstructive CAD.     Past Medical History:  has a past medical history of Aortic stenosis, Arthritis, Coronary artery disease, Depression, Fatigue, Fibromyalgia, Hyperlipidemia, Hypertension, Hypothyroidism, Pancreatitis, and Type II or unspecified type diabetes mellitus without mention of complication, not stated as uncontrolled. Past Surgical History:  has a past surgical history that includes  section; Hysterectomy; Ankle fracture surgery (); lymph node biopsy (); Colonoscopy; Endoscopy, colon, diagnostic; and Cholecystectomy, laparoscopic (2014). Discharge Recommendations: Prem Figueroa scored a 24/24 on the AM-PAC ADL Inpatient form. At this time, no further OT is recommended upon discharge due to patient at independent level. Recommend patient returns to prior setting with prior services. DME Required For Discharge: No DME required    Precautions/Restrictions: low fall risk, .   Comment: per mobility assessments    Pre-Admission Information   Lives With: alone                     Type of Home: apartment, .  Comment: senior/independent living complex  Home Layout: one level  Home Access: level entry  [address] in shower  Toilet Height: elevated height  Bathroom Equipment: grab bars in shower, grab bars around toilet, shower chair  Home Equipment: rollator - 4 wheeled walker  Transfer Assistance: modified independent with use of 4WW--mostly uses when leaving her apartment.   Ambulation Assistance:modified independent with use of 4WW  ADL Assistance: independent with all ADL's  IADL Assistance: requires assistance with all homemaking tasks--pt states that she is currently working with  on aging for increased assistance with IADLs, but \"[employer]\" is having trouble with staffing.   Active :        [x]? ? Yes                 []? ? No  Hand Dominance: [x]? ? Left                 []? ? Right  Current Employment: unemployed  Hobbies: reading,go shopping  Recent Falls: x1 fall last year while negotiating a step      Examination   Vision:   Vision Gross Assessment: Impaired and Vision Corrective Device: wears glasses for distance  Hearing:   WFL  Observation:   Edema: Edema located in (B) LE. Edema Level: 1+: trace with minimal depression  Sensation:   WFL  ROM:   (B) UE ROM WFL  Strength:   (B) UE gross strength WFL    Decision Making: low complexity  Clinical Presentation: stable      Subjective  General: Pt sitting EOB upon arrival. She is pleasant and agreeable to PT/OT evaluations. Pain: 0/10  Pain Interventions: not applicable        Activities of Daily Living  Basic Activities of Daily Living  Toileting: modified independent. Equipment: none  Instrumental Activities of Daily Living  No IADL completed on this date. Functional Mobility  Bed Mobility  Bed mobility not completed on this date. Comments:  Transfers  Sit to stand transfer:modified independent  Stand to sit transfer: modified independent  Toilet transfer: modified independent. Mobility technique: ambulating. Equipment utilized: standard bedside commode  Comments:  Functional Mobility:  Sitting Balance: Independent. Standing Balance Comment:  Mod I with RW--typically uses 4WW at home  Functional Mobility: rolling walker. modified independent, . Comment Pt ambulated 100ft in unit hallway with RW. She reports increased diaphoresis and minior fatigue and SOB but able to pace herself to recovery within 1 min rest. . Activity: ambulating in unit hallway    Other Therapeutic Interventions    Functional Outcomes  AM-PAC Inpatient Daily Activity Raw Score: 24    Cognition  WFL  Orientation:    A&O x 4  Command Following:   [facility]     Education  Barriers To Learning: none  Patient Education: Patient educated on OT role and benefits, discharge recommendations--pacing self while on beta blockers. Learning Assessment:  Patient verbalized and demonstrates understanding    Assessment  Impairments Requiring Therapeutic Intervention: none - eval with same day discharge  Prognosis: good without need for therapy intervention  Clinical Assessment: Patient presenting at independent level for completion of required self care tasks for return to home. Eval with d/c at this time. No therapy services indicated. Safety Interventions: patient left in bed and nurse notified    Plan  Frequency: Eval with same day discharge. No follow up required. Current Treatment Recommendations: Not applicable, evaluation completed with same day discharge. Goals  Patient eval with same day discharge. No goals set as patient is at baseline self care status.       Therapy Session Time     Individual Group Co-treatment   Time In    1529   Time Out    0911   Minutes    30        Timed Code Treatment Minutes:   15 minutes    Total Treatment Minutes:  30 minutes    Electronically Signed By: CLEVE Skinner OTR/L  [id number]

## 2022-06-07 LAB
EKG ATRIAL RATE: 57 BPM
EKG DIAGNOSIS: NORMAL
EKG P AXIS: 49 DEGREES
EKG P-R INTERVAL: 164 MS
EKG Q-T INTERVAL: 452 MS
EKG QRS DURATION: 116 MS
EKG QTC CALCULATION (BAZETT): 439 MS
EKG R AXIS: -32 DEGREES
EKG T AXIS: -30 DEGREES
EKG VENTRICULAR RATE: 57 BPM

## 2022-06-07 PROCEDURE — 93010 ELECTROCARDIOGRAM REPORT: CPT | Performed by: INTERNAL MEDICINE

## 2022-06-07 NOTE — PROGRESS NOTES
Physician Progress Note      PATIENT:               Genevieve Boykin  CSN #:                  [id number]  :                       1955  ADMIT DATE:       2022 10:15 PM  [address] [place] DATE:        2022 3:24 PM  RESPONDING  PROVIDER #:        Annabelle Yun CNP          QUERY TEXT:    Patient admitted with Chest pain. Documentation reflects NSTEMI in note dated   6/3/2022. If possible, please document in the progress notes and discharge   summary if NSTEMI was: The medical record reflects the following:  Risk Factors: Hx. CAD and HTN with Aortic Stenosis and arrhythmias  Clinical Indicators: Troponin 0.03 on 6/3; Per Progress Note on 6/3/2022:   Subsequent troponin positive for NSTEMI; per Cardiology consult on 6/3/2022:   NSTEMI  Treatment: Cardiology consult, LHC, EKG, trended Troponin  Options provided:  -- NSTEMI confirmed after study  -- NSTEMI ruled out after study  -- Other - I will add my own diagnosis  -- Disagree - Not applicable / Not valid  -- Disagree - Clinically unable to determine / Unknown  -- Refer to Clinical Documentation Reviewer    PROVIDER RESPONSE TEXT:    NSTEMI ruled out after study.     Query created by: Charisse Felton on 2022 4:43 PM      Electronically signed by:  Shaka Yun CNP 2022 2:06 PM

## 2022-06-08 ENCOUNTER — TELEPHONE (OUTPATIENT)
Dept: PULMONOLOGY | Age: 67
End: 2022-06-08

## 2022-06-08 DIAGNOSIS — R06.83 SNORING: ICD-10-CM

## 2022-06-08 DIAGNOSIS — R53.83 FATIGUE, UNSPECIFIED TYPE: ICD-10-CM

## 2022-06-08 DIAGNOSIS — G47.33 OSA (OBSTRUCTIVE SLEEP APNEA): Primary | ICD-10-CM

## 2022-06-08 DIAGNOSIS — I48.0 PAF (PAROXYSMAL ATRIAL FIBRILLATION) (HCC): ICD-10-CM

## 2022-06-08 NOTE — TELEPHONE ENCOUNTER
----- Message from Lucy Echevarria MD sent at 6/3/2022 11:45 AM EDT -----   Sleep study and then office follow-up

## 2022-06-22 PROCEDURE — 93228 REMOTE 30 DAY ECG REV/REPORT: CPT | Performed by: INTERNAL MEDICINE

## 2022-06-23 RX ORDER — APIXABAN 5 MG/1
TABLET, FILM COATED ORAL
Qty: 180 TABLET | Refills: 2 | Status: SHIPPED | OUTPATIENT
Start: 2022-06-23

## 2022-06-23 NOTE — TELEPHONE ENCOUNTER
Requested Prescriptions     Pending Prescriptions Disp Refills    ELIQUIS 5 MG TABS tablet [Pharmacy Med Name: ELIQUIS 5 MG TABLET 5 Tablet] 180 tablet 3     Sig: Take 1 tablet by mouth 2 times daily                  Last Office Visit: 6/24/2021     Next Office Visit: 06/27/2022      Last Labs:06/06/2022

## 2022-07-11 ENCOUNTER — TELEPHONE (OUTPATIENT)
Dept: CARDIOLOGY CLINIC | Age: 67
End: 2022-07-11

## 2022-07-31 ENCOUNTER — HOSPITAL ENCOUNTER (EMERGENCY)
Age: 67
Discharge: HOME | End: 2022-07-31
Attending: EMERGENCY MEDICINE
Payer: MEDICARE

## 2022-07-31 DIAGNOSIS — R23.3 SKIN HEMORRHAGE: Primary | ICD-10-CM

## 2022-07-31 PROCEDURE — 99282 EMERGENCY DEPT VISIT SF MDM: CPT

## 2022-07-31 NOTE — ED PROVIDER NOTES
Surgical History:   Procedure Laterality Date    ANKLE FRACTURE SURGERY      right     SECTION      CHOLECYSTECTOMY, LAPAROSCOPIC  2014    LAPAROSCOPIC CHOLECYSTECTOMY WITH INTRAOPERATIVE    COLONOSCOPY      ENDOSCOPY, COLON, DIAGNOSTIC      HYSTERECTOMY      LYMPH NODE BIOPSY           CURRENT MEDICATIONS       Previous Medications    ACETAMINOPHEN (TYLENOL) 325 MG TABLET    Take 650 mg by mouth every 6 hours as needed for Pain    AMLODIPINE (NORVASC) 5 MG TABLET    Take 1 tablet by mouth daily    CALCIUM-MAGNESIUM-VITAMIN D 185- MG-MG-UNIT CAPS    Take 1 capsule by mouth daily    COENZYME Q10 (COQ-10 PO)    Take by mouth daily    ELIQUIS 5 MG TABS TABLET    Take 1 tablet by mouth 2 times daily    ESCITALOPRAM (LEXAPRO) 20 MG TABLET    Take 1 tablet by mouth nightly Unsure of mg amount    FAMOTIDINE (PEPCID) 20 MG TABLET    Take 1 tablet by mouth 2 times daily as needed    FLUTICASONE (FLONASE) 50 MCG/ACT NASAL SPRAY    2 sprays by Nasal route daily    HYDROCODONE-ACETAMINOPHEN (NORCO) 5-325 MG PER TABLET    Take 1 tablet by mouth every 6 hours as needed for Pain. LEVOTHYROXINE (SYNTHROID) 25 MCG TABLET    TAKE ONE TABLET BY MOUTH DAILY ALONG WITH 200 MCG TABLET (225 MCG TOTAL)    LIDOCAINE 4 % EXTERNAL PATCH    Place 1 patch onto the skin daily    LORAZEPAM (ATIVAN) 0.5 MG TABLET    Take 0.5 mg by mouth every 6 hours as needed for Anxiety. METHOCARBAMOL (ROBAXIN) 500 MG TABLET    TAKE 1 TABLET BY MOUTH 4 TIMES DAILY AS NEEDED (PAIN)    MISC. DEVICES (WALKER) MISC    Use with walking or standing at all times to prevent falls. NORTRIPTYLINE (PAMELOR) 75 MG CAPSULE    100 mg nightly 2 50 mg caps at bed time per pt    OMEGA-3 FATTY ACIDS (FISH OIL) 1000 MG CAPS    Take 1,000 mg by mouth daily. OMEPRAZOLE (PRILOSEC) 40 MG DELAYED RELEASE CAPSULE    TAKE ONE CAPSULE BY MOUTH ONE TIME DAILY    PREGABALIN (LYRICA) 50 MG CAPSULE    Take 1 capsule by mouth 3 times daily.  Once during the day and two at night per pt    SOTALOL (BETAPACE) 80 MG TABLET    Take 1 tablet by mouth 2 times daily    THERAPEUTIC MULTIVITAMIN-MINERALS (THERAGRAN-M) TABLET    Take 1 tablet by mouth daily. VITAMIN D (CHOLECALCIFEROL) 5000 UNITS CAPS CAPSULE    Take 5,000 Units by mouth daily       ALLERGIES     Cephalosporins, Dilaudid [hydromorphone hcl], Fortaz [ceftazidime], and Statins    FAMILY HISTORY       Family History   Problem Relation Age of Onset    Diabetes Mother     Hypertension Mother     Stroke Father     Heart Disease Brother         aortic stenosis          SOCIAL HISTORY       Social History     Socioeconomic History    Marital status:    Tobacco Use    Smoking status: Former     Types: Cigarettes     Quit date: 2009     Years since quittin.6    Smokeless tobacco: Never    Tobacco comments:     quit    Vaping Use    Vaping Use: Never used   Substance and Sexual Activity    Alcohol use: No    Drug use: No       SCREENINGS             PHYSICAL EXAM    (up to 7 for level 4, 8 or more for level 5)     ED Triage Vitals   BP Temp Temp src Pulse Resp SpO2 Height Weight   -- -- -- -- -- -- -- --           General Appearance:  Alert, cooperative, no distress, appears stated age. Head:  Normocephalic, without obvious abnormality, atraumatic. Eyes:  conjunctiva/corneas clear, EOM's intact. Sclera anicteric. ENT: Mucous membranes moist.   Neck: Supple, symmetrical, trachea midline, no adenopathy. No jugular venous distention. Lungs:   No Respiratory Distress. no rales  rhonchi rub   Chest Wall:  Nontender  no deformity   Heart:  Rsr no murmer gallop    Abdomen:   Soft nontender no organomegally    Extremities:  Full range of motion. no deformity   Pulses: Equal  upper and lower    Skin:  No rashes or lesions to exposed skin. Neurologic: Alert and oriented X 3.    Motor grossly normal.     Subcutaneous hemorrhage noted to the left lateral thigh just above the knee with some varicosities minimally tender to palpation no active bleeding    DIAGNOSTIC RESULTS   LABS:    Labs Reviewed - No data to display    All other labs were within normal range or not returned as of thisdictation. EKG: All EKG's are interpreted by the Emergency Department Physician who either signs or Co-signs this chart in the absence of a cardiologist.        RADIOLOGY:   Non-plain film images such as CT, Ultrasound and MRI are read by the radiologist. Belvie Vazquez images are visualized and preliminarily interpreted by the  ED Provider with the belowfindings:        Interpretation per the Radiologist below, if available at the time of this note:    No orders to display         PROCEDURES   Unless otherwise noted below, none     Procedures    CRITICAL CARE TIME   N/A      CONSULTS:  None    EMERGENCY DEPARTMENT COURSE and DIFFERENTIAL DIAGNOSIS/MDM:   Vitals: There were no vitals filed for this visit. Patient was given the following medications:  Medications - No data to display    Patient was advised that this is not dangerous she can apply some cool compresses    Is this patient to be included in the SEP-1 Core Measure due to severe sepsis or septic shock? No   Exclusion criteria - the patient is NOT to be included for SEP-1 Core Measure due to: Infection is not suspected      The patient tolerated their visit well. Thepatient and / or the family were informed of the results of any tests, a time was given to answer questions. FINAL IMPRESSION      1.  Skin hemorrhage        DISPOSITION/PLAN   DISPOSITION Decision To Discharge 07/31/2022 12:51:32 AM      PATIENT REFERRED TO:  Janel Waters MD  [address] [facility]  [phone]          DISCHARGE MEDICATIONS:  New Prescriptions    No medications on file       DISCONTINUED MEDICATIONS:  Discontinued Medications    No medications on file              (Please note that portions of this note were completed with a voice recognition program.  Efforts were made to edit the dictations but occasionally words aremis-transcribed.)    Severiano Laughter, MD (electronically signed)           Severiano Laughter, MD  07/31/22 [id number]

## 2022-08-03 NOTE — PROGRESS NOTES
[facility]   Electrophysiology  Office Visit  Date: 8/11/2022    Chief Complaint   Patient presents with    Follow-Up from Hospital    Atrial Fibrillation    Tachycardia    Chest Pain    Obesity         Cardiac HX: Dara Mejias is a 74 y.o. woman with a h/o HTN, HLD, DM, morbid obesity, fibromyalgia, ELLIE, SB, AS, pAF on Eliquis, s/p DCCV to NSR (2/5/2021), recent admission for CP (6/2/2022), trop max 0.03, s/p LHC (6/2/2022, Dr. Leatha Jacob) showed nonobstructive CAD, noted to have s/s NSVT on tele, loaded w/ sotalol 80 mg BID. Interval History/HPI: Patient is here to f/u for NSVT and pAF. Patient been seen in the hospital in June with complaints of chest pain. She has undergone cardiac catheterization on 6/2/2022 which showed ectatic coronary artery disease with no obvious obstructive lesions. She does follow with Dr. Brianna Diaz. She did have nonsustained VT noted on telemetry while she was hospitalized. She states that she was feeling a pain in her jaw and her teeth actually hurt and then it radiated into her chest and then down her arm. He states she continued to have symptoms in the hospital and that is when they noticed the nonsustained VT on telemetry. She was loaded with sotalol 80 mg twice a day at that time. She states that she has felt symptoms at home however they have been brief. She did wear a 2-week vital connect monitor (6/6/2022 - 6/21/2022) that showed an average HR 68 (), a 0.02% PVC burden, 11 NSVT episodes with the longest lasting 7 beats in length. She does have a history of paroxysmal atrial fibrillation, she currently is on Eliquis 5 mg twice a day. No issues with bleeding or dark tarry stools. There is no evidence of atrial fibrillation noted on her device. Main complaint today is her left knee pain. She states that she gone to the emergency room on 7/31/2022 as she thought that she had bleeding underneath the skin and she is on Eliquis and was concerned. She states that no testing was done at that time including vital signs and she was sent home. She has not noticed any further bleeding however she states that the pain in her leg is been worse since that time. She currently denies any chest pain, shortness of breath, PND, orthopnea or lower extremity edema. Home medications:   Current Outpatient Medications on File Prior to Visit   Medication Sig Dispense Refill    ELIQUIS 5 MG TABS tablet Take 1 tablet by mouth 2 times daily 180 tablet 2    amLODIPine (NORVASC) 5 MG tablet Take 1 tablet by mouth daily 30 tablet 2    sotalol (BETAPACE) 80 MG tablet Take 1 tablet by mouth 2 times daily 60 tablet 1    pregabalin (LYRICA) 50 MG capsule Take 1 capsule by mouth 3 times daily. Once during the day and two at night per pt      lidocaine 4 % external patch Place 1 patch onto the skin daily 1 box 1    famotidine (PEPCID) 20 MG tablet Take 1 tablet by mouth 2 times daily as needed      levothyroxine (SYNTHROID) 25 MCG tablet TAKE ONE TABLET BY MOUTH DAILY ALONG WITH 200 MCG TABLET (225 MCG TOTAL) 90 tablet 0    escitalopram (LEXAPRO) 20 MG tablet Take 1 tablet by mouth nightly Unsure of mg amount 30 tablet 3    nortriptyline (PAMELOR) 75 MG capsule 100 mg nightly 2 50 mg caps at bed time per pt      LORazepam (ATIVAN) 0.5 MG tablet Take 0.5 mg by mouth every 6 hours as needed for Anxiety. HYDROcodone-acetaminophen (NORCO) 5-325 MG per tablet Take 1 tablet by mouth every 6 hours as needed for Pain. Misc. Devices Anny Robles) MISC Use with walking or standing at all times to prevent falls.  1 each 0    methocarbamol (ROBAXIN) 500 MG tablet TAKE 1 TABLET BY MOUTH 4 TIMES DAILY AS NEEDED (PAIN) 60 tablet 2    omeprazole (PRILOSEC) 40 MG delayed release capsule TAKE ONE CAPSULE BY MOUTH ONE TIME DAILY 90 capsule 1    fluticasone (FLONASE) 50 MCG/ACT nasal spray 2 sprays by Nasal route daily 1 Bottle 0    vitamin D (CHOLECALCIFEROL) 5000 units CAPS capsule Take 5,000 Units by mouth daily      Coenzyme Q10 (COQ-10 PO) Take by mouth daily      Calcium-Magnesium-Vitamin D 185- MG-MG-UNIT CAPS Take 1 capsule by mouth daily      acetaminophen (TYLENOL) 325 MG tablet Take 650 mg by mouth every 6 hours as needed for Pain      therapeutic multivitamin-minerals (THERAGRAN-M) tablet Take 1 tablet by mouth daily. Omega-3 Fatty Acids (FISH OIL) 1000 MG CAPS Take 1,000 mg by mouth daily. No current facility-administered medications on file prior to visit. Past Medical History:   Diagnosis Date    Aortic stenosis     Arthritis     Coronary artery disease     Depression     Fatigue     Fibromyalgia     Hyperlipidemia     Hypertension     Hypothyroidism     Pancreatitis     Type II or unspecified type diabetes mellitus without mention of complication, not stated as uncontrolled         Past Surgical History:   Procedure Laterality Date    ANKLE FRACTURE SURGERY      right     SECTION      CHOLECYSTECTOMY, LAPAROSCOPIC  2014    LAPAROSCOPIC CHOLECYSTECTOMY WITH INTRAOPERATIVE    COLONOSCOPY      ENDOSCOPY, COLON, DIAGNOSTIC      HYSTERECTOMY (CERVIX STATUS UNKNOWN)      LYMPH NODE BIOPSY         Allergies   Allergen Reactions    Cephalosporins      Cough and congestion    Dilaudid [Hydromorphone Hcl] Itching     Nervous, unable to sleep    Fortaz [Ceftazidime]      Cough and congestion    Statins Other (See Comments)     Muscle cramping/weakness       Social History:  Reviewed. reports that she quit smoking about 12 years ago. Her smoking use included cigarettes. She has never used smokeless tobacco. She reports that she does not drink alcohol and does not use drugs. Family History:  Reviewed. family history includes Diabetes in her mother; Heart Disease in her brother; Hypertension in her mother; Stroke in her father. Review of System:    Constitutional: No fevers, chills. Eyes: No visual changes or diplopia. No scleral icterus.   ENT: No Headaches. No mouth sores or sore throat. Cardiovascular: No for chest pain, No for dyspnea on exertion, Yes for palpitations or No for loss of consciousness. No cough, hemoptysis, No for pleuritic pain, or phlebitis. Respiratory: No for cough or wheezing. No hematemesis. Gastrointestinal: No abdominal pain, blood in stools. Genitourinary: No dysuria, or hematuria. Musculoskeletal: No gait disturbance,    Integumentary: No rash or pruritis. Neurological: No headache, change in muscle strength, numbness or tingling. Psychiatric: No anxiety, or depression. Endocrine: No temperature intolerance. No excessive thirst, fluid intake, or urination. Hem/Lymph: No abnormal bruising or bleeding, blood clots or swollen lymph nodes. Allergic/Immunologic: No nasal congestion or hives. Physical Examination:  Vitals:    08/11/22 1555   BP: 122/80   Pulse: 75         Wt Readings from Last 3 Encounters:   08/11/22 (!) 322 lb 6.4 oz (146.2 kg)   06/06/22 (!) 311 lb (141.1 kg)   05/27/22 (!) 324 lb 3.2 oz (147.1 kg)       Constitutional: Oriented. No distress. Head: Normocephalic and atraumatic. Mouth/Throat: Oropharynx is clear and moist.   Eyes: Conjunctivae clear without jaunduice. PERRL. Neck: Neck supple. No rigidity. No JVD present. Cardiovascular: Normal rate, regular rhythm, S1&S2. Pulmonary/Chest: Bilateral respiratory sounds. No wheezes, No rhonchi. Abdominal: Soft. Bowel sounds present. No distension, No tenderness. Musculoskeletal: No tenderness. No edema    Lymphadenopathy: Has no cervical adenopathy. Neurological: Alert and oriented. Cranial nerve appears intact, No Gross deficit   Skin: Skin is warm and dry. No rash noted. Psychiatric: Has a normal mood, affect and behavior     Labs:  Reviewed. No results for input(s): NA, K, CL, CO2, PHOS, BUN, CREATININE, CA in the last 72 hours.     Invalid input(s):  TSH  No results for input(s): WBC, HGB, HCT, MCV, PLT in the last 72 hours. Lab Results   Component Value Date/Time    CKTOTAL 139 08/29/2017 05:08 PM    TROPONINI 0.02 06/03/2022 05:34 AM     No results found for: BNP  Lab Results   Component Value Date/Time    PROTIME 15.2 02/02/2021 11:00 AM    PROTIME 10.6 12/31/2020 04:56 PM    PROTIME 10.3 05/22/2016 08:00 PM    INR 1.90 02/05/2021 09:39 AM    INR 1.31 02/02/2021 11:00 AM    INR 0.91 12/31/2020 04:56 PM     Lab Results   Component Value Date/Time    CHOL 181 06/04/2022 05:35 AM    HDL 44 06/04/2022 05:35 AM    HDL 50 08/19/2011 01:59 AM    TRIG 115 06/04/2022 05:35 AM       ECG: Personally reviewed: NSR, HR 71, , , QTc 399    ECHO:  6/3/2022  Summary   -Technically difficult and limited exam due to body habitus.   -Normal overall global LV function with ejection fraction estimated at 55%. -Definity contrast administered.   -Abnormal motion of the distal septum and apex.   -Wall motion assessment was limited due to poor endocardial border   definition.   -Moderate aortic stenosis with a peak velocity of 3.43m/s and a mean   pressure gradient of 31 mmHg. There is mild aortic insufficiency.   -Thickened mitral valve without evidence of significant stenosis or   regurgitation.   -Grade I diastolic dysfunction with normal LV filling pressures. Avg,   E/e'=12.3     Stress Test: 2015  SPECT stress rest myocardial perfusion imaging and wall motion   study. History: Abnormal EKG       Chemical stress. 10 mCi technetium, 26 mCi technetium.        No fixed reversible defects to suggest infarct or ischemia       Normal wall motion with normal ejection fraction 70 percent         Cardiac Angiography: 6/3/2022 Dr. Ila Reddy     Artery Findings/Result   LM Normal   LAD Proximal ectasia, 40% distal   Cx Proximal to mid ectasia   RI N/A   RCA Proximal to mid ectasia, 30% distal   LVEDP NA   LVG NA      Problem List:   Patient Active Problem List    Diagnosis Date Noted    PAF (paroxysmal atrial fibrillation) ([address] Chest pain 06/03/2022    ELLIE (obstructive sleep apnea)     NSVT (nonsustained ventricular tachycardia) ([facility]     Morbid obesity due to excess calories ([address] 11/24/2017    Obesity, morbid, BMI 40.0-49.9 ([facility] 11/05/2015    Injury of left wrist 10/28/2015    Syncope, near 10/21/2015    Biliary dyskinesia 06/02/2014    Pancreatitis 05/16/2014    Acute renal failure ([address] 10/03/2013    Bradycardia, sinus 10/03/2013    Myofascial pain syndrome 11/05/2012    DDD (degenerative disc disease), lumbosacral 11/05/2012    Recurrent major depressive disorder, in full remission ([address] 08/26/2010    Fatigue 08/26/2010    Hyperglycemia 08/26/2010    Hyperlipidemia 08/26/2010    Aortic stenosis 08/26/2010    Ankle edema 08/26/2010    Hypothyroidism 08/26/2010    HTN (hypertension) 08/26/2010    Fibromyalgia 08/26/2010      Assessment:   1. PAF (paroxysmal atrial fibrillation) ([facility]    2. On continuous oral anticoagulation    3. Encounter for monitoring sotalol therapy    4. NSVT (nonsustained ventricular tachycardia) ([facility]    5. Coronary artery disease involving native coronary artery of native heart without angina pectoris      Cardiac HX: Shakira Shay is a 74 y.o. woman with a h/o HTN, HLD, DM, morbid obesity, fibromyalgia, ELLIE, SB, AS, pAF on Eliquis, s/p DCCV to NSR (2/5/2021), recent admission for CP (6/2/2022), trop max 0.03, s/p LHC (6/2/2022, Dr. Madelyn Lieberman) showed nonobstructive CAD, noted to have s/s NSVT on tele, loaded w/ sotalol 80 mg BID. [id number] 5. TSH 1.11 (6/3/2022). pAF  - In NSR  - S/p DCCV to NSR 2/5/2021  - On Eliquis 5 mg BID - no s/s bleeding - continue  - On sotalol 80 mg BID - QTc 399  - Reviewed risk factors, pathophysiology, treatment options and lifestyle modification for atrial fibrillation: Blood pressure control, blood sugar control, healthy diet, minimal alcohol intake, no smoking, activity and exercise, manage stress sleep apnea evaluation and symptoms of a stroke.   - Reviewed recent labs  - 2 week Vital Connect (6/6/2022 - 6/21/2022) that showed an average HR 68 (), a 0.02% PVC burden, 11 NSVT episodes with the longest lasting 7 beats in length. - ECG ordered and results personally reviewed     NSVT/CAD  - [facility] mild/mod ectasia (6/3/2022, Dr. Fareed Villaseñor), no intervention  - On sotalol 80 mg BID w/ s/s improvement  - Minimal NSVT on monitor  - Repeat monitor if s/s increase  - Lifestyle modification - diet, weight loss - hand out given    EF of 94%  No systolic HF  No known CAD  Anticoagulation for AF   No Tobacco use. All questions and concerns were addressed to the patient/family. Alternatives to my treatment were discussed. The note was completed using EMR. Every effort was made to ensure accuracy; however, inadvertent computerized transcription errors may be present. Patient received education regarding their diagnosis, treatment and medications while in the office today. [facility]      I  have spent 40 minutes in care of the patient including direct face to face time, chart preparation, reviewing diagnostic testing, other provider notes and coordinating patient care.

## 2022-08-11 ENCOUNTER — OFFICE VISIT (OUTPATIENT)
Dept: CARDIOLOGY CLINIC | Age: 67
End: 2022-08-11
Payer: MEDICARE

## 2022-08-11 VITALS
BODY MASS INDEX: 49.02 KG/M2 | WEIGHT: 293 LBS | DIASTOLIC BLOOD PRESSURE: 80 MMHG | SYSTOLIC BLOOD PRESSURE: 122 MMHG | HEART RATE: 75 BPM

## 2022-08-11 DIAGNOSIS — Z79.01 ON CONTINUOUS ORAL ANTICOAGULATION: ICD-10-CM

## 2022-08-11 DIAGNOSIS — I47.29 NSVT (NONSUSTAINED VENTRICULAR TACHYCARDIA): ICD-10-CM

## 2022-08-11 DIAGNOSIS — Z51.81 ENCOUNTER FOR MONITORING SOTALOL THERAPY: ICD-10-CM

## 2022-08-11 DIAGNOSIS — Z79.899 ENCOUNTER FOR MONITORING SOTALOL THERAPY: ICD-10-CM

## 2022-08-11 DIAGNOSIS — I25.10 CORONARY ARTERY DISEASE INVOLVING NATIVE CORONARY ARTERY OF NATIVE HEART WITHOUT ANGINA PECTORIS: ICD-10-CM

## 2022-08-11 DIAGNOSIS — I48.0 PAF (PAROXYSMAL ATRIAL FIBRILLATION) (HCC): Primary | ICD-10-CM

## 2022-08-11 PROCEDURE — 93000 ELECTROCARDIOGRAM COMPLETE: CPT | Performed by: NURSE PRACTITIONER

## 2022-08-11 PROCEDURE — G8400 PT W/DXA NO RESULTS DOC: HCPCS | Performed by: NURSE PRACTITIONER

## 2022-08-11 PROCEDURE — 1090F PRES/ABSN URINE INCON ASSESS: CPT | Performed by: NURSE PRACTITIONER

## 2022-08-11 PROCEDURE — 1036F TOBACCO NON-USER: CPT | Performed by: NURSE PRACTITIONER

## 2022-08-11 PROCEDURE — 99215 OFFICE O/P EST HI 40 MIN: CPT | Performed by: NURSE PRACTITIONER

## 2022-08-11 PROCEDURE — G8427 DOCREV CUR MEDS BY ELIG CLIN: HCPCS | Performed by: NURSE PRACTITIONER

## 2022-08-11 PROCEDURE — 1123F ACP DISCUSS/DSCN MKR DOCD: CPT | Performed by: NURSE PRACTITIONER

## 2022-08-11 PROCEDURE — G8417 CALC BMI ABV UP PARAM F/U: HCPCS | Performed by: NURSE PRACTITIONER

## 2022-08-11 PROCEDURE — 3017F COLORECTAL CA SCREEN DOC REV: CPT | Performed by: NURSE PRACTITIONER

## 2022-08-15 ENCOUNTER — TELEPHONE (OUTPATIENT)
Dept: ORTHOPEDIC SURGERY | Age: 67
End: 2022-08-15

## 2022-08-22 ENCOUNTER — OFFICE VISIT (OUTPATIENT)
Dept: ORTHOPEDIC SURGERY | Age: 67
End: 2022-08-22
Payer: MEDICARE

## 2022-08-22 VITALS — WEIGHT: 293 LBS | HEIGHT: 66 IN | BODY MASS INDEX: 47.09 KG/M2

## 2022-08-22 DIAGNOSIS — M25.561 PAIN IN BOTH KNEES, UNSPECIFIED CHRONICITY: Primary | ICD-10-CM

## 2022-08-22 DIAGNOSIS — M17.0 BILATERAL PRIMARY OSTEOARTHRITIS OF KNEE: ICD-10-CM

## 2022-08-22 DIAGNOSIS — M25.562 PAIN IN BOTH KNEES, UNSPECIFIED CHRONICITY: Primary | ICD-10-CM

## 2022-08-22 PROCEDURE — 99204 OFFICE O/P NEW MOD 45 MIN: CPT | Performed by: ORTHOPAEDIC SURGERY

## 2022-08-22 PROCEDURE — 20610 DRAIN/INJ JOINT/BURSA W/O US: CPT | Performed by: ORTHOPAEDIC SURGERY

## 2022-08-22 PROCEDURE — 1123F ACP DISCUSS/DSCN MKR DOCD: CPT | Performed by: ORTHOPAEDIC SURGERY

## 2022-08-22 RX ORDER — LIDOCAINE HYDROCHLORIDE 10 MG/ML
4 INJECTION, SOLUTION INFILTRATION; PERINEURAL ONCE
Status: COMPLETED | OUTPATIENT
Start: 2022-08-22 | End: 2022-08-22

## 2022-08-22 RX ORDER — TRIAMCINOLONE ACETONIDE 40 MG/ML
40 INJECTION, SUSPENSION INTRA-ARTICULAR; INTRAMUSCULAR ONCE
Status: COMPLETED | OUTPATIENT
Start: 2022-08-22 | End: 2022-08-22

## 2022-08-22 RX ADMIN — LIDOCAINE HYDROCHLORIDE 4 ML: 10 INJECTION, SOLUTION INFILTRATION; PERINEURAL at 15:35

## 2022-08-22 RX ADMIN — TRIAMCINOLONE ACETONIDE 40 MG: 40 INJECTION, SUSPENSION INTRA-ARTICULAR; INTRAMUSCULAR at 15:34

## 2022-08-22 NOTE — PROGRESS NOTES
Patient: Vikas Kauffman  : 1955    MRN: [id number]    Date of Visit: 22    Attending Physician: Nessa Butts    History of Present Illness  Ms. Pieter Leyva is a very pleasant 74 y.o. patient with a several year history of progressive BILATERAL knee pain. There is no precipitating event or trauma. The pain is located predominantly in the medial and anterior aspects of the knees aggravated by weight bearing. Walking even short distances can be painful. Stair climbing is progressing becoming more difficult and painful. However, it can also awaken the patient at night. She has tried the following interventions without sustained functional improvement:    Low-impact, structured therapy/exercise program  Tylenol Arthritis strength cannot do nsaids due to eliquis for AFIB  Crtisone injections/viscosupplementation   Rollator for ambulation    Her current BMI is 51.     PMH/PSH:  Past Medical History:   Diagnosis Date    Aortic stenosis     Arthritis     Coronary artery disease     Depression     Fatigue     Fibromyalgia     Hyperlipidemia     Hypertension     Hypothyroidism     Pancreatitis     Type II or unspecified type diabetes mellitus without mention of complication, not stated as uncontrolled      Patient Active Problem List   Diagnosis    Recurrent major depressive disorder, in full remission ([facility]    Fatigue    Hyperglycemia    Hyperlipidemia    Aortic stenosis    Ankle edema    Hypothyroidism    HTN (hypertension)    Fibromyalgia    Myofascial pain syndrome    DDD (degenerative disc disease), lumbosacral    Acute renal failure (HCC)    Bradycardia, sinus    Pancreatitis    Biliary dyskinesia    Syncope, near    Injury of left wrist    Obesity, morbid, BMI 40.0-49.9 ([address]    Morbid obesity due to excess calories (HCC)    Chest pain    ELLIE (obstructive sleep apnea)    NSVT (nonsustained ventricular tachycardia) (HCC)    PAF (paroxysmal atrial fibrillation) ([address]     Past Surgical History: Procedure Laterality Date    ANKLE FRACTURE SURGERY      right     SECTION      CHOLECYSTECTOMY, LAPAROSCOPIC  2014    LAPAROSCOPIC CHOLECYSTECTOMY WITH INTRAOPERATIVE    COLONOSCOPY      ENDOSCOPY, COLON, DIAGNOSTIC      HYSTERECTOMY (CERVIX STATUS UNKNOWN)      LYMPH NODE BIOPSY             MEDS:  Scheduled Meds:  Continuous Infusions:  PRN Meds:  Current Meds:  Current Outpatient Medications:     ELIQUIS 5 MG TABS tablet, Take 1 tablet by mouth 2 times daily, Disp: 180 tablet, Rfl: 2    amLODIPine (NORVASC) 5 MG tablet, Take 1 tablet by mouth daily, Disp: 30 tablet, Rfl: 2    sotalol (BETAPACE) 80 MG tablet, Take 1 tablet by mouth 2 times daily, Disp: 60 tablet, Rfl: 1    pregabalin (LYRICA) 50 MG capsule, Take 1 capsule by mouth 3 times daily. Once during the day and two at night per pt, Disp: , Rfl:     lidocaine 4 % external patch, Place 1 patch onto the skin daily, Disp: 1 box, Rfl: 1    famotidine (PEPCID) 20 MG tablet, Take 1 tablet by mouth 2 times daily as needed, Disp: , Rfl:     levothyroxine (SYNTHROID) 25 MCG tablet, TAKE ONE TABLET BY MOUTH DAILY ALONG WITH 200 MCG TABLET (225 MCG TOTAL), Disp: 90 tablet, Rfl: 0    escitalopram (LEXAPRO) 20 MG tablet, Take 1 tablet by mouth nightly Unsure of mg amount, Disp: 30 tablet, Rfl: 3    nortriptyline (PAMELOR) 75 MG capsule, 100 mg nightly 2 50 mg caps at bed time per pt, Disp: , Rfl:     LORazepam (ATIVAN) 0.5 MG tablet, Take 0.5 mg by mouth every 6 hours as needed for Anxiety. , Disp: , Rfl:     HYDROcodone-acetaminophen (NORCO) 5-325 MG per tablet, Take 1 tablet by mouth every 6 hours as needed for Pain., Disp: , Rfl:     Misc. Devices [facility]) MISC, Use with walking or standing at all times to prevent falls. , Disp: 1 each, Rfl: 0    methocarbamol (ROBAXIN) 500 MG tablet, TAKE 1 TABLET BY MOUTH 4 TIMES DAILY AS NEEDED (PAIN), Disp: 60 tablet, Rfl: 2    omeprazole (PRILOSEC) 40 MG delayed release capsule, TAKE ONE CAPSULE BY MOUTH ONE TIME DAILY, Disp: 90 capsule, Rfl: 1    fluticasone (FLONASE) 50 MCG/ACT nasal spray, 2 sprays by Nasal route daily, Disp: 1 Bottle, Rfl: 0    vitamin D (CHOLECALCIFEROL) 5000 units CAPS capsule, Take 5,000 Units by mouth daily, Disp: , Rfl:     Coenzyme Q10 (COQ-10 PO), Take by mouth daily, Disp: , Rfl:     Calcium-Magnesium-Vitamin D 185- MG-MG-UNIT CAPS, Take 1 capsule by mouth daily, Disp: , Rfl:     acetaminophen (TYLENOL) 325 MG tablet, Take 650 mg by mouth every 6 hours as needed for Pain, Disp: , Rfl:     therapeutic multivitamin-minerals (THERAGRAN-M) tablet, Take 1 tablet by mouth daily. , Disp: , Rfl:     Omega-3 Fatty Acids (FISH OIL) 1000 MG CAPS, Take 1,000 mg by mouth daily. , Disp: , Rfl:         ALLERGIES:  Allergies   Allergen Reactions    Cephalosporins      Cough and congestion    Dilaudid [Hydromorphone Hcl] Itching     Nervous, unable to sleep    Fortaz [Ceftazidime]      Cough and congestion    Statins Other (See Comments)     Muscle cramping/weakness         Social History:   Social History     Socioeconomic History    Marital status:       Spouse name: Not on file    Number of children: Not on file    Years of education: Not on file    Highest education level: Not on file   Occupational History    Not on file   Tobacco Use    Smoking status: Former     Types: Cigarettes     Quit date: 2009     Years since quittin.7    Smokeless tobacco: Never    Tobacco comments:     quit    Vaping Use    Vaping Use: Some days    Substances: THC   Substance and Sexual Activity    Alcohol use: No    Drug use: No    Sexual activity: Not on file   Other Topics Concern    Not on file   Social History Narrative    Not on file     Social Determinants of Health     Financial Resource Strain: Not on file   Food Insecurity: Not on file   Transportation Needs: Not on file   Physical Activity: Not on file   Stress: Not on file   Social Connections: Not on file   Intimate Partner Violence: Not on file   Housing Stability: Not on file         Family History:   Cancer-related family history is not on file. Review of Systems:  No personal history of DVT, PE. 12 point ROS otherwise negative other than reported in HPI. Physical Examination:  Patient is alert and oriented x 3 and appears well nourished and appropriate for today's visit. Height:   Ht Readings from Last 3 Encounters:   08/22/22 5' 6\" (1.676 m)   06/03/22 5' 8\" (1.727 m)   02/05/21 5' 8\" (1.727 m)     Weight:   Wt Readings from Last 3 Encounters:   08/22/22 (!) 314 lb 9.6 oz (142.7 kg)   08/11/22 (!) 322 lb 6.4 oz (146.2 kg)   06/06/22 (!) 311 lb (141.1 kg)     Gait: The patient walks with antalgic gait. Right Knee: no effusion             Ligaments stable to varus/valgus stress at full extension and 30 degrees. AROM Right:  deg               Positive patellofemoral crepitus             Positive medial/lateral joint line tenderness     Left knee: no effusion             Ligaments stable to varus/valgus stress at full extension and 30 degrees. AROM: L:  Deg             Positive patellofemoral crepitus             Positive medial/lateral joint line tenderness      Radiographs: Standing AP bilateral, AP/lateral Left knee: Imaging was reviewed with the patient. There are severe degenerative changes of the bilateral knees. There is  joint space narrowing, subchondral sclerosis, and osteophyte formation. There is no radiographic evidence of AVN, fracture, or dislocation. ??  Non-Operative Treatment      Assessment and Plan?: The patient has advanced degenerative changes of the BILATERAL  knee     Had a discussion with the patient concerning her elevated BMI and perioperative risk with total knee arthroplasty. Her current BMI is about 51 we discussed weight loss measures including nutritional modifications as well as surgery.    In the meantime, we have recommended tylenol, activity modification and weight loss. Joint Injection: risks and benefits were discussed with the patient, after preparation of the injection site with alcohol, a combination of 1cc kenelog and 4cc marcaine totaling 5 cc was injected into the BILATERAL KNEE joint. For OA. The procedure was tolerated well without adverse reaction. The patient was counseled on potential reactions to the injection and given information on follow up and when to seek medical attention. The patient will monitor how long relief persists. We will see the patient back in 6 months or sooner if symptoms worsen.       ?___________________________   Danielle Juarez MD  ?   ??cc: Sukhjinder Tejeda MD

## 2022-09-12 ENCOUNTER — TELEPHONE (OUTPATIENT)
Dept: CARDIOLOGY CLINIC | Age: 67
End: 2022-09-12

## 2022-09-12 ENCOUNTER — OFFICE VISIT (OUTPATIENT)
Dept: CARDIOLOGY CLINIC | Age: 67
End: 2022-09-12
Payer: MEDICARE

## 2022-09-12 VITALS
WEIGHT: 293 LBS | BODY MASS INDEX: 50.71 KG/M2 | HEART RATE: 76 BPM | DIASTOLIC BLOOD PRESSURE: 70 MMHG | SYSTOLIC BLOOD PRESSURE: 130 MMHG

## 2022-09-12 DIAGNOSIS — I48.0 PAF (PAROXYSMAL ATRIAL FIBRILLATION) (HCC): ICD-10-CM

## 2022-09-12 DIAGNOSIS — I10 HYPERTENSION, UNSPECIFIED TYPE: ICD-10-CM

## 2022-09-12 DIAGNOSIS — R07.9 CHEST PAIN, UNSPECIFIED TYPE: Primary | ICD-10-CM

## 2022-09-12 PROCEDURE — 1123F ACP DISCUSS/DSCN MKR DOCD: CPT | Performed by: INTERNAL MEDICINE

## 2022-09-12 PROCEDURE — G8417 CALC BMI ABV UP PARAM F/U: HCPCS | Performed by: INTERNAL MEDICINE

## 2022-09-12 PROCEDURE — 99214 OFFICE O/P EST MOD 30 MIN: CPT | Performed by: INTERNAL MEDICINE

## 2022-09-12 PROCEDURE — G8427 DOCREV CUR MEDS BY ELIG CLIN: HCPCS | Performed by: INTERNAL MEDICINE

## 2022-09-12 PROCEDURE — 3017F COLORECTAL CA SCREEN DOC REV: CPT | Performed by: INTERNAL MEDICINE

## 2022-09-12 PROCEDURE — 93000 ELECTROCARDIOGRAM COMPLETE: CPT | Performed by: INTERNAL MEDICINE

## 2022-09-12 PROCEDURE — 1036F TOBACCO NON-USER: CPT | Performed by: INTERNAL MEDICINE

## 2022-09-12 PROCEDURE — G8400 PT W/DXA NO RESULTS DOC: HCPCS | Performed by: INTERNAL MEDICINE

## 2022-09-12 PROCEDURE — 1090F PRES/ABSN URINE INCON ASSESS: CPT | Performed by: INTERNAL MEDICINE

## 2022-09-12 RX ORDER — NITROGLYCERIN 0.4 MG/1
0.4 TABLET SUBLINGUAL EVERY 5 MIN PRN
Qty: 25 TABLET | Refills: 3 | Status: SHIPPED | OUTPATIENT
Start: 2022-09-12

## 2022-09-12 RX ORDER — ISOSORBIDE MONONITRATE 30 MG/1
30 TABLET, EXTENDED RELEASE ORAL DAILY
Qty: 30 TABLET | Refills: 3 | Status: SHIPPED | OUTPATIENT
Start: 2022-09-12 | End: 2022-09-14 | Stop reason: SINTOL

## 2022-09-12 RX ORDER — AMLODIPINE BESYLATE 5 MG/1
5 TABLET ORAL DAILY
Qty: 90 TABLET | Refills: 2 | Status: SHIPPED | OUTPATIENT
Start: 2022-09-12

## 2022-09-12 ASSESSMENT — ENCOUNTER SYMPTOMS
ORTHOPNEA: 0
EYES NEGATIVE: 1
COUGH: 0
ALLERGIC/IMMUNOLOGIC NEGATIVE: 1
SHORTNESS OF BREATH: 1
WHEEZING: 0
GASTROINTESTINAL NEGATIVE: 1
BACK PAIN: 1
SPUTUM PRODUCTION: 0
SNORING: 0
SLEEP DISTURBANCES DUE TO BREATHING: 0
HEMOPTYSIS: 0

## 2022-09-12 NOTE — PROGRESS NOTES
is soft. There is no mass. Tenderness: There is no abdominal tenderness. Hernia: No hernia is present. Musculoskeletal:         General: Normal range of motion. Cervical back: Normal range of motion. Skin:     General: Skin is warm and dry. Capillary Refill: Capillary refill takes 2 to 3 seconds. Neurological:      General: No focal deficit present. Mental Status: She is alert and oriented to person, place, and time. Psychiatric:         Mood and Affect: Mood normal.         Behavior: Behavior normal.       Diagnosis Orders   1. Chest pain, unspecified type  EKG 12 Lead      2. Hypertension, unspecified type        3. PAF (paroxysmal atrial fibrillation) (HCC)              AF:  Need to start eliquis 5 bid and try cardioversion in a month. HTN:  Lisinopril. Controlled. Hypothyroidism:  Trying to regulate. AS: stable repeat echo after SR is restored with cardioversion. Chest pain:  ECG unchanged. Start imdur 30 mg daily. Nitro sl.

## 2022-09-12 NOTE — TELEPHONE ENCOUNTER
Michelle from [facility] calling regarding refill on amLODIPine (NORVASC) 5 MG tablet. Last OV 8.11.22. next OV today labs done 6.6.22

## 2022-09-13 ENCOUNTER — TELEPHONE (OUTPATIENT)
Dept: CARDIOLOGY CLINIC | Age: 67
End: 2022-09-13

## 2022-09-13 NOTE — TELEPHONE ENCOUNTER
Imdur started yesterday for CP. She checks her BP daily at home and typically runs 130/70. After the Imdur, it dropped to 94/40 and 100/51. She felt fatigued, denies LHD/dizziness.

## 2022-09-13 NOTE — TELEPHONE ENCOUNTER
Pt was placed on IMDUR yesterday. She said within hours of starting it was she started to be very fatigued.  At 1:46 her BP was 94/40 and 3pm 100/51    Please advise

## 2022-09-14 NOTE — TELEPHONE ENCOUNTER
Relayed CG's advice; however, pt does not wish to take Imdur at all. She states that she felt so horrible yesterday and also developed welts and itching last night, requiring Benadryl. Nothing else has changed, so she assumes it it the Imdur. Pt states that she has NTG and can take that for CP if needed.

## 2022-12-09 NOTE — TELEPHONE ENCOUNTER
-- DO NOT REPLY / DO NOT REPLY ALL --  -- Message is from [facility] [employer] ([facility]) --    General Patient Message Patient missed a call from [facility]. Please contact the patient.     Caller Information       Type Contact Phone/Fax    12/09/2022 11:57 AM CST In Person (Incoming) Chavez Kaba (Self)         Alternative phone number: none     Can a detailed message be left? Yes    Message Turnaround:     Is it Working Hours? Yes - Working Hours     [place]:    Please give this turnaround time to the caller:   \"This message will be sent to [state Provider's name]. The clinical team will fulfill your request as soon as they review your message.\"                 Continued refills come through via fax, but I see message below. Can your MA call pharmacy to inform them that pt is no longer under your care? Thanks!

## 2023-01-23 ENCOUNTER — APPOINTMENT (OUTPATIENT)
Dept: CT IMAGING | Age: 68
DRG: 884 | End: 2023-01-23
Payer: MEDICARE

## 2023-01-23 ENCOUNTER — HOSPITAL ENCOUNTER (INPATIENT)
Age: 68
LOS: 2 days | Discharge: NURSING, REHAB OR LONG-TERM CARE | DRG: 884 | End: 2023-01-25
Attending: EMERGENCY MEDICINE | Admitting: INTERNAL MEDICINE
Payer: MEDICARE

## 2023-01-23 ENCOUNTER — APPOINTMENT (OUTPATIENT)
Dept: GENERAL RADIOLOGY | Age: 68
DRG: 884 | End: 2023-01-23
Payer: MEDICARE
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
## 2023-01-23 DIAGNOSIS — F41.9 ANXIETY: ICD-10-CM

## 2023-01-23 DIAGNOSIS — T07.XXXA MULTIPLE CONTUSIONS: ICD-10-CM

## 2023-01-23 DIAGNOSIS — Z79.01 ANTICOAGULATED: ICD-10-CM

## 2023-01-23 DIAGNOSIS — M25.561 RIGHT KNEE PAIN, UNSPECIFIED CHRONICITY: ICD-10-CM

## 2023-01-23 DIAGNOSIS — R26.2 UNABLE TO AMBULATE: Primary | ICD-10-CM

## 2023-01-23 PROBLEM — R29.6 FREQUENT FALLS: Status: ACTIVE | Noted: 2023-01-23

## 2023-01-23 LAB
A/G RATIO: 1.2 (ref 1.1–2.2)
ALBUMIN SERPL-MCNC: 3.6 G/DL (ref 3.4–5)
ALP BLD-CCNC: 60 U/L (ref 40–129)
ALT SERPL-CCNC: 12 U/L (ref 10–40)
ANION GAP SERPL CALCULATED.3IONS-SCNC: 10 MMOL/L (ref 3–16)
AST SERPL-CCNC: 14 U/L (ref 15–37)
BASOPHILS ABSOLUTE: 0.1 K/UL (ref 0–0.2)
BASOPHILS RELATIVE PERCENT: 0.5 %
BILIRUB SERPL-MCNC: 0.3 MG/DL (ref 0–1)
BUN BLDV-MCNC: 18 MG/DL (ref 7–20)
CALCIUM SERPL-MCNC: 8.9 MG/DL (ref 8.3–10.6)
CHLORIDE BLD-SCNC: 102 MMOL/L (ref 99–110)
CO2: 27 MMOL/L (ref 21–32)
CREAT SERPL-MCNC: 0.8 MG/DL (ref 0.6–1.2)
EOSINOPHILS ABSOLUTE: 0.1 K/UL (ref 0–0.6)
EOSINOPHILS RELATIVE PERCENT: 1 %
GFR SERPL CREATININE-BSD FRML MDRD: >60 ML/MIN/{1.73_M2}
GLUCOSE BLD-MCNC: 115 MG/DL (ref 70–99)
HCT VFR BLD CALC: 41.9 % (ref 36–48)
HEMOGLOBIN: 13.5 G/DL (ref 12–16)
INR BLD: 1.14 (ref 0.87–1.14)
LYMPHOCYTES ABSOLUTE: 1.9 K/UL (ref 1–5.1)
LYMPHOCYTES RELATIVE PERCENT: 18 %
MCH RBC QN AUTO: 26.2 PG (ref 26–34)
MCHC RBC AUTO-ENTMCNC: 32.2 G/DL (ref 31–36)
MCV RBC AUTO: 81.3 FL (ref 80–100)
MONOCYTES ABSOLUTE: 0.6 K/UL (ref 0–1.3)
MONOCYTES RELATIVE PERCENT: 5.8 %
NEUTROPHILS ABSOLUTE: 7.8 K/UL (ref 1.7–7.7)
NEUTROPHILS RELATIVE PERCENT: 74.7 %
PDW BLD-RTO: 15 % (ref 12.4–15.4)
PLATELET # BLD: 347 K/UL (ref 135–450)
PMV BLD AUTO: 7.8 FL (ref 5–10.5)
POTASSIUM REFLEX MAGNESIUM: 4.4 MMOL/L (ref 3.5–5.1)
PROTHROMBIN TIME: 14.6 SEC (ref 11.7–14.5)
RBC # BLD: 5.15 M/UL (ref 4–5.2)
SODIUM BLD-SCNC: 139 MMOL/L (ref 136–145)
TOTAL PROTEIN: 6.7 G/DL (ref 6.4–8.2)
TROPONIN: <0.01 NG/ML
WBC # BLD: 10.4 K/UL (ref 4–11)

## 2023-01-23 PROCEDURE — 2580000003 HC RX 258: Performed by: INTERNAL MEDICINE

## 2023-01-23 PROCEDURE — 84484 ASSAY OF TROPONIN QUANT: CPT

## 2023-01-23 PROCEDURE — 6370000000 HC RX 637 (ALT 250 FOR IP): Performed by: INTERNAL MEDICINE

## 2023-01-23 PROCEDURE — 99285 EMERGENCY DEPT VISIT HI MDM: CPT

## 2023-01-23 PROCEDURE — 73552 X-RAY EXAM OF FEMUR 2/>: CPT

## 2023-01-23 PROCEDURE — 6360000002 HC RX W HCPCS: Performed by: EMERGENCY MEDICINE

## 2023-01-23 PROCEDURE — 80053 COMPREHEN METABOLIC PANEL: CPT

## 2023-01-23 PROCEDURE — 74177 CT ABD & PELVIS W/CONTRAST: CPT

## 2023-01-23 PROCEDURE — 85025 COMPLETE CBC W/AUTO DIFF WBC: CPT

## 2023-01-23 PROCEDURE — 96374 THER/PROPH/DIAG INJ IV PUSH: CPT

## 2023-01-23 PROCEDURE — 72125 CT NECK SPINE W/O DYE: CPT

## 2023-01-23 PROCEDURE — 6360000004 HC RX CONTRAST MEDICATION: Performed by: EMERGENCY MEDICINE

## 2023-01-23 PROCEDURE — 85610 PROTHROMBIN TIME: CPT

## 2023-01-23 PROCEDURE — 93005 ELECTROCARDIOGRAM TRACING: CPT | Performed by: EMERGENCY MEDICINE

## 2023-01-23 PROCEDURE — 1200000000 HC SEMI PRIVATE

## 2023-01-23 PROCEDURE — 3209999900 CT LUMBAR SPINE TRAUMA RECONSTRUCTION

## 2023-01-23 PROCEDURE — 70450 CT HEAD/BRAIN W/O DYE: CPT

## 2023-01-23 PROCEDURE — 73560 X-RAY EXAM OF KNEE 1 OR 2: CPT

## 2023-01-23 PROCEDURE — 71045 X-RAY EXAM CHEST 1 VIEW: CPT

## 2023-01-23 PROCEDURE — 6370000000 HC RX 637 (ALT 250 FOR IP): Performed by: EMERGENCY MEDICINE

## 2023-01-23 PROCEDURE — 73030 X-RAY EXAM OF SHOULDER: CPT

## 2023-01-23 RX ORDER — AMLODIPINE BESYLATE 5 MG/1
5 TABLET ORAL DAILY
Status: DISCONTINUED | OUTPATIENT
Start: 2023-01-24 | End: 2023-01-25 | Stop reason: HOSPADM

## 2023-01-23 RX ORDER — OXYCODONE HYDROCHLORIDE AND ACETAMINOPHEN 5; 325 MG/1; MG/1
2 TABLET ORAL ONCE
Status: DISCONTINUED | OUTPATIENT
Start: 2023-01-23 | End: 2023-01-23

## 2023-01-23 RX ORDER — ORPHENADRINE CITRATE 30 MG/ML
60 INJECTION INTRAMUSCULAR; INTRAVENOUS ONCE
Status: COMPLETED | OUTPATIENT
Start: 2023-01-23 | End: 2023-01-23

## 2023-01-23 RX ORDER — SODIUM CHLORIDE 0.9 % (FLUSH) 0.9 %
5-40 SYRINGE (ML) INJECTION EVERY 12 HOURS SCHEDULED
Status: DISCONTINUED | OUTPATIENT
Start: 2023-01-23 | End: 2023-01-25 | Stop reason: HOSPADM

## 2023-01-23 RX ORDER — SODIUM CHLORIDE 0.9 % (FLUSH) 0.9 %
5-40 SYRINGE (ML) INJECTION PRN
Status: DISCONTINUED | OUTPATIENT
Start: 2023-01-23 | End: 2023-01-25 | Stop reason: HOSPADM

## 2023-01-23 RX ORDER — NORTRIPTYLINE HYDROCHLORIDE 25 MG/1
100 CAPSULE ORAL NIGHTLY
Status: DISCONTINUED | OUTPATIENT
Start: 2023-01-23 | End: 2023-01-25 | Stop reason: HOSPADM

## 2023-01-23 RX ORDER — SOTALOL HYDROCHLORIDE 80 MG/1
80 TABLET ORAL 2 TIMES DAILY
Status: DISCONTINUED | OUTPATIENT
Start: 2023-01-23 | End: 2023-01-25 | Stop reason: HOSPADM

## 2023-01-23 RX ORDER — PANTOPRAZOLE SODIUM 40 MG/1
40 TABLET, DELAYED RELEASE ORAL
Status: DISCONTINUED | OUTPATIENT
Start: 2023-01-24 | End: 2023-01-23

## 2023-01-23 RX ORDER — OXYCODONE HYDROCHLORIDE AND ACETAMINOPHEN 5; 325 MG/1; MG/1
2 TABLET ORAL ONCE
Status: COMPLETED | OUTPATIENT
Start: 2023-01-23 | End: 2023-01-23

## 2023-01-23 RX ORDER — TRAMADOL HYDROCHLORIDE 50 MG/1
50 TABLET ORAL ONCE
Status: COMPLETED | OUTPATIENT
Start: 2023-01-23 | End: 2023-01-23

## 2023-01-23 RX ORDER — LORAZEPAM 0.5 MG/1
0.5 TABLET ORAL EVERY 6 HOURS PRN
Status: DISCONTINUED | OUTPATIENT
Start: 2023-01-23 | End: 2023-01-25 | Stop reason: HOSPADM

## 2023-01-23 RX ORDER — METHOCARBAMOL 500 MG/1
500 TABLET, FILM COATED ORAL 4 TIMES DAILY PRN
Status: DISCONTINUED | OUTPATIENT
Start: 2023-01-23 | End: 2023-01-25 | Stop reason: HOSPADM

## 2023-01-23 RX ORDER — ONDANSETRON 2 MG/ML
4 INJECTION INTRAMUSCULAR; INTRAVENOUS EVERY 6 HOURS PRN
Status: DISCONTINUED | OUTPATIENT
Start: 2023-01-23 | End: 2023-01-25 | Stop reason: HOSPADM

## 2023-01-23 RX ORDER — TRAMADOL HYDROCHLORIDE 50 MG/1
50 TABLET ORAL ONCE
Status: DISCONTINUED | OUTPATIENT
Start: 2023-01-23 | End: 2023-01-23

## 2023-01-23 RX ORDER — ACETAMINOPHEN 650 MG/1
650 SUPPOSITORY RECTAL EVERY 6 HOURS PRN
Status: DISCONTINUED | OUTPATIENT
Start: 2023-01-23 | End: 2023-01-25 | Stop reason: HOSPADM

## 2023-01-23 RX ORDER — ONDANSETRON 4 MG/1
4 TABLET, ORALLY DISINTEGRATING ORAL EVERY 8 HOURS PRN
Status: DISCONTINUED | OUTPATIENT
Start: 2023-01-23 | End: 2023-01-25 | Stop reason: HOSPADM

## 2023-01-23 RX ORDER — POLYETHYLENE GLYCOL 3350 17 G/17G
17 POWDER, FOR SOLUTION ORAL DAILY PRN
Status: DISCONTINUED | OUTPATIENT
Start: 2023-01-23 | End: 2023-01-25 | Stop reason: HOSPADM

## 2023-01-23 RX ORDER — PREGABALIN 50 MG/1
50 CAPSULE ORAL 3 TIMES DAILY
Status: DISCONTINUED | OUTPATIENT
Start: 2023-01-23 | End: 2023-01-25 | Stop reason: HOSPADM

## 2023-01-23 RX ORDER — SODIUM CHLORIDE 9 MG/ML
INJECTION, SOLUTION INTRAVENOUS PRN
Status: DISCONTINUED | OUTPATIENT
Start: 2023-01-23 | End: 2023-01-25 | Stop reason: HOSPADM

## 2023-01-23 RX ORDER — ORPHENADRINE CITRATE 30 MG/ML
60 INJECTION INTRAMUSCULAR; INTRAVENOUS ONCE
Status: DISCONTINUED | OUTPATIENT
Start: 2023-01-23 | End: 2023-01-23

## 2023-01-23 RX ORDER — ENOXAPARIN SODIUM 100 MG/ML
30 INJECTION SUBCUTANEOUS NIGHTLY
Status: DISCONTINUED | OUTPATIENT
Start: 2023-01-23 | End: 2023-01-23

## 2023-01-23 RX ORDER — ACETAMINOPHEN 325 MG/1
650 TABLET ORAL EVERY 6 HOURS PRN
Status: DISCONTINUED | OUTPATIENT
Start: 2023-01-23 | End: 2023-01-25 | Stop reason: HOSPADM

## 2023-01-23 RX ADMIN — APIXABAN 5 MG: 5 TABLET, FILM COATED ORAL at 22:18

## 2023-01-23 RX ADMIN — IOPAMIDOL 75 ML: 755 INJECTION, SOLUTION INTRAVENOUS at 17:06

## 2023-01-23 RX ADMIN — PREGABALIN 50 MG: 50 CAPSULE ORAL at 22:18

## 2023-01-23 RX ADMIN — TRAMADOL HYDROCHLORIDE 50 MG: 50 TABLET ORAL at 16:24

## 2023-01-23 RX ADMIN — Medication 10 ML: at 22:20

## 2023-01-23 RX ADMIN — NORTRIPTYLINE HYDROCHLORIDE 100 MG: 25 CAPSULE ORAL at 22:18

## 2023-01-23 RX ADMIN — ORPHENADRINE CITRATE 60 MG: 30 INJECTION INTRAMUSCULAR; INTRAVENOUS at 16:24

## 2023-01-23 RX ADMIN — SOTALOL HYDROCHLORIDE 80 MG: 80 TABLET ORAL at 22:19

## 2023-01-23 RX ADMIN — OXYCODONE AND ACETAMINOPHEN 2 TABLET: 5; 325 TABLET ORAL at 18:33

## 2023-01-23 ASSESSMENT — PAIN SCALES - GENERAL
PAINLEVEL_OUTOF10: 7
PAINLEVEL_OUTOF10: 6
PAINLEVEL_OUTOF10: 6
PAINLEVEL_OUTOF10: 8
PAINLEVEL_OUTOF10: 6

## 2023-01-23 ASSESSMENT — PAIN DESCRIPTION - DESCRIPTORS: DESCRIPTORS: THROBBING

## 2023-01-23 ASSESSMENT — PAIN - FUNCTIONAL ASSESSMENT: PAIN_FUNCTIONAL_ASSESSMENT: 0-10

## 2023-01-23 ASSESSMENT — PAIN DESCRIPTION - LOCATION
LOCATION: KNEE
LOCATION: BACK;LEG

## 2023-01-23 ASSESSMENT — LIFESTYLE VARIABLES: HOW OFTEN DO YOU HAVE A DRINK CONTAINING ALCOHOL: NEVER

## 2023-01-23 ASSESSMENT — PAIN DESCRIPTION - ORIENTATION
ORIENTATION: RIGHT
ORIENTATION: RIGHT

## 2023-01-23 ASSESSMENT — PAIN DESCRIPTION - PAIN TYPE: TYPE: CHRONIC PAIN

## 2023-01-23 NOTE — ED NOTES
This nurse attempted to ambulate with pt with walker to restroom. Pt with increased pain and is unsteady.  Dr My Leonardo notified     Robyn Leal RN  01/23/23 8070

## 2023-01-23 NOTE — ED PROVIDER NOTES
[facility]) Emergency [address]    Nitin Snider MD, am the primary clinician of record. CHIEF COMPLAINT  Chief Complaint   Patient presents with    Knee Pain     Arrived via [address] [facility] from home d/t right knee pain, fall on right side earlier today; cortisone shot to bilateral knees last 5-6 months ago; right buttocks and back pain that feels different than normal; Eliquis unable to state if LOC; previous fall a month ago; hx of fibromyalgia      Fall      HISTORY OF PRESENT ILLNESS  Robbie Fisher is a 69 y.o. female  who presents to the ED complaining of a fall today. She complains primarily of right knee pain and has a history of surgery there. She also says she has injured her right side of her abdomen and flank and the right side of her low back. She says she has no injuries at all on the left side of her body. Although she did not hit her head and denies any acute neck pain she is anticoagulated on Eliquis. She also has a history of fibromyalgia. She typically uses a cane or walker when outside of her home but when in her home she does not require that type of assistance and was walking independently when the fall occurred today. She did not have any loss of consciousness or dizziness. She says she fell about a month ago as well. She also has some R shoulder pain. No other complaints, modifying factors or associated symptoms. I have reviewed the following from the nursing documentation.     Past Medical History:   Diagnosis Date    Aortic stenosis     Arthritis     Coronary artery disease     Depression     Fatigue     Fibromyalgia     Hyperlipidemia     Hypertension     Hypothyroidism     Pancreatitis     Type II or unspecified type diabetes mellitus without mention of complication, not stated as uncontrolled      Past Surgical History:   Procedure Laterality Date    ANKLE FRACTURE SURGERY      right     SECTION      CHOLECYSTECTOMY, LAPAROSCOPIC 2014    LAPAROSCOPIC CHOLECYSTECTOMY WITH INTRAOPERATIVE    COLONOSCOPY      ENDOSCOPY, COLON, DIAGNOSTIC      HYSTERECTOMY (CERVIX STATUS UNKNOWN)      LYMPH NODE BIOPSY  1's     Family History   Problem Relation Age of Onset    Diabetes Mother     Hypertension Mother     Stroke Father     Heart Disease Brother         aortic stenosis     Social History     Socioeconomic History    Marital status:       Spouse name: Not on file    Number of children: Not on file    Years of education: Not on file    Highest education level: Not on file   Occupational History    Not on file   Tobacco Use    Smoking status: Former     Types: Cigarettes     Quit date: 2009     Years since quittin.1    Smokeless tobacco: Never    Tobacco comments:     quit    Vaping Use    Vaping Use: Some days    Substances: THC   Substance and Sexual Activity    Alcohol use: No    Drug use: Yes     Types: Marijuana Candiss Littler)     Comment: Medical    Sexual activity: Not on file   Other Topics Concern    Not on file   Social History Narrative    Not on file     Social Determinants of Health     Financial Resource Strain: Not on file   Food Insecurity: Not on file   Transportation Needs: Not on file   Physical Activity: Not on file   Stress: Not on file   Social Connections: Not on file   Intimate Partner Violence: Not on file   Housing Stability: Not on file     Current Facility-Administered Medications   Medication Dose Route Frequency Provider Last Rate Last Admin    oxyCODONE-acetaminophen (PERCOCET) 5-325 MG per tablet 2 tablet  2 tablet Oral Once Angel Bello MD         Current Outpatient Medications   Medication Sig Dispense Refill    amLODIPine (NORVASC) 5 MG tablet Take 1 tablet by mouth daily 90 tablet 2    nitroGLYCERIN (NITROSTAT) 0.4 MG SL tablet Place 1 tablet under the tongue every 5 minutes as needed for Chest pain 25 tablet 3    ELIQUIS 5 MG TABS tablet Take 1 tablet by mouth 2 times daily 180 tablet 2 sotalol (BETAPACE) 80 MG tablet Take 1 tablet by mouth 2 times daily 60 tablet 1    pregabalin (LYRICA) 50 MG capsule Take 1 capsule by mouth 3 times daily. Once during the day and two at night per pt      lidocaine 4 % external patch Place 1 patch onto the skin daily (Patient not taking: Reported on 1/23/2023) 1 box 1    famotidine (PEPCID) 20 MG tablet Take 1 tablet by mouth 2 times daily as needed      levothyroxine (SYNTHROID) 25 MCG tablet TAKE ONE TABLET BY MOUTH DAILY ALONG WITH 200 MCG TABLET (225 MCG TOTAL) 90 tablet 0    escitalopram (LEXAPRO) 20 MG tablet Take 1 tablet by mouth nightly Unsure of mg amount (Patient not taking: Reported on 1/23/2023) 30 tablet 3    nortriptyline (PAMELOR) 75 MG capsule 100 mg nightly 2 50 mg caps at bed time per pt      LORazepam (ATIVAN) 0.5 MG tablet Take 0.5 mg by mouth every 6 hours as needed for Anxiety. HYDROcodone-acetaminophen (NORCO) 5-325 MG per tablet Take 1 tablet by mouth every 6 hours as needed for Pain. (Patient not taking: Reported on 1/23/2023)      Misc. Devices [facility]) MISC Use with walking or standing at all times to prevent falls. 1 each 0    methocarbamol (ROBAXIN) 500 MG tablet TAKE 1 TABLET BY MOUTH 4 TIMES DAILY AS NEEDED (PAIN) 60 tablet 2    omeprazole (PRILOSEC) 40 MG delayed release capsule TAKE ONE CAPSULE BY MOUTH ONE TIME DAILY (Patient not taking: Reported on 1/23/2023) 90 capsule 1    fluticasone (FLONASE) 50 MCG/ACT nasal spray 2 sprays by Nasal route daily 1 Bottle 0    vitamin D (CHOLECALCIFEROL) 5000 units CAPS capsule Take 5,000 Units by mouth daily      Coenzyme Q10 (COQ-10 PO) Take by mouth daily      Calcium-Magnesium-Vitamin D 185- MG-MG-UNIT CAPS Take 1 capsule by mouth daily      acetaminophen (TYLENOL) 325 MG tablet Take 650 mg by mouth every 6 hours as needed for Pain      therapeutic multivitamin-minerals (THERAGRAN-M) tablet Take 1 tablet by mouth daily.       Omega-3 Fatty Acids (FISH OIL) 1000 MG CAPS Take 1,000 mg by mouth daily. Allergies   Allergen Reactions    Cephalosporins      Cough and congestion    Dilaudid [Hydromorphone Hcl] Itching     Nervous, unable to sleep    Fortaz [Ceftazidime]      Cough and congestion    Statins Other (See Comments)     Muscle cramping/weakness       REVIEW OF SYSTEMS  10 systems reviewed, pertinent positives per HPI otherwise noted to be negative. PHYSICAL EXAM  BP (!) 157/80   Pulse 76   Temp 98.6 °F (37 °C) (Oral)   Resp 20   Ht 5' 8\" (1.727 m)   Wt (!) 303 lb (137.4 kg)   SpO2 96%   BMI 46.07 kg/m²    GENERAL APPEARANCE: Awake and alert. Cooperative. No distress. HENT: Normocephalic. Atraumatic. Mucous membranes are dry. NECK: Supple. BACK:      Cervical: no tenderness noted, no midline tenderness, no paraspinous spasm      Thoracic: no tenderness noted, no midline tenderness, no paraspinous spasm      Lumbar: midline and R paraspinal ttp, no bruising, no stepoff/deformity, neg SLR BLE. EYES: PERRL. EOM's grossly intact. HEART/CHEST: RRR. No murmurs. No chest wall tenderness. LUNGS: Respirations unlabored. CTAB. Good air exchange. Speaking comfortably in full sentences. ABDOMEN: R sided abd ttp and R flank ttp but no bruising present, no L sided abd or flank tenderness. Soft. Non-distended. No masses. No organomegaly. No guarding or rebound. Normal bowel sounds throughout. MUSCULOSKELETAL:  RUE: Mild ttp at the shoulder but no other RUE tenderness. 2+ radial pulse. Brisk cap refill x5 digits. Sensation and motor function fully intact in the radial, ulnar, and median nerve distribution. Full range of motion of all major joints. Cardinal movements of hand fully intact. No erythema, bruising, or lacerations. Comparments are soft. LUE:  No tenderness. 2+ radial pulse. Brisk cap refill x5 digits. Sensation and motor function fully intact in the radial, ulnar, and median nerve distribution. Full range of motion of all major joints.   Cardinal movements of hand fully intact. No erythema, bruising, or lacerations. Comparments are soft. RLE: Moderate right knee ttp and distal/mid thigh ttp but no bruising or deformity. No focal hip or any ankle/foot or lower shin/calf tenderness. 2+ DP and PT. Sensation and motor function fully intact. Full range of motion of all major joints. No erythema, bruising, or lacerations. Compartments are soft. 2+ patellar reflex. Achilles nontender and intact. Not able to bear weight. No joint swelling or effusions are noted. LLE: No tenderness. 2+ DP and PT. Sensation and motor function fully intact. Full range of motion of all major joints. No erythema, bruising, or lacerations. Compartments are soft. 2+ patellar reflex. Achilles nontender and intact. Able to bear weight. No joint swelling or effusions are noted. SKIN: Warm and dry. No acute rashes. NEUROLOGICAL: Alert and oriented. CN's 2-12 intact. No gross facial drooping. Strength 5/5, sensation intact. 2 plus DTR's in knees bilaterally. Gait unsteady even with walker. PSYCHIATRIC: Normal mood and affect. LABS  I have personally reviewed all labs for this visit.    Results for orders placed or performed during the hospital encounter of 01/23/23   CBC with Auto Differential   Result Value Ref Range    WBC 10.4 4.0 - 11.0 K/uL    RBC 5.15 4.00 - 5.20 M/uL    Hemoglobin 13.5 12.0 - 16.0 g/dL    Hematocrit 41.9 36.0 - 48.0 %    MCV 81.3 80.0 - 100.0 fL    MCH 26.2 26.0 - 34.0 pg    MCHC 32.2 31.0 - 36.0 g/dL    RDW 15.0 12.4 - 15.4 %    Platelets 058 817 - 558 K/uL    MPV 7.8 5.0 - 10.5 fL    Neutrophils % 74.7 %    Lymphocytes % 18.0 %    Monocytes % 5.8 %    Eosinophils % 1.0 %    Basophils % 0.5 %    Neutrophils Absolute 7.8 (H) 1.7 - 7.7 K/uL    Lymphocytes Absolute 1.9 1.0 - 5.1 K/uL    Monocytes Absolute 0.6 0.0 - 1.3 K/uL    Eosinophils Absolute 0.1 0.0 - 0.6 K/uL    Basophils Absolute 0.1 0.0 - 0.2 K/uL   Comprehensive Metabolic Panel w/ Reflex to MG   Result Value Ref Range    Sodium 139 136 - 145 mmol/L    Potassium reflex Magnesium 4.4 3.5 - 5.1 mmol/L    Chloride 102 99 - 110 mmol/L    CO2 27 21 - 32 mmol/L    Anion Gap 10 3 - 16    Glucose 115 (H) 70 - 99 mg/dL    BUN 18 7 - 20 mg/dL    Creatinine 0.8 0.6 - 1.2 mg/dL    Est, Glom Filt Rate >60 >60    Calcium 8.9 8.3 - 10.6 mg/dL    Total Protein 6.7 6.4 - 8.2 g/dL    Albumin 3.6 3.4 - 5.0 g/dL    Albumin/Globulin Ratio 1.2 1.1 - 2.2    Total Bilirubin 0.3 0.0 - 1.0 mg/dL    Alkaline Phosphatase 60 40 - 129 U/L    ALT 12 10 - 40 U/L    AST 14 (L) 15 - 37 U/L   Protime-INR   Result Value Ref Range    Protime 14.6 (H) 11.7 - 14.5 sec    INR 1.14 0.87 - 1.14   Troponin   Result Value Ref Range    Troponin <0.01 <0.01 ng/mL   EKG 12 Lead   Result Value Ref Range    Ventricular Rate 70 BPM    Atrial Rate 70 BPM    P-R Interval 154 ms    QRS Duration 110 ms    Q-T Interval 438 ms    QTc Calculation (Bazett) 473 ms    P Axis 56 degrees    R Axis -26 degrees    T Axis 38 degrees    Diagnosis Normal sinus rhythmNormal ECG        EKG performed in ED:  The 12 lead EKG was interpreted by me independent of a cardiologist as follows:  Rate: normal with a rate of 70  Rhythm: sinus  Axis: normal  Intervals: normal WA, narrow QRS, normal QTc  ST segments: no ST elevations or depressions  T waves: no abnormal inversions  Non-specific T wave changes: not present  Prior EKG comparison: EKG dated 9/12/22 is not significantly different    RADIOLOGY  Any applicable radiology studies including x-ray, CT, MRI, and/or ultrasound, were reviewed independently by me in addition to the radiologist.  I reviewed all radiology images and reports as well from this evaluation. XR SHOULDER RIGHT (MIN 2 VIEWS)    Result Date: 1/23/2023  No acute fracture or dislocation     XR FEMUR RIGHT (MIN 2 VIEWS)    Result Date: 1/23/2023  No acute osseous or soft tissue abnormality.  Degenerative change of the right hip and right knee joint spaces. XR KNEE RIGHT (1-2 VIEWS)    Result Date: 1/23/2023  1. No acute fracture is seen. 2.  Suprapatellar joint effusion. Tricompartmental osteoarthritis     CT HEAD WO CONTRAST    Result Date: 1/23/2023  No acute intracranial abnormality. CT CERVICAL SPINE WO CONTRAST    Result Date: 1/23/2023  No acute abnormality of the cervical spine. CT ABDOMEN PELVIS W IV CONTRAST Additional Contrast? None    Result Date: 1/23/2023  No acute process identified. Small hiatal hernia. Status post cholecystectomy and hysterectomy. XR CHEST PORTABLE    Result Date: 1/23/2023  No acute cardiopulmonary findings     CT LUMBAR SPINE TRAUMA RECONSTRUCTION    Result Date: 1/23/2023  Mild anterior wedging of the T12 vertebral body, in appearance suggestive of an old fracture. No acute fracture identified. ED COURSE/MDM  Patient seen and evaluated. Old records reviewed. Labs and imaging reviewed. After initial evaluation, differential diagnostic considerations included but not limited to: intracranial injury, cervical spine injury, chest/abdominal organ injury, extremity injury, abrasion/laceration, contusion, fracture, sprain/strain, dislocation    The patient's ED workup was notable for trauma with R shoulder, R knee, and R flank pain. Trauma imaging is all negative including CT head, Cspine, Lspine (old T12fx only), and CTAP. XR R shoulder/R knee also neg for fx. However she represents a high fall risk with anticoagulation on board, unable to ambulate with walker even after pain medication (PO tramadol/IV norflex) though is feeling a little better. She will be admitted for PT/OT and symptom control. Is this patient to be included in the SEP-1 Core Measure? No   Exclusion criteria - the patient is NOT to be included for SEP-1 Core Measure due to:   Infection is not suspected      Consults:  None    History obtained from: Patient and EMS    Pertinent social determinants of health: Lack of transportation    Chronic conditions potentially affecting care:  anticoagulation    Review of other records:  None    Reassessment:  See [facility] for details of medications given and reassessment    During the patient's ED course, the patient was given:  Medications   oxyCODONE-acetaminophen (PERCOCET) 5-325 MG per tablet 2 tablet (has no administration in time range)   orphenadrine (NORFLEX) injection 60 mg (60 mg IntraVENous Given 1/23/23 1624)   traMADol (ULTRAM) tablet 50 mg (50 mg Oral Given 1/23/23 1624)   iopamidol (ISOVUE-370) 76 % injection 75 mL (75 mLs IntraVENous Given 1/23/23 1706)        CLINICAL IMPRESSION  1. Unable to ambulate    2. Fall on same level from slipping, tripping or stumbling, initial encounter    3. Anticoagulated    4. Multiple contusions        Blood pressure (!) 157/80, pulse 76, temperature 98.6 °F (37 °C), temperature source Oral, resp. rate 20, height 5' 8\" (1.727 m), weight (!) 303 lb (137.4 kg), SpO2 96 %, not currently breastfeeding. [address] Demetrius Ryan was admitted in stable condition. The plan is to admit to the hospital at this time under the hospitalist service. Hospitalist accepted the patient and will take over the patient's care. Critical care time:  None    DISCLAIMER: This chart was created using Dragon dictation software. Efforts were made by me to ensure accuracy, however some errors may be present due to limitations of this technology and occasionally words are not transcribed correctly.         Janina Paulino MD  01/23/23 0568

## 2023-01-24 ENCOUNTER — APPOINTMENT (OUTPATIENT)
Dept: MRI IMAGING | Age: 68
DRG: 884 | End: 2023-01-24
Payer: MEDICARE

## 2023-01-24 LAB
BACTERIA: ABNORMAL /HPF
BILIRUBIN URINE: NEGATIVE
BLOOD, URINE: NEGATIVE
CLARITY: ABNORMAL
COLOR: ABNORMAL
EKG ATRIAL RATE: 70 BPM
EKG DIAGNOSIS: NORMAL
EKG P AXIS: 56 DEGREES
EKG P-R INTERVAL: 154 MS
EKG Q-T INTERVAL: 438 MS
EKG QRS DURATION: 110 MS
EKG QTC CALCULATION (BAZETT): 473 MS
EKG R AXIS: -26 DEGREES
EKG T AXIS: 38 DEGREES
EKG VENTRICULAR RATE: 70 BPM
EPITHELIAL CELLS, UA: 3 /HPF (ref 0–5)
FOLATE: 8.12 NG/ML (ref 4.78–24.2)
GLUCOSE URINE: NEGATIVE MG/DL
HYALINE CASTS: 2 /LPF (ref 0–8)
KETONES, URINE: ABNORMAL MG/DL
LEUKOCYTE ESTERASE, URINE: ABNORMAL
MICROSCOPIC EXAMINATION: YES
NITRITE, URINE: NEGATIVE
PH UA: 5 (ref 5–8)
PROTEIN UA: NEGATIVE MG/DL
RBC UA: 1 /HPF (ref 0–4)
SARS-COV-2, NAAT: NOT DETECTED
SPECIFIC GRAVITY UA: >=1.03 (ref 1–1.03)
TSH REFLEX: 1.41 UIU/ML (ref 0.27–4.2)
URINE REFLEX TO CULTURE: YES
URINE TYPE: ABNORMAL
UROBILINOGEN, URINE: 0.2 E.U./DL
VITAMIN B-12: 953 PG/ML (ref 211–911)
WBC UA: 97 /HPF (ref 0–5)

## 2023-01-24 PROCEDURE — 81001 URINALYSIS AUTO W/SCOPE: CPT

## 2023-01-24 PROCEDURE — 2580000003 HC RX 258: Performed by: INTERNAL MEDICINE

## 2023-01-24 PROCEDURE — 97530 THERAPEUTIC ACTIVITIES: CPT

## 2023-01-24 PROCEDURE — 97535 SELF CARE MNGMENT TRAINING: CPT

## 2023-01-24 PROCEDURE — 70551 MRI BRAIN STEM W/O DYE: CPT

## 2023-01-24 PROCEDURE — 87086 URINE CULTURE/COLONY COUNT: CPT

## 2023-01-24 PROCEDURE — 6370000000 HC RX 637 (ALT 250 FOR IP): Performed by: INTERNAL MEDICINE

## 2023-01-24 PROCEDURE — 97165 OT EVAL LOW COMPLEX 30 MIN: CPT

## 2023-01-24 PROCEDURE — 93880 EXTRACRANIAL BILAT STUDY: CPT

## 2023-01-24 PROCEDURE — 83036 HEMOGLOBIN GLYCOSYLATED A1C: CPT

## 2023-01-24 PROCEDURE — 97161 PT EVAL LOW COMPLEX 20 MIN: CPT

## 2023-01-24 PROCEDURE — 84443 ASSAY THYROID STIM HORMONE: CPT

## 2023-01-24 PROCEDURE — 99223 1ST HOSP IP/OBS HIGH 75: CPT | Performed by: PSYCHIATRY & NEUROLOGY

## 2023-01-24 PROCEDURE — 87077 CULTURE AEROBIC IDENTIFY: CPT

## 2023-01-24 PROCEDURE — 1200000000 HC SEMI PRIVATE

## 2023-01-24 PROCEDURE — 82746 ASSAY OF FOLIC ACID SERUM: CPT

## 2023-01-24 PROCEDURE — 87186 SC STD MICRODIL/AGAR DIL: CPT

## 2023-01-24 PROCEDURE — 97116 GAIT TRAINING THERAPY: CPT

## 2023-01-24 PROCEDURE — 36415 COLL VENOUS BLD VENIPUNCTURE: CPT

## 2023-01-24 PROCEDURE — 87635 SARS-COV-2 COVID-19 AMP PRB: CPT

## 2023-01-24 PROCEDURE — 6370000000 HC RX 637 (ALT 250 FOR IP): Performed by: NURSE PRACTITIONER

## 2023-01-24 PROCEDURE — 82607 VITAMIN B-12: CPT

## 2023-01-24 PROCEDURE — 93010 ELECTROCARDIOGRAM REPORT: CPT | Performed by: INTERNAL MEDICINE

## 2023-01-24 RX ORDER — TRAMADOL HYDROCHLORIDE 50 MG/1
50 TABLET ORAL EVERY 6 HOURS PRN
Status: DISCONTINUED | OUTPATIENT
Start: 2023-01-24 | End: 2023-01-25 | Stop reason: HOSPADM

## 2023-01-24 RX ADMIN — ACETAMINOPHEN 650 MG: 325 TABLET ORAL at 09:44

## 2023-01-24 RX ADMIN — PREGABALIN 50 MG: 50 CAPSULE ORAL at 08:18

## 2023-01-24 RX ADMIN — APIXABAN 5 MG: 5 TABLET, FILM COATED ORAL at 08:18

## 2023-01-24 RX ADMIN — SOTALOL HYDROCHLORIDE 80 MG: 80 TABLET ORAL at 08:18

## 2023-01-24 RX ADMIN — AMLODIPINE BESYLATE 5 MG: 5 TABLET ORAL at 08:18

## 2023-01-24 RX ADMIN — METHOCARBAMOL 500 MG: 500 TABLET ORAL at 09:44

## 2023-01-24 RX ADMIN — LORAZEPAM 0.5 MG: 0.5 TABLET ORAL at 10:55

## 2023-01-24 RX ADMIN — METHOCARBAMOL 500 MG: 500 TABLET ORAL at 01:07

## 2023-01-24 RX ADMIN — APIXABAN 5 MG: 5 TABLET, FILM COATED ORAL at 21:42

## 2023-01-24 RX ADMIN — PREGABALIN 50 MG: 50 CAPSULE ORAL at 15:22

## 2023-01-24 RX ADMIN — LEVOTHYROXINE SODIUM 225 MCG: 150 TABLET ORAL at 05:14

## 2023-01-24 RX ADMIN — Medication 10 ML: at 08:18

## 2023-01-24 RX ADMIN — METHOCARBAMOL 500 MG: 500 TABLET ORAL at 18:30

## 2023-01-24 RX ADMIN — ACETAMINOPHEN 650 MG: 325 TABLET ORAL at 01:08

## 2023-01-24 RX ADMIN — TRAMADOL HYDROCHLORIDE 50 MG: 50 TABLET ORAL at 22:38

## 2023-01-24 RX ADMIN — NORTRIPTYLINE HYDROCHLORIDE 100 MG: 25 CAPSULE ORAL at 21:42

## 2023-01-24 RX ADMIN — SOTALOL HYDROCHLORIDE 80 MG: 80 TABLET ORAL at 21:42

## 2023-01-24 RX ADMIN — PREGABALIN 50 MG: 50 CAPSULE ORAL at 21:42

## 2023-01-24 RX ADMIN — ACETAMINOPHEN 650 MG: 325 TABLET ORAL at 18:30

## 2023-01-24 ASSESSMENT — PAIN DESCRIPTION - LOCATION
LOCATION: HIP
LOCATION: KNEE;LEG
LOCATION: HIP
LOCATION: HIP

## 2023-01-24 ASSESSMENT — PAIN SCALES - GENERAL
PAINLEVEL_OUTOF10: 5
PAINLEVEL_OUTOF10: 8
PAINLEVEL_OUTOF10: 6
PAINLEVEL_OUTOF10: 5
PAINLEVEL_OUTOF10: 7

## 2023-01-24 ASSESSMENT — PAIN DESCRIPTION - ORIENTATION
ORIENTATION: RIGHT

## 2023-01-24 ASSESSMENT — PAIN DESCRIPTION - DESCRIPTORS
DESCRIPTORS: DISCOMFORT
DESCRIPTORS: DISCOMFORT

## 2023-01-24 NOTE — ED NOTES
Pt report given to SHEYLA Ceron, states no questions or concerns at this time. Pt transported to floor via bed by ED tech.       [address], RN  01/23/23 2025

## 2023-01-24 NOTE — PROGRESS NOTES
[facility]    1/24/2023 9:23 AM        Name: Robbie Fisher . Admitted: 1/23/2023  Primary Care Provider: Lion Pabon MD (Tel: [phone])      Brief History: 70 yo female hx atrial fib (on Eliquis), nonobstructive CAD, HTN, HLD, DM2, hypothyroidism. She presented to ER with c/o unsteady gait and frequent falls. Subjective:  Presently up in bedside chair, working with PT/OT. Was noted to be orthostatic with sBP dropping to 79 with standing but she was asymptomatic. Denies chest pain, shortness of breath, palpitations, lightheadedness, dizziness.      Reviewed interval ancillary notes    Current Medications  perflutren lipid microspheres (DEFINITY) injection 1.5 mL, ONCE PRN  sodium chloride flush 0.9 % injection 5-40 mL, 2 times per day  sodium chloride flush 0.9 % injection 5-40 mL, PRN  0.9 % sodium chloride infusion, PRN  ondansetron (ZOFRAN-ODT) disintegrating tablet 4 mg, Q8H PRN   Or  ondansetron (ZOFRAN) injection 4 mg, Q6H PRN  polyethylene glycol (GLYCOLAX) packet 17 g, Daily PRN  acetaminophen (TYLENOL) tablet 650 mg, Q6H PRN   Or  acetaminophen (TYLENOL) suppository 650 mg, Q6H PRN  levothyroxine (SYNTHROID) tablet 225 mcg, Daily  methocarbamol (ROBAXIN) tablet 500 mg, 4x Daily PRN  amLODIPine (NORVASC) tablet 5 mg, Daily  sotalol (BETAPACE) tablet 80 mg, BID  nortriptyline (PAMELOR) capsule 100 mg, Nightly  pregabalin (LYRICA) capsule 50 mg, TID  apixaban (ELIQUIS) tablet 5 mg, BID  LORazepam (ATIVAN) tablet 0.5 mg, Q6H PRN        Objective:  BP (!) 153/89   Pulse 75   Temp 97.8 °F (36.6 °C)   Resp 20   Ht 5' 8\" (1.727 m)   Wt (!) 303 lb (137.4 kg)   SpO2 97%   BMI 46.07 kg/m²   No intake or output data in the 24 hours ending 01/24/23 0923   Wt Readings from Last 3 Encounters:   01/23/23 (!) 303 lb (137.4 kg)   09/12/22 (!) 314 lb 3.2 oz (142.5 kg)   08/22/22 (!) 314 lb 9.6 oz (142.7 kg)       General appearance:  Appears comfortable  Eyes: Sclera clear. Pupils equal.  ENT: Moist oral mucosa. Trachea midline, no adenopathy. Neck: + carotid bruit vs transmission of murmur  Cardiovascular: Regular rhythm, normal S1, S2. + BHUPINDER. No edema in lower extremities. Respiratory: Not using accessory muscles. Good inspiratory effort. Clear to auscultation bilaterally, no wheeze or crackles. GI: Abdomen soft, no tenderness, not distended, normal bowel sounds  Musculoskeletal: No cyanosis in digits, neck supple  Neurology: CN 2-12 grossly intact. No speech or motor deficits  Psych: Normal affect. Alert and oriented in time, place and person  Skin: Warm, dry, normal turgor    Labs and Tests:  CBC:   Recent Labs     01/23/23  1619   WBC 10.4   HGB 13.5        BMP:    Recent Labs     01/23/23  1619      K 4.4      CO2 27   BUN 18   CREATININE 0.8   GLUCOSE 115*     Hepatic:   Recent Labs     01/23/23  1619   AST 14*   ALT 12   BILITOT 0.3   ALKPHOS 60     CXR 1/23/2023:  No acute cardiopulmonary findings    CT Head 1/23/2023:  No acute intracranial abnormality. CT C-spine 1/23/2023:  No acute abnormality of the cervical spine. CT Lumbar Spine 1/23/2023:  Mild anterior wedging of the T12 vertebral body, in appearance suggestive of   an old fracture. No acute fracture identified. CT Abd / Pelvis 1/23/2023:  No acute process identified. Small hiatal hernia. Status post cholecystectomy and hysterectomy. Xray Right Shoulder 1/23/2023:  No acute fracture or dislocation    Xray Right Femur 1/23/2023:  No acute osseous or soft tissue abnormality. Degenerative change of the right hip and right knee joint spaces. Xray Right Knee 1/23/2023:  1. No acute fracture is seen. 2.  Suprapatellar joint effusion.   Tricompartmental osteoarthritis           Problem List  Principal Problem:    Frequent falls  Active Problems:    ELLIE (obstructive sleep apnea) PAF (paroxysmal atrial fibrillation) (HCC)    Hyperlipidemia    Hypothyroidism    HTN (hypertension)    Fibromyalgia    Obesity, morbid, BMI 40.0-49.9 ([facility] [address]  Resolved Problems:    * No resolved hospital problems. *       Assessment & Plan:   Unsteady gait / Recurrent falls. CT head with no acute abnormality. MRI brain pending. Evidence of orthostatic hypotension when working with PT/OT, supine /61, HR 67; standing BP 79/57. Check TSH, vitamin B12 & folate. Neuro consult pending. Aortic stenosis. Moderate AS on echo 6/2022. Limited echo to re-evaluate. Carotid stenosis. Moderate stenosis right carotid, mild stenosis left stenosis on CTA neck 8/2018. Carotid bruit noted on exam vs transmission of AS murmur. Check carotid dopplers. HTN / orthostatic hypotension. Takes amlodipine at e, continued on admission. Evidence of orthostatic hypotension while working with PT/OT today. Continue amlodipine, add knee high JORGE LUIS hose. PAF / hx NSVT. Continue Eliquis and sotalol. DM2. Diet controlled. A1c 6.1 (6/2022). Recheck A1c. Follow POCT glucose. Hypothyroidism. TSH 1.41 this admission. Continue levothyroxine 225 mcg daily. Disposition: Anticipate patient will likely need SNF on Dc, awaiting PT/OT recs. Anticipate likely ready for DC later tomorrow once testing complete and if orthostatic BP improved. Diet: ADULT DIET;  Regular  Code:Full Code  DVT PPX: AQUILES Novoa - CNP   1/24/2023 9:23 AM

## 2023-01-24 NOTE — PROGRESS NOTES
[facility] [address]   Phone: [phone]    Occupational Therapy    [x] Initial Evaluation            [] Daily Treatment Note         [] Discharge Summary      Patient: Robbie Fisher   : 1955   MRN: [id number]   Date of Service:  2023    Admitting Diagnosis:  Frequent falls  Current Admission Summary:   69 y.o. female  who presents to the ED complaining of a fall today. She complains primarily of right knee pain and has a history of surgery there. She also says she has injured her right side of her abdomen and flank and the right side of her low back. She says she has no injuries at all on the left side of her body. Although she did not hit her head and denies any acute neck pain she is anticoagulated on Eliquis. She also has a history of fibromyalgia. She typically uses a cane or walker when outside of her home but when in her home she does not require that type of assistance and was walking independently when the fall occurred today. She did not have any loss of consciousness or dizziness. She says she fell about a month ago as well. She also has some R shoulder pain  Past Medical History:  has a past medical history of Aortic stenosis, Arthritis, Coronary artery disease, Depression, Fatigue, Fibromyalgia, Hyperlipidemia, Hypertension, Hypothyroidism, Pancreatitis, and Type II or unspecified type diabetes mellitus without mention of complication, not stated as uncontrolled. Past Surgical History:  has a past surgical history that includes  section; Hysterectomy; Ankle fracture surgery (); lymph node biopsy (); Colonoscopy; Endoscopy, colon, diagnostic; and Cholecystectomy, laparoscopic (2014). Discharge Recommendations: Robbie Fisher scored a 17/24 on the AM-PAC ADL Inpatient form. Current research shows that an AM-PAC score of 17 or less is typically not associated with a discharge to the patient's home setting. Based on the patient's AM-PAC score and their current ADL deficits, it is recommended that the patient have 3-5 sessions per week of Occupational Therapy at d/c to increase the patient's independence. Please see assessment section for further patient specific details. If patient discharges prior to next session this note will serve as a discharge summary. Please see below for the latest assessment towards goals. DME Required For Discharge: DME to be determined at next level of care    Precautions/Restrictions: high fall risk  Weight Bearing Restrictions: no restrictions  [] Right Upper Extremity  [] Left Upper Extremity [] Right Lower Extremity  [] Left Lower Extremity     Required Braces/Orthotics: no braces required   [] Right  [] Left  Positional Restrictions:no positional restrictions    Pre-Admission Information   Lives With: alone    Type of Home: apartment- senior housing apartment   Home Layout: one level  Home Access: level entry  Bathroom Layout: walker accessible, walk in shower  Bathroom Equipment: grab bars in shower, grab bars around toilet, shower chair, hand held shower head  Toilet Height: elevated height  Home Equipment: rollator - 4 wheeled walker  Transfer Assistance: Independent without use of device  Ambulation Assistance:Independent without use of device, modified independent with use of rollator  -rollator when out of apartment, no device in apartment   ADL Assistance: independent with all ADL's  IADL Assistance:  has aide through [facility] that completes cleaning and laundry, gets groceries delivered, manages own medications   Active :        [x] Yes  [] No  Hand Dominance: [x] Left  [] Right  Current Employment: retired. Occupation: pharmacy at [employer]: spend time with friends in building. Run betzy sharma, read   Recent Falls: 3 falls within last 6 months-including fall that led to this admission.      Examination   Vision:   Vision Gross Assessment: Impaired and Vision Corrective Device: wears glasses at all times  Hearing:   [facility]  Sensation:   reports numbness and tingling in feet at night   ROM:   (L) Shoulder: ~120     (R) Shoulder: ~90  (B) Elbow AROM WFL  (B) Wrist AROM WFL  Strength:   (B) UE strength [facility]    Therapist Clinical Decision Making (Complexity): low complexity  Clinical Presentation: stable      Subjective  General: Patient in bed upon arrival, agreeable to OT/PT evaluation. Patient frustrated/upset about her functional status/pain. Agreeable to further therapy at facility. Pain: 5/10. Location: R leg   Increase to 8/10 with mobility   Pain Interventions: RN notified - nurse in during session to administer medications         Activities of Daily Living  Basic Activities of Daily Living  Grooming: setup assistance  Grooming Comments: patient applied deodorant seated EOB  Upper Extremity Bathing: stand by assistance  Bathing Comments: patient able to wash face and underarms seated EOB. Anticipate patient would have difficulty washing svitlana areas and LB   Upper Extremity Dressing: stand by assistance  Lower Extremity Dressing: contact guard assistance  Dressing Comments: SBA for gown change. CGA for doffing underwear and pants. CGA for threading underwear and managing over hips in stance with RW. Declining changing socks- anticipate maxA for socks or shoes due to pain  Toileting: contact guard assistance. Toileting Comments: patient voided urine seated on standard toilet- CGA for clothing mgmt and pericare. Anticipate patient may need assistance with rear svitlana hygiene   General Comments: patient able to complete hand hygiene standing at sink with RW and CGA. Instrumental Activities of Daily Living  No IADL completed on this date.     Functional Mobility  Bed Mobility  Supine to Sit: contact guard assistance  Scooting: contact guard assistance  Comments: increased time, with bed rail and HOB slightly elevated   Transfers  Sit to stand transfer:contact guard assistance, minimal assistance  Stand to sit transfer: contact guard assistance, verbal cueing   Stand step transfer: contact guard assistance, with RW  Toilet transfer: contact guard assistance  Toilet transfer equipment: standard toilet, grab bars, walker  Toilet transfer comments: patient declining elevated toilet seat with rails  Comments: CGA for transfer from EOB. Juan Carlos for transfer from recliner. Functional Mobility:  Sitting Balance: stand by assistance. Sitting Balance Comment: seated EOB  Standing Balance: contact guard assistance. Standing Balance Comment: with RW  Functional Mobility: .  contact guard assistance  Functional Mobility Activity: to/from bathroom, (8 ft + 8 ft)   Functional Mobility Device Use: rolling walker  Functional Mobility Comment: guarded due to pain    Other Therapeutic Interventions    Functional Outcomes  AM-PAC Inpatient Daily Activity Raw Score: 17    Cognition  WFL  Orientation:    alert and oriented x 4  Command Following:   [facility]     Education  Barriers To Learning: none  Patient Education: patient educated on goals, OT role and benefits, plan of care, proper use of assistive device/equipment, transfer training, discharge recommendations  Learning Assessment:  patient verbalizes and demonstrates understanding    Assessment  Activity Tolerance: Patient limited by pain. Patient on RA for session-No SOB noted.  No reports of dizziness or lightheadedness   Orthostatics taken during session, taken on pt's (L) forearm, as follows:                          Supine at rest /61, HR 67 bpm, SpO2 94%                          Sitting EOB /88                          Standing BP 79/57 (MAP 64) - in extra long cuff                          Seated in recliner x1 minute BP 93/59 (MAP 79)                          Seated x3 minutes (normal cuff) /80                          Standing /59                          After ambulation to/from bathroom /63  Impairments Requiring Therapeutic Intervention: decreased functional mobility, decreased ADL status, decreased strength, decreased endurance, decreased balance, decreased IADL, increased pain  Prognosis: good  Clinical Assessment: Patient presenting below baseline function secondary to fall. Patient typically independent with ADLs and mobility with no device in apartment and rollator for community distances. Patient requiring CGA-Juan Carlos for transfers and mobility with RW. Patient primarily limited by pain and decreased activity tolerance. Patient is not safe to return home alone at this time. Patient will benefit from continued OT services to address above deficits and maximize safety and independence prior to returning home. Safety Interventions: patient left in chair, chair alarm in place, call light within reach, patient at risk for falls, and nurse notified    Plan  Frequency: 3-5 x/per week  Current Treatment Recommendations: strengthening, balance training, functional mobility training, transfer training, endurance training, patient/caregiver education, ADL/self-care training, pain management, and safety education    Goals  Patient Goals: return to PLOF   Short Term Goals:  Time Frame: Discharge   Patient will complete lower body ADL at modified independent   Patient will complete toileting at modified independent   Patient will complete functional transfers at modified independent   Patient will increase [facility] ADL score = to or > than 19/24  Patient to maintain standing at modified independent for 5 minutes.     Therapy Session Time     Individual Group Co-treatment   Time In    0697   Time Out    1008   Minutes    65        Timed Code Treatment Minutes:  Timed Code Treatment Minutes: 50 Minutes  Total Treatment Minutes:  65 minutes        Electronically Signed By: Faheem Cueva [facility] OTR/L [id number]

## 2023-01-24 NOTE — H&P
Hospital Medicine History & Physical      PCP: Yari Santos MD    Date of Admission: 2023    Date of Service: Pt seen/examined on 23 and Admitted to Inpatient with expected LOS greater than two midnights due to medical therapy.     Chief Complaint: Frequent falls      History Of Present Illness:      Claire Dong is 67 y.o. female with history of HTN, Afib on eliquis, HLP, fibromyalgia and morbid obesity  She now presents to the ER with complaint of unsteady gait and frequent falls  She also has right knee pain which worsened after she had a fall today  She had hydrocortisone injection to bilateral knees about 5 to 6-month ago  She normally ambulates with a walker outside of the house but does not need device assistance while she walks in the house  She denies any dizziness or loss of consciousness  She last fell 1 month ago    Past Medical History:          Diagnosis Date    Aortic stenosis     Arthritis     Coronary artery disease     Depression     Fatigue     Fibromyalgia     Hyperlipidemia     Hypertension     Hypothyroidism     Pancreatitis     Type II or unspecified type diabetes mellitus without mention of complication, not stated as uncontrolled        Past Surgical History:          Procedure Laterality Date    ANKLE FRACTURE SURGERY      right     SECTION      CHOLECYSTECTOMY, LAPAROSCOPIC  2014    LAPAROSCOPIC CHOLECYSTECTOMY WITH INTRAOPERATIVE    COLONOSCOPY      ENDOSCOPY, COLON, DIAGNOSTIC      HYSTERECTOMY (CERVIX STATUS UNKNOWN)      LYMPH NODE BIOPSY         Medications Prior to Admission:      Prior to Admission medications    Medication Sig Start Date End Date Taking? Authorizing Provider   amLODIPine (NORVASC) 5 MG tablet Take 1 tablet by mouth daily 22   Matias Davison MD   nitroGLYCERIN (NITROSTAT) 0.4 MG SL tablet Place 1 tablet under the tongue every 5 minutes as needed for Chest pain 22   Matias Davison MD   ELIQUIS 5 MG TABS  tablet Take 1 tablet by mouth 2 times daily 6/23/22   Lara Kennedy MD   sotalol (BETAPACE) 80 MG tablet Take 1 tablet by mouth 2 times daily 6/6/22   AQUILES Ku CNP   pregabalin (LYRICA) 50 MG capsule Take 1 capsule by mouth 3 times daily. Once during the day and two at night per pt 6/6/22   AQUILES Ku CNP   lidocaine 4 % external patch Place 1 patch onto the skin daily  Patient not taking: Reported on 1/23/2023 6/7/22   AQUILES Ku CNP   famotidine (PEPCID) 20 MG tablet Take 1 tablet by mouth 2 times daily as needed 6/6/22   AQUILES Ku CNP   levothyroxine (SYNTHROID) 25 MCG tablet TAKE ONE TABLET BY MOUTH DAILY ALONG WITH 200 MCG TABLET (225 MCG TOTAL) 6/6/22   AQUILES Ku CNP   escitalopram (LEXAPRO) 20 MG tablet Take 1 tablet by mouth nightly Unsure of mg amount  Patient not taking: Reported on 1/23/2023 6/6/22   AQUILES Ku CNP   nortriptyline (PAMELOR) 75 MG capsule 100 mg nightly 2 50 mg caps at bed time per pt 4/4/22   Historical Provider, MD   LORazepam (ATIVAN) 0.5 MG tablet Take 0.5 mg by mouth every 6 hours as needed for Anxiety. Historical Provider, MD   HYDROcodone-acetaminophen (NORCO) 5-325 MG per tablet Take 1 tablet by mouth every 6 hours as needed for Pain. Patient not taking: Reported on 1/23/2023    Historical Provider, MD   Misc. Devices [facility]) MISC Use with walking or standing at all times to prevent falls.  12/31/20   MAREN Cotton   methocarbamol (ROBAXIN) 500 MG tablet TAKE 1 TABLET BY MOUTH 4 TIMES DAILY AS NEEDED (PAIN) 4/26/18   Lara Mauricio MD   omeprazole (PRILOSEC) 40 MG delayed release capsule TAKE ONE CAPSULE BY MOUTH ONE TIME DAILY  Patient not taking: Reported on 1/23/2023 4/16/18   Lara Mauricio MD   fluticasone Clearance Bounds) 50 MCG/ACT nasal spray 2 sprays by Nasal route daily 11/24/17   Lara Mauricio MD   vitamin D (CHOLECALCIFEROL) 5000 units CAPS capsule Take 5,000 Units by mouth daily    Historical Provider, MD   Coenzyme Q10 (COQ-10 PO) Take by mouth daily    Historical Provider, MD   Calcium-Magnesium-Vitamin D 185- MG-MG-UNIT CAPS Take 1 capsule by mouth daily    Historical Provider, MD   acetaminophen (TYLENOL) 325 MG tablet Take 650 mg by mouth every 6 hours as needed for Pain    Historical Provider, MD   therapeutic multivitamin-minerals (THERAGRAN-M) tablet Take 1 tablet by mouth daily. Historical Provider, MD   Omega-3 Fatty Acids (FISH OIL) 1000 MG CAPS Take 1,000 mg by mouth daily. Historical Provider, MD       Allergies:  Cephalosporins, Dilaudid [hydromorphone hcl], Fortaz [ceftazidime], and Statins    Social History:      The patient currently lives at home    TOBACCO:   reports that she quit smoking about 13 years ago. Her smoking use included cigarettes. She has never used smokeless tobacco.  ETOH:   reports no history of alcohol use. E-cigarette/Vaping       Questions Responses    E-cigarette/Vaping Use Current Some Day User    Start Date     Passive Exposure     Quit Date     Counseling Given     Comments               Family History:      Reviewed and negative in regards to presenting illness/complaint. Problem Relation Age of Onset    Diabetes Mother     Hypertension Mother     Stroke Father     Heart Disease Brother         aortic stenosis       REVIEW OF SYSTEMS COMPLETED:   Pertinent positives as noted in the HPI. All other systems reviewed and negative. PHYSICAL EXAM PERFORMED:    /80   Pulse 71   Temp 97.5 °F (36.4 °C) (Oral)   Resp 15   Ht 5' 8\" (1.727 m)   Wt (!) 303 lb (137.4 kg)   SpO2 95%   BMI 46.07 kg/m²     General appearance:  No apparent distress, appears stated age and cooperative. HEENT:  Normal cephalic, atraumatic without obvious deformity. Pupils equal, round, and reactive to light. Extra ocular muscles intact. Conjunctivae/corneas clear. Neck: Supple, with full range of motion.  No jugular venous distention. Trachea midline. Respiratory:  Normal respiratory effort. Clear to auscultation, bilaterally without Rales/Wheezes/Rhonchi. Cardiovascular:  Regular rate and rhythm with normal S1/S2 without murmurs, rubs or gallops. Abdomen: Soft, non-tender, non-distended with normal bowel sounds. Musculoskeletal:  No clubbing, cyanosis or edema bilaterally. Full range of motion without deformity. Skin: Skin color, texture, turgor normal.  No rashes or lesions. Neurologic:  Neurovascularly intact without any focal sensory/motor deficits. Cranial nerves: II-XII intact, grossly non-focal.  Psychiatric:  Alert and oriented, thought content appropriate, normal insight  Capillary Refill: Brisk,3 seconds, normal  Peripheral Pulses: +2 palpable, equal bilaterally       Labs:     Recent Labs     01/23/23  1619   WBC 10.4   HGB 13.5   HCT 41.9        Recent Labs     01/23/23  1619      K 4.4      CO2 27   BUN 18   CREATININE 0.8   CALCIUM 8.9     Recent Labs     01/23/23  1619   AST 14*   ALT 12   BILITOT 0.3   ALKPHOS 60     Recent Labs     01/23/23  1619   INR 1.14     Recent Labs     01/23/23  1619   TROPONINI <0.01       Urinalysis:      Lab Results   Component Value Date/Time    NITRU Negative 12/31/2020 04:56 PM    WBCUA 0-2 10/08/2016 05:16 PM    BACTERIA 2+ 10/08/2016 05:16 PM    RBCUA None seen 10/08/2016 05:16 PM    BLOODU Negative 12/31/2020 04:56 PM    SPECGRAV >=1.030 12/31/2020 04:56 PM    GLUCOSEU Negative 12/31/2020 04:56 PM       Radiology:     CXR: I have reviewed the CXR with the following interpretation:   EKG:  I have reviewed the EKG with the following interpretation:     CT ABDOMEN PELVIS W IV CONTRAST Additional Contrast? None   Final Result   No acute process identified. Small hiatal hernia. Status post cholecystectomy and hysterectomy.          CT LUMBAR SPINE TRAUMA RECONSTRUCTION   Final Result   Mild anterior wedging of the T12 vertebral body, in appearance suggestive of   an old fracture. No acute fracture identified. CT CERVICAL SPINE WO CONTRAST   Final Result   No acute abnormality of the cervical spine. CT HEAD WO CONTRAST   Final Result   No acute intracranial abnormality. XR SHOULDER RIGHT (MIN 2 VIEWS)   Final Result   No acute fracture or dislocation         XR FEMUR RIGHT (MIN 2 VIEWS)   Final Result   No acute osseous or soft tissue abnormality. Degenerative change of the right hip and right knee joint spaces. XR KNEE RIGHT (1-2 VIEWS)   Final Result   1. No acute fracture is seen. 2.  Suprapatellar joint effusion. Tricompartmental osteoarthritis         XR CHEST PORTABLE   Final Result   No acute cardiopulmonary findings             Consults:    IP CONSULT TO HOSPITALIST    ASSESSMENT:    Frequent falls  Unable to walk steadily with multiple contusions but no fracture  She also has multiple musculoskeletal complaints including knee pain, right shoulder, back  Osteoarthritis of multiple joints  Paroxysmal atrial fibrillation  Chronically anticoagulated with Eliquis  Hypertension  Hypothyroid  Fibromyalgia  Obstructive sleep apnea  Morbid obesity, BMI 46    PLAN:    Admit to [facility]  Consult PT/OT  She would likely need placement to SNF  Check TSH. Continue levothyroxine  Okay to continue Eliquis for now and sotalol -for A. fib  Continue amlodipine for hypertension and Lyrica for fibromyalgia    DVT Prophylaxis: Already on Eliquis  Diet: ADULT DIET; Regular  Code Status: Full Code    PT/OT Eval Status: PT/OT consult is ordered    Dispo -admit as inpatient       Judi Elise MD    Thank you Maegan Briones MD for the opportunity to be involved in this patient's care. If you have any questions or concerns please feel free to contact me at [phone].

## 2023-01-24 NOTE — CARE COORDINATION
Discharge Planning Assessment  Risk of Readmission Score: 14%    RN discharge planner met with patient to discuss reason for admission, current living situation, and potential needs at the time of discharge. Demographics/Insurance verified Yes    Current type of dwelling: apartment at [facility] [address]    Patient from ECF/ confirmed with: N/A    Living arrangements: Alone    Level of function/Support: independent changing to using a walker to ambulate in the home. PCP: Keyona Camejo MD    Last Visit to PCP: 11/21/2022    DME: walker, walk-in shower, shower seat, grab bar    Active with any community resources/agencies/skilled home care:    - Patient uses [facility] for an aide that comes to the house. Medication compliance issues: The patient states she is compliant with her medications.     Financial issues that could impact healthcare: none    Tentative discharge plan: PT/OT pending, discharge plan TBD      Transportation at the time of discharge: KULWINDER Woods RN    [facility]  Phone: [phone]

## 2023-01-24 NOTE — CARE COORDINATION
Discharge Planning Note:     Met with the patient. An approved SNF list was reviewed and the following referral was placed a the request of the patient. - [id number] [facility]    Will continue to follow.     BRUCE GarrettN RN    [facility]  Phone: [phone]

## 2023-01-24 NOTE — PROGRESS NOTES
[facility] Department   Phone: [phone]    Physical Therapy    [x] Initial Evaluation            [] Daily Treatment Note         [] Discharge Summary      Patient: Lisha Jenkins   : 1955   MRN: [id number]   Date of Service:  2023  Admitting Diagnosis: Frequent falls  Current Admission Summary: Per H&P on  \"67 y.o. female with history of HTN, Afib on eliquis, HLP, fibromyalgia and morbid obesity  She now presents to the ER with complaint of unsteady gait and frequent falls  She also has right knee pain which worsened after she had a fall today  She had hydrocortisone injection to bilateral knees about 5 to 6-month ago  She normally ambulates with a walker outside of the house but does not need device assistance while she walks in the house  She denies any dizziness or loss of consciousness  She last fell 1 month ago\"    Past Medical History:  has a past medical history of Aortic stenosis, Arthritis, Coronary artery disease, Depression, Fatigue, Fibromyalgia, Hyperlipidemia, Hypertension, Hypothyroidism, Pancreatitis, and Type II or unspecified type diabetes mellitus without mention of complication, not stated as uncontrolled. Past Surgical History:  has a past surgical history that includes  section; Hysterectomy; Ankle fracture surgery (); lymph node biopsy (); Colonoscopy; Endoscopy, colon, diagnostic; and Cholecystectomy, laparoscopic (2014). Discharge Recommendations: Lisha Jenkins scored a 13/24 on the AM-PAC short mobility form. Current research shows that an AM-PAC score of 17 or less is typically not associated with a discharge to the patient's home setting. Based on the patient's AM-PAC score and their current functional mobility deficits, it is recommended that the patient have 3-5 sessions per week of Physical Therapy at d/c to increase the patient's independence.   Please see assessment section for further patient specific details. If patient discharges prior to next session this note will serve as a discharge summary. Please see below for the latest assessment towards goals. DME Required For Discharge: DME to be determined at next level of care    Precautions/Restrictions: high fall risk  Weight Bearing Restrictions: no restrictions  [] Right Upper Extremity  [] Left Upper Extremity [] Right Lower Extremity  [] Left Lower Extremity     Required Braces/Orthotics: no braces required   [] Right  [] Left  Positional Restrictions:no positional restrictions    Pre-Admission Information   Lives With: alone                     Type of Home: apartment- senior housing apartment   Home Layout: one level  Home Access: level entry  Bathroom Layout: walker accessible, walk in shower  Bathroom Equipment: grab bars in shower, grab bars around toilet, shower chair, hand held shower head  Toilet Height: elevated height  Home Equipment: rollator - 4 wheeled walker  Transfer Assistance: Independent without use of device  Ambulation Assistance:Independent without use of device, modified independent with use of rollator  -rollator when out of apartment, no device in apartment   ADL Assistance: independent with all ADL's  IADL Assistance:  has aide through [employer] that completes cleaning and laundry, gets groceries delivered, manages own medications   Active :        [x] Yes                 [] No  Hand Dominance: [x] Left                 [] Right  Current Employment: retired. Occupation: pharmacy at [employer]: spend time with friends in [place], read   Recent Falls: 3 falls within last 6 months-including fall that led to this admission. Examination   Vision:   Vision Gross Assessment: Impaired and Vision Corrective Device: wears glasses at all times  Hearing:   [place]  Observation:   General Observation:  Pt on RA throughout session.  Pt reporting significant knee pain with (R) LE movement but no bruising or swelling noted. Posture:   Good  Sensation:   WFL  ROM:   (B) Hip AROM WFL  (L) Knee: WFL     (R) Knee: approx -45 to 90 deg flexion, limited by pain  (L) Ankle: WFL     (R) Ankle: PF WFL, DF ~5 deg due to hx of ankle fx and repair  Strength:   (L) LE 4/5 hip flexion, 4+/5 knee flex/ext, 5/5 ankle DF  (R) LE testing deferred due to knee pain  Therapist Clinical Decision Making (Complexity): low complexity  Clinical Presentation: stable      Subjective  General: Patient semi-reclined in bed upon therapist arrival. Agreeable to PT/OT eval. Pt frustrated that she is having more (R) knee pain as her recent cortisone shots in (B) knees were really helping her. Pain: 5/10. Location: (R) LE  Pain Interventions: RN notified of patient request for pain medication and repositioned         Functional Mobility  Bed Mobility  Supine to Sit: contact guard assistance  Rolling Right: contact guard assistance  Scooting: stand by assistance  Comments: Increased time for bed mobility with HOB slightly elevated (pt unable to tolerate bed flat due to pain this date) and use of rail. Transfers  Sit to stand transfer: contact guard assistance, minimal assistance  Stand to sit transfer: contact guard assistance  Stand step transfer: contact guard assistance, with RW (bed>chair)  Toilet transfer: contact guard assistance, with (B) grab bar  Comments: Sit>stand CGA from EOB with cues for hand placement. Sit>stand from recliner with Min A, good carry over for hand placement. Cues to align fully to chair for stand>sit as pt fearful of (R) LE buckling.   Ambulation  Surface:level surface  Assistive Device: rolling walker  Assistance: contact guard assistance  Distance: 8 ft + 8 ft  Gait Mechanics: Decreased (R) stance time, decreased step length (B) with (L) more impaired than (R), decreased step height (B), slow maya, antalgic pattern, forward flexed posture  Comments:  Pt with mildly improved ease of movement on 2nd ambulation trial but still slow, effortful, and painful with all walking. Stair Mobility  Stair mobility not completed on this date. Comments:  Wheelchair Mobility:  No w/c mobility completed on this date. Comments:  Balance  Static Sitting Balance: good: independent with functional balance in unsupported position  Dynamic Sitting Balance: fair (+): maintains balance at SBA/supervision without use of UE support  Static Standing Balance: fair (-): maintains balance at CGA with use of UE support  Dynamic Standing Balance: fair (-): maintains balance at CGA with use of UE support  Comments: Pt sat on commode for lower body dressing and pericare with supervision. Pt stood 2 x1 minutes for BP readings with (L) lateral weight shift due to pain and moderate UE support. The patient was able to stand at sink x20 sec to wash hands without UE support with no increased sway or (R) LE buckling. Other Therapeutic Interventions  Pt assisted up to bathroom and with dressing, see OT note. LAQ (in pain free range) x10 reps (R) LE prior to standing/ambulation. Functional Outcomes  AM-PAC Inpatient Mobility Raw Score : 13              Cognition  WFL  Orientation:    alert and oriented x 4  Command Following:   [facility]    Education  Barriers To Learning: none  Patient Education: patient educated on goals, PT role and benefits, plan of care, general safety, functional mobility training, proper use of assistive device/equipment, transfer training, discharge recommendations  Learning Assessment:  patient verbalizes understanding, would benefit from continued reinforcement    Assessment  Activity Tolerance: Pt with increased pain with all weight bearing and movement of (R) LE.  The patient was noted to be sweating with activity but states she sweats easily at baseline and this is normal. Blood pressure, taken on pt's (L) forearm, as follows:   Supine at rest /61, HR 67 bpm, SpO2 94%   Sitting EOB /88   Standing BP 79/57 (MAP 64) - in extra long cuff   Seated in recliner x1 minute BP 93/59 (MAP 79)   Seated x3 minutes (normal cuff) /80   Standing /59   After ambulation to/from bathroom /63  Impairments Requiring Therapeutic Intervention: decreased functional mobility, decreased ADL status, decreased ROM, decreased strength, decreased balance, increased pain  Prognosis: good  Clinical Assessment: The patient is a 70 yo female admitted to [facility] s/p fall at home, seen now with increased (R) knee pain which is limiting her tolerance to activity and overall independence. At this time the patient requires CGA-Min A for transfers and is only able to tolerate walking short household distances. The patient lives alone in her apartment and is unsafe to return at this time as she is at a high risk of additional falls. Recommending continued skilled PT to safely progress tolerance to activity and independence with functional mobility back to baseline.    Safety Interventions: patient left in chair, chair alarm in place, call light within reach, gait belt, patient at risk for falls, and nurse notified    Plan  Frequency: 3-5 x/per week  Current Treatment Recommendations: strengthening, ROM, balance training, functional mobility training, transfer training, gait training, patient/caregiver education, pain management, home exercise program, safety education, equipment evaluation/education, and positioning    Goals  Patient Goals: Go home   Short Term Goals:  Time Frame: Before discharge  Patient will complete bed mobility at supervision, from flat bed   Patient will complete sit<>stand transfers at supervision   Patient will complete stand step transfers with RW at supervision  Patient will ambulate 50 ft with use of rolling walker at supervision  Patient to maintain standing at supervision for 5 minutes without UE support in order to complete ADLs    Therapy Session Time      Individual Group Co-treatment   Time In     [phone] Time Out     1008   Minutes     65     Timed Code Treatment Minutes:  50 Minutes  Total Treatment Minutes:  65 minutes       Electronically Signed By: hWitney Berry, PT      Ricardo García PT, DPT #[id number]

## 2023-01-24 NOTE — PROGRESS NOTES
Pt admitted for fall today at home to E. No wounds or bruising noted. Pt is able to ambulate to the bathroom with a walker without difficulty. Call light is within reach.    Electronically signed by Christopher Maldonado RN on 1/24/2023 at 4:23 AM

## 2023-01-24 NOTE — CONSULTS
In patient Neurology consult        [facility]      MD Soniya James  1955    Date of Service: 1/24/2023    Referring Physician: Emmie Waterman MD      Reason for the consult and CC: Recurrent falling    HPI:   The patient is a 69y.o.  years old female with history of hypertension, fibromyalgia and A. fib on Eliquis who was admitted to the hospital yesterday with recurrent falling this. Symptoms started several years ago but worse over the last 6 months. She fell few times at home. She lives by herself. No warning or feeling lightheaded or dizziness. She can fall either forward onto the right side. No significant injury or blacking out or head trauma. No other relieving or aggravating factors or clear triggers. She denies any lightheadedness upon standing, dysphagia dysarthria or frequent headaches. Patient felt unsafe and she came to the hospital where she was admitted. Initial imaging with CT head showed no acute findings. Today she denies any other new symptoms. Other review of system was unremarkable. Constitutional:   Vitals:    01/23/23 2045 01/24/23 0100 01/24/23 0500 01/24/23 0820   BP: 135/80 125/73 (!) 157/73 (!) 153/89   Pulse: 71 68 72 75   Resp: 15 20 20 20   Temp: 97.5 °F (36.4 °C) 98 °F (36.7 °C) 98.2 °F (36.8 °C) 97.8 °F (36.6 °C)   TempSrc: Oral Oral Oral Oral   SpO2: 95% 95% 96% 97%   Weight:       Height:             I personally reviewed and updated social history, past medical history, medications, allergy, surgical history, and family history as documented in the patient's electronic health records. ROS: 10-14 ROS reviewed with the patient/nurse/family which were unremarkable except mentioned in H&P. General appearance:  Normal development and appear in no acute distress. Mental Status:   Oriented to person, place, problem, and time. Memory: Good immediate recall.   Intact remote memory  Normal attention span and concentration. Language: intact naming, repeating and fluency   Good fund of Knowledge. Aware of current events and vocabulary   Cranial Nerves:   II: Visual fields: Full. Pupils: equal, round, reactive to light, bilaterally  III,IV,VI: Extra Ocular Movements are intact. No nystagmus  V: Facial sensation is intact  VII: Facial strength and movements: intact and symmetric  IX: Normal palatal elevation and shoulder shrug  XII: Tongue movements are normal  Musculoskeletal: 5/5 in all 4 extremities. Good range of motion. No muscle atrophy. Tone: Normal tone. Reflexes: Symmetric 2+ in the arms and 1+ in the legs   Planters: Flexor bilaterally  Coordination: no pronator drift, no dysmetria with FNF and normal REM  Sensation: normal in both arms and legs. Gait/Posture: Unsteady. Medical decision making:  Data: reviewed   LABS:   Lab Results   Component Value Date/Time     01/23/2023 04:19 PM    K 4.4 01/23/2023 04:19 PM     01/23/2023 04:19 PM    CO2 27 01/23/2023 04:19 PM    BUN 18 01/23/2023 04:19 PM    CREATININE 0.8 01/23/2023 04:19 PM    GFRAA >60 06/06/2022 05:24 AM    GFRAA >60 02/13/2013 12:07 PM    LABGLOM >60 01/23/2023 04:19 PM    GLUCOSE 115 01/23/2023 04:19 PM    PHOS 4.1 05/17/2014 07:27 AM    MG 2.10 06/03/2022 01:50 PM    CALCIUM 8.9 01/23/2023 04:19 PM     Lab Results   Component Value Date/Time    WBC 10.4 01/23/2023 04:19 PM    RBC 5.15 01/23/2023 04:19 PM    HGB 13.5 01/23/2023 04:19 PM    HCT 41.9 01/23/2023 04:19 PM    MCV 81.3 01/23/2023 04:19 PM    RDW 15.0 01/23/2023 04:19 PM     01/23/2023 04:19 PM     Lab Results   Component Value Date    INR 1.14 01/23/2023    PROTIME 14.6 (H) 01/23/2023       Neuroimaging was independently reviewed by myself and discussed results with the patient  Reviewed notes from different physicians including H&P and ED notes. Reviewed lab and blood testing    Impression:  Recurrent fall, not controlled.   Possible etiology could include sensory ataxia, polyneuropathy and less likely vascular or new stroke. MRI brain was ordered. A. fib on Eliquis  Hypertension, not controlled  Hyperlipidemia  Chronic arthritis    Recommendation:  MRI brain and Doppler were ordered  Continue Eliquis  PT OT  Check orthostatics  Hydration  Continue current blood pressure medications  Statin  May benefit from rehab for balance training giving risk of falling at home by herself on blood thinner  Stroke prevention discussed today  A1c, B12, TSH and LDL  Continue Lyrica for her fibromyalgia  Can be discharged from neurology once medically stable  No further recommendation  Will follow if MRI showed acute stroke      Thank you for referring such patient. If you have any questions regarding my consult note, please don't hesitate to call me. Zoila Burt MD  [phone]    This dictation was generated by voice recognition computer software.  Although all attempts are made to edit the dictation for accuracy, there may be errors in the  transcription that are not intended

## 2023-01-24 NOTE — CARE COORDINATION
Discharge Planning Note:    David Patten ID: [id number]    Dates: 01/24/2023 to 01/26/2023    Next Review Date: 01/26/2023    Status: APPROVED    Will continue to follow.     Augie Jj, BSN RN    [facility]  Phone: [phone]

## 2023-01-25 VITALS
OXYGEN SATURATION: 94 % | SYSTOLIC BLOOD PRESSURE: 142 MMHG | HEIGHT: 68 IN | BODY MASS INDEX: 44.41 KG/M2 | RESPIRATION RATE: 16 BRPM | HEART RATE: 80 BPM | DIASTOLIC BLOOD PRESSURE: 78 MMHG | WEIGHT: 293 LBS | TEMPERATURE: 97.6 F

## 2023-01-25 LAB
ANION GAP SERPL CALCULATED.3IONS-SCNC: 12 MMOL/L (ref 3–16)
BUN BLDV-MCNC: 17 MG/DL (ref 7–20)
CALCIUM SERPL-MCNC: 8.7 MG/DL (ref 8.3–10.6)
CHLORIDE BLD-SCNC: 103 MMOL/L (ref 99–110)
CHOLESTEROL, TOTAL: 177 MG/DL (ref 0–199)
CO2: 24 MMOL/L (ref 21–32)
CREAT SERPL-MCNC: 0.7 MG/DL (ref 0.6–1.2)
ESTIMATED AVERAGE GLUCOSE: 128.4 MG/DL
GFR SERPL CREATININE-BSD FRML MDRD: >60 ML/MIN/{1.73_M2}
GLUCOSE BLD-MCNC: 114 MG/DL (ref 70–99)
HBA1C MFR BLD: 6.1 %
HCT VFR BLD CALC: 38.3 % (ref 36–48)
HDLC SERPL-MCNC: 51 MG/DL (ref 40–60)
HEMOGLOBIN: 12.5 G/DL (ref 12–16)
LDL CHOLESTEROL CALCULATED: 103 MG/DL
LV EF: 60 %
LVEF MODALITY: NORMAL
MCH RBC QN AUTO: 26.2 PG (ref 26–34)
MCHC RBC AUTO-ENTMCNC: 32.6 G/DL (ref 31–36)
MCV RBC AUTO: 80.3 FL (ref 80–100)
PDW BLD-RTO: 15 % (ref 12.4–15.4)
PLATELET # BLD: 296 K/UL (ref 135–450)
PMV BLD AUTO: 7.5 FL (ref 5–10.5)
POTASSIUM SERPL-SCNC: 4.1 MMOL/L (ref 3.5–5.1)
RBC # BLD: 4.77 M/UL (ref 4–5.2)
SODIUM BLD-SCNC: 139 MMOL/L (ref 136–145)
TRIGL SERPL-MCNC: 114 MG/DL (ref 0–150)
VLDLC SERPL CALC-MCNC: 23 MG/DL
WBC # BLD: 7.2 K/UL (ref 4–11)

## 2023-01-25 PROCEDURE — 2580000003 HC RX 258: Performed by: INTERNAL MEDICINE

## 2023-01-25 PROCEDURE — 6370000000 HC RX 637 (ALT 250 FOR IP): Performed by: INTERNAL MEDICINE

## 2023-01-25 PROCEDURE — 85027 COMPLETE CBC AUTOMATED: CPT

## 2023-01-25 PROCEDURE — 99232 SBSQ HOSP IP/OBS MODERATE 35: CPT | Performed by: PSYCHIATRY & NEUROLOGY

## 2023-01-25 PROCEDURE — 80061 LIPID PANEL: CPT

## 2023-01-25 PROCEDURE — 80048 BASIC METABOLIC PNL TOTAL CA: CPT

## 2023-01-25 PROCEDURE — 93308 TTE F-UP OR LMTD: CPT

## 2023-01-25 PROCEDURE — 93321 DOPPLER ECHO F-UP/LMTD STD: CPT

## 2023-01-25 PROCEDURE — 6370000000 HC RX 637 (ALT 250 FOR IP): Performed by: NURSE PRACTITIONER

## 2023-01-25 PROCEDURE — 36415 COLL VENOUS BLD VENIPUNCTURE: CPT

## 2023-01-25 PROCEDURE — 93325 DOPPLER ECHO COLOR FLOW MAPG: CPT

## 2023-01-25 RX ORDER — NORTRIPTYLINE HYDROCHLORIDE 50 MG/1
100 CAPSULE ORAL NIGHTLY
COMMUNITY
Start: 2023-01-25

## 2023-01-25 RX ORDER — LORAZEPAM 0.5 MG/1
0.5 TABLET ORAL EVERY 6 HOURS PRN
Qty: 12 TABLET | Refills: 0 | Status: SHIPPED | OUTPATIENT
Start: 2023-01-25 | End: 2023-01-28

## 2023-01-25 RX ORDER — POLYETHYLENE GLYCOL 3350 17 G/17G
17 POWDER, FOR SOLUTION ORAL DAILY PRN
Qty: 30 EACH | Refills: 0
Start: 2023-01-25 | End: 2023-02-24

## 2023-01-25 RX ORDER — TRAMADOL HYDROCHLORIDE 50 MG/1
50 TABLET ORAL EVERY 6 HOURS PRN
Qty: 12 TABLET | Refills: 0 | Status: SHIPPED | OUTPATIENT
Start: 2023-01-25 | End: 2023-01-28

## 2023-01-25 RX ORDER — LEVOTHYROXINE SODIUM 0.07 MG/1
225 TABLET ORAL DAILY
Qty: 30 TABLET | Refills: 3 | COMMUNITY
Start: 2023-01-26

## 2023-01-25 RX ADMIN — ONDANSETRON 4 MG: 4 TABLET, ORALLY DISINTEGRATING ORAL at 14:25

## 2023-01-25 RX ADMIN — PREGABALIN 50 MG: 50 CAPSULE ORAL at 14:22

## 2023-01-25 RX ADMIN — LEVOTHYROXINE SODIUM 225 MCG: 150 TABLET ORAL at 06:51

## 2023-01-25 RX ADMIN — TRAMADOL HYDROCHLORIDE 50 MG: 50 TABLET ORAL at 19:09

## 2023-01-25 RX ADMIN — APIXABAN 5 MG: 5 TABLET, FILM COATED ORAL at 08:37

## 2023-01-25 RX ADMIN — Medication 10 ML: at 08:38

## 2023-01-25 RX ADMIN — METHOCARBAMOL 500 MG: 500 TABLET ORAL at 08:37

## 2023-01-25 RX ADMIN — SOTALOL HYDROCHLORIDE 80 MG: 80 TABLET ORAL at 08:37

## 2023-01-25 RX ADMIN — TRAMADOL HYDROCHLORIDE 50 MG: 50 TABLET ORAL at 08:37

## 2023-01-25 RX ADMIN — PREGABALIN 50 MG: 50 CAPSULE ORAL at 08:37

## 2023-01-25 RX ADMIN — AMLODIPINE BESYLATE 5 MG: 5 TABLET ORAL at 08:37

## 2023-01-25 ASSESSMENT — PAIN SCALES - GENERAL
PAINLEVEL_OUTOF10: 0
PAINLEVEL_OUTOF10: 7
PAINLEVEL_OUTOF10: 8

## 2023-01-25 ASSESSMENT — PAIN DESCRIPTION - LOCATION
LOCATION: LEG;BACK
LOCATION: LEG
LOCATION: LEG

## 2023-01-25 NOTE — DISCHARGE INSTR - COC
Continuity of Care Form    Patient Name: Candace Carmen   :  1955  MRN:  [id number]    Admit date:  2023  Discharge date:  2023    Code Status Order: Full Code   Advance Directives:     Admitting Physician:  Hamilton Donnelly MD  PCP: Aarti Sheppard MD    Discharging Nurse: [facility] Unit/Room#: 0VX-9008/1731-64  Discharging Unit Phone Number: [phone]    Emergency Contact:   Extended Emergency Contact Information  Primary Emergency Contact: [address] Phone: [phone]  Relation: Child  Secondary Emergency Contact: Sol Schlatter  Address: [address] Phone: [phone]  Relation: Child    Past Surgical History:  Past Surgical History:   Procedure Laterality Date    ANKLE FRACTURE SURGERY      right     SECTION      CHOLECYSTECTOMY, LAPAROSCOPIC  2014    LAPAROSCOPIC CHOLECYSTECTOMY WITH INTRAOPERATIVE    COLONOSCOPY      ENDOSCOPY, COLON, DIAGNOSTIC      HYSTERECTOMY (CERVIX STATUS UNKNOWN)      LYMPH NODE BIOPSY         Immunization History:   Immunization History   Administered Date(s) Administered    COVID-19, MODERNA BLUE border, Primary or Immunocompromised, (age 12y+), IM, 100 mcg/0.5mL 2021, 2021    COVID-19, MODERNA Bivalent BOOSTER, (age 12y+), IM, 50 mcg/0.5 mL 10/06/2022    INFLUENZA, INTRADERMAL, QUADRIVALENT, PRESERVATIVE FREE 2016, 2017    Influenza Vaccine, unspecified formulation 10/15/2008, 2009, 2009    Influenza Virus Vaccine 2009, 10/21/2015    Pneumococcal Polysaccharide (Guysamctf94) 2009       Active Problems:  Patient Active Problem List   Diagnosis Code    Recurrent major depressive disorder, in full remission ([facility] [address] F33.42    Fatigue R53.83    Hyperglycemia R73.9    Hyperlipidemia E78.5    Aortic stenosis I35.0    Ankle edema M25.473 Hypothyroidism E03.9    HTN (hypertension) I10    Fibromyalgia M79.7    Myofascial pain syndrome M79.18    DDD (degenerative disc disease), lumbosacral M51.37    Acute renal failure (HCC) N17.9    Bradycardia, sinus R00.1    Pancreatitis K85.90    Biliary dyskinesia K82.8    Syncope, near R55    Injury of left wrist S69. 92XA    Obesity, morbid, BMI 40.0-49.9 ([facility]) E66.01    Morbid obesity due to excess calories ([facility]) E66.01    Chest pain R07.9    ELLIE (obstructive sleep apnea) G47.33    NSVT (nonsustained ventricular tachycardia) I47.29    PAF (paroxysmal atrial fibrillation) ([facility]) I48.0    Frequent falls R29.6       Isolation/Infection:   Isolation            No Isolation          Patient Infection Status       None to display            Nurse Assessment:  Last Vital Signs: /62   Pulse 74   Temp 97.6 °F (36.4 °C) (Oral)   Resp 17   Ht 5' 8\" (1.727 m)   Wt (!) 303 lb (137.4 kg)   SpO2 94%   BMI 46.07 kg/m²     Last documented pain score (0-10 scale): Pain Level: 7  Last Weight:   Wt Readings from Last 1 Encounters:   01/23/23 (!) 303 lb (137.4 kg)     Mental Status:  oriented and alert    IV Access:  - None    Nursing Mobility/ADLs:  Walking   Independent with walker   Transfer  Independent  Bathing  Independent  Dressing  Independent  Toileting  Independent  Feeding  Independent  Med Admin  Independent  Med Delivery   whole    Wound Care Documentation and Therapy:  Incision 06/26/14 Abdomen (Active)   Number of days: 1392        Elimination:  Continence: Bowel: No  Bladder: No  Urinary Catheter: None   Colostomy/Ileostomy/Ileal Conduit: No       Date of Last BM: 1/24/2023  No intake or output data in the 24 hours ending 01/25/23 1102  No intake/output data recorded.     Safety Concerns:     History of Falls (last 30 days)    Impairments/Disabilities:      None    Nutrition Therapy:  Current Nutrition Therapy:   - Oral Diet:  General    Routes of Feeding: Oral  Liquids: No Restrictions  Daily Fluid Restriction: no  Last Modified Barium Swallow with Video (Video Swallowing Test): not done    Treatments at the Time of Hospital Discharge:   Respiratory Treatments: n/a  Oxygen Therapy:  is not on home oxygen therapy. Ventilator:    - No ventilator support    Rehab Therapies: Physical Therapy and Occupational Therapy  Weight Bearing Status/Restrictions: No weight bearing restrictions  Other Medical Equipment (for information only, NOT a DME order):  walker  Other Treatments: n/a    Patient's personal belongings (please select all that are sent with patient):  Glasses, pant, shirt, jewelry, cell phone    RN SIGNATURE:  Electronically signed by Pat Chang RN on 1/25/23 at 11:05 AM EST    CASE MANAGEMENT/SOCIAL WORK SECTION    Inpatient Status Date: 01/23/2023    Readmission Risk Assessment Score:  Readmission Risk              Risk of Unplanned Readmission:  13           Discharging to Facility/ Agency   Name: [facility]  Address: [address] [place] [address], [address]  Phone: [phone]  Fax: [phone]        / signature: Electronically signed by Maria Corona RN on 1/25/23 at 1:40 PM EST    PHYSICIAN SECTION    Prognosis: Good    Condition at Discharge: Stable    Rehab Potential (if transferring to Rehab): N/A    Recommended Labs or Other Treatments After Discharge: CBC, BMP weekly x 3    Physician Certification: I certify the above information and transfer of Kamryn Laws  is necessary for the continuing treatment of the diagnosis listed and that she requires [facility] for less 30 days.      Update Admission H&P: No change in H&P    PHYSICIAN SIGNATURE:  Electronically signed by AQUILES Merlos CNP on 1/25/23 at 12:19 PM EST

## 2023-01-25 NOTE — CARE COORDINATION
Discharge Plan:     Patient discharged to: [facility], [address]    SW/DC Planner faxed, [address] to: [phone]    Prescriptions faxed were: yes    RN: will call report to: [phone]    Medical Transport with: [facility] [phone]     time: 1915    Family advised of discharge?: Patient will notify family    HENS Submitted?:   yes    All discharge needs met per case management.     BRUCE ValerioN RN    [facility]  Phone: [phone]

## 2023-01-25 NOTE — DISCHARGE SUMMARY
[facility]    Patient Demographics    Patient. Lisha Jenkins  Date of Birth. 1955  MRN. [id number]     Primary care provider. Ross Alejandra MD  (Tel: [phone])    Admit date: 1/23/2023    Discharge date (blank if same as Note Date): Note Date: 1/25/2023     Reason for Hospitalization. Chief Complaint   Patient presents with    Knee Pain     Arrived via [address] [facility] from home d/t right knee pain, fall on right side earlier today; cortisone shot to bilateral knees last 5-6 months ago; right buttocks and back pain that feels different than normal; Eliquis unable to state if LOC; previous fall a month ago; hx of fibromyalgia      Fall         Significant Findings. Principal Problem:    Frequent falls  Active Problems:    ELLIE (obstructive sleep apnea)    PAF (paroxysmal atrial fibrillation) (HCC)    Hyperlipidemia    Hypothyroidism    HTN (hypertension)    Fibromyalgia    Obesity, morbid, BMI 40.0-49.9 ([address]  Resolved Problems:    * No resolved hospital problems. *       Problems and results from this hospitalization that need follow up. Carotid stenosis. Recommend follow up carotid study in 6 months. Added to problem list and report routed to PCP per Epic in basket. Significant test results and incidental findings. CXR 1/23/2023:  No acute cardiopulmonary findings     CT Head 1/23/2023:  No acute intracranial abnormality. CT C-spine 1/23/2023:  No acute abnormality of the cervical spine. CT Lumbar Spine 1/23/2023:  Mild anterior wedging of the T12 vertebral body, in appearance suggestive of   an old fracture. No acute fracture identified. CT Abd / Pelvis 1/23/2023:  No acute process identified. Small hiatal hernia. Status post cholecystectomy and hysterectomy.       Xray Right Shoulder 1/23/2023:  No acute fracture or dislocation     Xray Right Femur 1/23/2023:  No acute osseous or soft tissue abnormality. Degenerative change of the right hip and right knee joint spaces. Xray Right Knee 1/23/2023:  1. No acute fracture is seen. 2.  Suprapatellar joint effusion. Tricompartmental osteoarthritis          MRI Brain 1/24/2023:  1. No acute intracranial abnormality. No acute infarct. 2. Minimal global parenchymal volume loss with minimal chronic microvascular   ischemic changes. Carotid Doppler 1/24/2023:  Summary        -The right internal carotid artery appears to have a 50-69% diameter    reducing stenosis based on velocity criteria. -The left internal carotid artery appears to have a 50-69% diameter reducing    stenosis based on velocity criteria. Recommendations        Recommend follow up study in 6 months. Limited Echo 1/25/2023:  Summary   Technically limited examination to evaluate aortic stenosis. Overall left ventricular systolic function is normal. Estimated EF 60%. The aortic valve area is calculated at .9 cm2 with a maximum pressure gradient of 49 mmHg and a mean pressure gradient of 33 mmHg. This is c/w moderate aortic stenosis. Mild aortic regurgitation   No evidence of any pericardial effusion. No change compared to prior echocardiogram done on 6/22. Invasive procedures and treatments. None     [facility] Course. 70 yo female hx atrial fib (on Eliquis), nonobstructive CAD, HTN, HLD, DM2, hypothyroidism. She presented to ER with c/o unsteady gait and frequent falls. Unsteady gait / Recurrent falls. CT head with no acute abnormality. MRI brain with no acute abnormality. Evidence of orthostatic hypotension when working with PT/OT. TSH 1.41, no deficiency vitamin B12 & folate. Neuro evaluated suspect possible sensory ataxia, polyneuropathy and less likely vascular or new stroke. Would benefit from therapy. Aortic stenosis. Moderate AS on echo 6/2022.  Limited echo showed AS to be stable. Carotid stenosis. Moderate stenosis right carotid, mild stenosis left stenosis on CTA neck 8/2018. Carotid bruit noted on exam vs transmission of AS murmur. Repeat carotid doppler with 50-69% stenosis bilaterally. Recommend repeat study in 6 months. HTN / orthostatic hypotension. Takes amlodipine at [facility], continued on admission. Evidence of orthostatic hypotension while working with PT/OT. Applied knee high JORGE LUIS hose, avoid dehydration. PAF / hx NSVT. Remained in sinus rhythm, Continued Eliquis and sotalol. DM2. Diet controlled. A1c 6.1. Hypothyroidism. TSH 1.41 this admission. Continued levothyroxine 225 mcg daily. UTI. UA with moderate leukocytes (97)  and 4+ bacteria. Denied urinary symptoms, antibiotics held. Culture returned 1/26 with > 100,000 Klebsiella pneumoniae. No prior urine cultures available for comparison. Given presenting symptoms of weakness and falls will treat with Augmentin 500/125 mg BID x 7 days. Add lactobacillus BID. Called to nursing [facility]) at [facility]. Prescriptions faxed ([phone])    Reviewed DC plans, follow up and medications. Expresses understanding. Questions answered. Consults. IP CONSULT TO HOSPITALIST  IP CONSULT TO NEUROLOGY    Physical examination on discharge day. /62   Pulse 74   Temp 97.6 °F (36.4 °C) (Oral)   Resp 17   Ht 5' 8\" (1.727 m)   Wt (!) 303 lb (137.4 kg)   SpO2 94%   BMI 46.07 kg/m²   General appearance. Alert. Looks comfortable. HEENT. Sclera clear. Moist mucus membranes. Cardiovascular. Regular rate and rhythm, normal S1, S2. + BHUPINDER. Respiratory. Not using accessory muscles. Clear to auscultation bilaterally, no wheeze. Gastrointestinal. Abdomen soft, non-tender, not distended, normal bowel sounds  Neurology. Facial symmetry. No speech deficits. Moving all extremities equally. Extremities. No edema in lower extremities. Skin.  Warm, dry, normal turgor    Condition at time of discharge stable    Medication instructions provided to patient at discharge. Medication List        START taking these medications      amoxicillin-clavulanate 500-125 MG per tablet  Commonly known as: Augmentin  Take 1 tablet by mouth in the morning and at bedtime for 7 days     polyethylene glycol 17 g packet  Commonly known as: GLYCOLAX  Take 17 g by mouth daily as needed for Constipation     Probiotic Acidophilus Tabs  Take 1 tablet by mouth 2 times daily for 7 days     traMADol 50 MG tablet  Commonly known as: ULTRAM  Take 1 tablet by mouth every 6 hours as needed for Pain for up to 3 days. Max Daily Amount: 200 mg            CHANGE how you take these medications      levothyroxine 75 MCG tablet  Commonly known as: SYNTHROID  What changed:   medication strength  how much to take  how to take this  when to take this  additional instructions     nortriptyline 50 MG capsule  Commonly known as: PAMELOR  What changed:   medication strength  how to take this  additional instructions            CONTINUE taking these medications      acetaminophen 325 MG tablet  Commonly known as: TYLENOL     amLODIPine 5 MG tablet  Commonly known as: NORVASC  Take 1 tablet by mouth daily     Calcium-Magnesium-Vitamin D 185- MG-MG-UNIT Caps     COQ-10 PO     Eliquis 5 MG Tabs tablet  Generic drug: apixaban  Take 1 tablet by mouth 2 times daily     famotidine 20 MG tablet  Commonly known as: PEPCID     fish oil 1000 MG capsule     fluticasone 50 MCG/ACT nasal spray  Commonly known as: Flonase  2 sprays by Nasal route daily     LORazepam 0.5 MG tablet  Commonly known as: ATIVAN  Take 1 tablet by mouth every 6 hours as needed for Anxiety for up to 3 days.  Max Daily Amount: 2 mg     methocarbamol 500 MG tablet  Commonly known as: ROBAXIN  TAKE 1 TABLET BY MOUTH 4 TIMES DAILY AS NEEDED (PAIN)     pregabalin 50 MG capsule  Commonly known as: LYRICA     sotalol 80 MG tablet  Commonly known as: BETAPACE  Take 1 tablet by mouth 2 times daily therapeutic multivitamin-minerals tablet     Walker Misc  Use with walking or standing at all times to prevent falls. STOP taking these medications      escitalopram 20 MG tablet  Commonly known as: LEXAPRO     HYDROcodone-acetaminophen 5-325 MG per tablet  Commonly known as: NORCO     lidocaine 4 % external patch     nitroGLYCERIN 0.4 MG SL tablet  Commonly known as: Nitrostat     omeprazole 40 MG delayed release capsule  Commonly known as: PRILOSEC     vitamin D 125 MCG (5000 UT) Caps capsule  Commonly known as: CHOLECALCIFEROL               Where to Get Your Medications        You can get these medications from any pharmacy    Bring a paper prescription for each of these medications  amoxicillin-clavulanate 500-125 MG per tablet  LORazepam 0.5 MG tablet  Probiotic Acidophilus Tabs  traMADol 50 MG tablet       Information about where to get these medications is not yet available    Ask your nurse or doctor about these medications  polyethylene glycol 17 g packet         Discharge recommendations given to patient. Follow Up. PCP in 1-2 weeks   Disposition. SNF  Activity. activity as tolerated  Diet: ADULT DIET; Regular      Spent > 30 minutes in discharge process.     Signed:  AQUILES Swan CNP     1/25/2023 3:15 PM

## 2023-01-25 NOTE — PROGRESS NOTES
Candace Carmen  Neurology Follow-up  [facility] Neurology    Date of Service: 1/25/2023    Subjective:   CC: Follow up today regarding: Recurrent falling    Events noted. Chart and lab reviewed. The patient denies any new symptoms today. MRI showed no acute findings. Doppler showed no severe ICA stenosis. Lisinopril was discontinued due to low blood pressure. No headache or chest pain. Still feels unsteady. Awaiting discharge      ROS : A 10-12 system review obtained and updated today and is unremarkable except as mentioned  in my interval history. Past medical history, social history, medication and family history reviewed. Objective:  Exam:   Constitutional:   Vitals:    01/24/23 2130 01/24/23 2238 01/25/23 0409 01/25/23 0812   BP: (!) 152/73  108/62 112/62   Pulse: 76  73 74   Resp: 18 16 17 17   Temp: 97.7 °F (36.5 °C)  97.5 °F (36.4 °C) 97.6 °F (36.4 °C)   TempSrc: Oral  Oral Oral   SpO2: 97%  96% 94%   Weight:       Height:         General appearance:  Normal development and appear in no acute distress. Mental Status:   Oriented to person, place, problem, and time. Memory: Good immediate recall. Intact remote memory  Normal attention span and concentration. Language: intact naming, repeating and fluency   Good fund of Knowledge. Cranial Nerves:   II:   Pupils: equal, round, reactive to light  III,IV,VI: Extra Ocular Movements are intact. No nystagmus  V: Facial sensation is intact  VII: Facial strength and movements: intact and symmetric  XII: Tongue movements are normal  Musculoskeletal: 5/5 in all 4 extremities. Tone: Normal tone. Reflexes: Symmetric 2+ in both arms and legs.   Coordination: no pronator drift, no dysmetria with FNF  Sensation: normal.  Gait/Posture: unsteady gait        Data:  LABS:   Lab Results   Component Value Date/Time     01/25/2023 04:34 AM    K 4.1 01/25/2023 04:34 AM    K 4.4 01/23/2023 04:19 PM     01/25/2023 04:34 AM    CO2 24 01/25/2023 04:34 AM    BUN 17 01/25/2023 04:34 AM    CREATININE 0.7 01/25/2023 04:34 AM    GFRAA >60 06/06/2022 05:24 AM    GFRAA >60 02/13/2013 12:07 PM    LABGLOM >60 01/25/2023 04:34 AM    GLUCOSE 114 01/25/2023 04:34 AM    PHOS 4.1 05/17/2014 07:27 AM    MG 2.10 06/03/2022 01:50 PM    CALCIUM 8.7 01/25/2023 04:34 AM     Lab Results   Component Value Date/Time    WBC 7.2 01/25/2023 04:34 AM    RBC 4.77 01/25/2023 04:34 AM    HGB 12.5 01/25/2023 04:34 AM    HCT 38.3 01/25/2023 04:34 AM    MCV 80.3 01/25/2023 04:34 AM    RDW 15.0 01/25/2023 04:34 AM     01/25/2023 04:34 AM     Lab Results   Component Value Date    INR 1.14 01/23/2023    PROTIME 14.6 (H) 01/23/2023       Neuroimaging was independently reviewed by me and discussed results with the patient  I reviewed blood testing and other test results and discussed results with the patient      Impression:  Recurrent falling, unclear etiology. Possible sensory ataxia, polyneuropathy or drug-induced. MRI showed no acute stroke  A. fib on Eliquis  Hypertension with hypotension  Hyperlipidemia      Recommendation  Monitor blood pressure at her ECF  Continue PT and OT  Continue Eliquis  Neurochecks  Continue current blood pressure medications  Statin  Discussed risk of falling on blood thinner  DC planning  No further recommendation  We will sign off  DW primary team           Dmitri Bahena MD   [phone]      This dictation was generated by voice recognition computer software. Although all attempts are made to edit the dictation for accuracy, there may be errors in the transcription that are not intended.

## 2023-01-25 NOTE — PLAN OF CARE
Problem: Discharge Planning  Goal: Discharge to home or other facility with appropriate resources  1/25/2023 1254 by Luis Keen RN  Outcome: Progressing  1/24/2023 2346 by Irma Sun RN  Outcome: Bethea Leonardoas (Taken 1/24/2023 2130)  Discharge to home or other facility with appropriate resources: Identify barriers to discharge with patient and caregiver     Problem: Pain  Goal: Verbalizes/displays adequate comfort level or baseline comfort level  1/25/2023 1254 by Luis Keen RN  Outcome: Progressing  1/24/2023 2346 by Irma Sun RN  Outcome: Progressing     Problem: Skin/Tissue Integrity  Goal: Absence of new skin breakdown  Description: 1. Monitor for areas of redness and/or skin breakdown  2. Assess vascular access sites hourly  3. Every 4-6 hours minimum:  Change oxygen saturation probe site  4. Every 4-6 hours:  If on nasal continuous positive airway pressure, respiratory therapy assess nares and determine need for appliance change or resting period.   1/25/2023 1254 by Luis Keen RN  Outcome: Progressing  1/24/2023 2346 by Irma Sun RN  Outcome: Progressing     Problem: Safety - Adult  Goal: Free from fall injury  1/25/2023 1254 by Luis Keen RN  Outcome: Progressing  1/24/2023 2346 by Irma Sun RN  Outcome: Progressing     Problem: Chronic Conditions and Co-morbidities  Goal: Patient's chronic conditions and co-morbidity symptoms are monitored and maintained or improved  1/25/2023 1254 by Luis Keen RN  Outcome: Progressing  1/24/2023 2346 by Irma Sun RN  Outcome: Progressing  Flowsheets (Taken 1/24/2023 2130)  Care Plan - Patient's Chronic Conditions and Co-Morbidity Symptoms are Monitored and Maintained or Improved: Monitor and assess patient's chronic conditions and comorbid symptoms for stability, deterioration, or improvement

## 2023-01-25 NOTE — PLAN OF CARE
Problem: Discharge Planning  Goal: Discharge to home or other facility with appropriate resources  Outcome: Progressing  Flowsheets (Taken 1/24/2023 2130)  Discharge to home or other facility with appropriate resources: Identify barriers to discharge with patient and caregiver     Problem: Pain  Goal: Verbalizes/displays adequate comfort level or baseline comfort level  Outcome: Progressing     Problem: Skin/Tissue Integrity  Goal: Absence of new skin breakdown  Description: 1. Monitor for areas of redness and/or skin breakdown  2. Assess vascular access sites hourly  3. Every 4-6 hours minimum:  Change oxygen saturation probe site  4. Every 4-6 hours:  If on nasal continuous positive airway pressure, respiratory therapy assess nares and determine need for appliance change or resting period.   Outcome: Progressing     Problem: Safety - Adult  Goal: Free from fall injury  Outcome: Progressing     Problem: Chronic Conditions and Co-morbidities  Goal: Patient's chronic conditions and co-morbidity symptoms are monitored and maintained or improved  Outcome: Progressing  Flowsheets (Taken 1/24/2023 2130)  Care Plan - Patient's Chronic Conditions and Co-Morbidity Symptoms are Monitored and Maintained or Improved: Monitor and assess patient's chronic conditions and comorbid symptoms for stability, deterioration, or improvement

## 2023-01-26 LAB
ORGANISM: ABNORMAL
URINE CULTURE, ROUTINE: ABNORMAL

## 2023-01-26 NOTE — PROGRESS NOTES
Discharge order received. Nurse called report to nurse at  [facility]. Awaiting medical transport.

## 2023-01-27 PROBLEM — I65.23 BILATERAL CAROTID ARTERY STENOSIS: Status: ACTIVE | Noted: 2023-01-27

## 2023-01-27 RX ORDER — GREEN TEA/HOODIA GORDONII 315-12.5MG
1 CAPSULE ORAL 2 TIMES DAILY
Qty: 14 TABLET | Refills: 0 | Status: SHIPPED | OUTPATIENT
Start: 2023-01-27 | End: 2023-02-03

## 2023-01-27 RX ORDER — AMOXICILLIN AND CLAVULANATE POTASSIUM 500; 125 MG/1; MG/1
1 TABLET, FILM COATED ORAL 2 TIMES DAILY
Qty: 14 TABLET | Refills: 0 | Status: SHIPPED | OUTPATIENT
Start: 2023-01-27 | End: 2023-02-03

## 2023-01-27 NOTE — PROGRESS NOTES
Physician Progress Note      PATIENT:               Aye Jhaveri  CSN #:                  [id number]  :                       1955  ADMIT DATE:       2023 3:35 PM  [address] [place] DATE:        2023 8:19 PM  RESPONDING  PROVIDER #:        Khoi Yun CNP          QUERY TEXT:    Pt admitted with frequent falls and noted unsteady gait. If possible, please   document in the progress notes and discharge summary if you are evaluating and   / or treating any of the following: The medical record reflects the following:  Risk Factors: frequent falls  Clinical Indicators: Per H&P- \"Frequent falls  Unable to walk steadily with multiple contusions but no fracture  She also has multiple musculoskeletal complaints including knee pain, right   shoulder, back  Osteoarthritis of multiple joints\"  Per ED nurse note- \"This nurse attempted to ambulate with pt with walker to   restroom. Pt with increased pain and is unsteady. \"  Per  nurse note- \"Pt is able to ambulate to the bathroom with a walker   without difficulty. \"  Treatment: PT/OT consults, SNF referral  Options provided:  -- Age Related Physical Debility  -- Falls due to other, Please document other cause.   -- Other - I will add my own diagnosis  -- Disagree - Not applicable / Not valid  -- Disagree - Clinically unable to determine / Unknown  -- Refer to Clinical Documentation Reviewer    PROVIDER RESPONSE TEXT:    Falls due to Falls likely combinayion of age related disaility,   osteoarthritis, orthostatic hypotension, and deconditioning,    Query created by: Alonso Flores on 2023 11:39 AM      Electronically signed by:  Donya Yun CNP 2023 10:12 PM

## 2023-03-17 ENCOUNTER — OFFICE VISIT (OUTPATIENT)
Dept: CARDIOLOGY CLINIC | Age: 68
End: 2023-03-17
Payer: MEDICARE

## 2023-03-17 VITALS
WEIGHT: 293 LBS | SYSTOLIC BLOOD PRESSURE: 130 MMHG | DIASTOLIC BLOOD PRESSURE: 80 MMHG | BODY MASS INDEX: 48.66 KG/M2 | HEART RATE: 72 BPM

## 2023-03-17 DIAGNOSIS — E78.5 HYPERLIPIDEMIA, UNSPECIFIED HYPERLIPIDEMIA TYPE: Primary | ICD-10-CM

## 2023-03-17 DIAGNOSIS — I48.0 PAF (PAROXYSMAL ATRIAL FIBRILLATION) (HCC): ICD-10-CM

## 2023-03-17 DIAGNOSIS — I10 HTN (HYPERTENSION), BENIGN: ICD-10-CM

## 2023-03-17 PROCEDURE — G8400 PT W/DXA NO RESULTS DOC: HCPCS | Performed by: INTERNAL MEDICINE

## 2023-03-17 PROCEDURE — 3017F COLORECTAL CA SCREEN DOC REV: CPT | Performed by: INTERNAL MEDICINE

## 2023-03-17 PROCEDURE — G8417 CALC BMI ABV UP PARAM F/U: HCPCS | Performed by: INTERNAL MEDICINE

## 2023-03-17 PROCEDURE — 1090F PRES/ABSN URINE INCON ASSESS: CPT | Performed by: INTERNAL MEDICINE

## 2023-03-17 PROCEDURE — 99214 OFFICE O/P EST MOD 30 MIN: CPT | Performed by: INTERNAL MEDICINE

## 2023-03-17 PROCEDURE — 1123F ACP DISCUSS/DSCN MKR DOCD: CPT | Performed by: INTERNAL MEDICINE

## 2023-03-17 PROCEDURE — G8484 FLU IMMUNIZE NO ADMIN: HCPCS | Performed by: INTERNAL MEDICINE

## 2023-03-17 PROCEDURE — G8427 DOCREV CUR MEDS BY ELIG CLIN: HCPCS | Performed by: INTERNAL MEDICINE

## 2023-03-17 PROCEDURE — 1036F TOBACCO NON-USER: CPT | Performed by: INTERNAL MEDICINE

## 2023-03-17 PROCEDURE — 3079F DIAST BP 80-89 MM HG: CPT | Performed by: INTERNAL MEDICINE

## 2023-03-17 PROCEDURE — 3075F SYST BP GE 130 - 139MM HG: CPT | Performed by: INTERNAL MEDICINE

## 2023-03-17 RX ORDER — AMLODIPINE BESYLATE 5 MG/1
5 TABLET ORAL DAILY
Qty: 90 TABLET | Refills: 3 | Status: SHIPPED | OUTPATIENT
Start: 2023-03-17

## 2023-03-17 ASSESSMENT — ENCOUNTER SYMPTOMS
WHEEZING: 0
GASTROINTESTINAL NEGATIVE: 1
ORTHOPNEA: 0
SNORING: 0
SHORTNESS OF BREATH: 1
SPUTUM PRODUCTION: 0
ALLERGIC/IMMUNOLOGIC NEGATIVE: 1
HEMOPTYSIS: 0
EYES NEGATIVE: 1
SLEEP DISTURBANCES DUE TO BREATHING: 0
COUGH: 0
BACK PAIN: 1

## 2023-03-17 NOTE — PROGRESS NOTES
CC: AF    HPI:  Pt has afib and went to ER on 12/31/20 for SOB. She was discharged with stable vitals. Fatigued and irritable. Has fibromyalgia. No chest pain . Had UTI and fell and was treated in FF. FH:  Mom has MI at 80 lived to 80. D normal.  Brother had AS. SH:  No tobacco now. Quit in 2008. 1/2 ppd for 30 yrs. PMH:  AS mild and fibromyalgia. DDD neck and low back. Dep and anxiety  Depression. Obesity. Meds:  Lisinopril. ECG AF with HR 96 bpm    Vitals:    03/17/23 1604   BP: 130/80   Pulse: 72     Review of Systems   Constitutional: Negative. HENT: Negative. Eyes: Negative. Cardiovascular:  Positive for dyspnea on exertion and palpitations. Negative for chest pain, claudication, cyanosis, irregular heartbeat, leg swelling, near-syncope, orthopnea, paroxysmal nocturnal dyspnea and syncope. Respiratory:  Positive for shortness of breath. Negative for cough, hemoptysis, sleep disturbances due to breathing, snoring, sputum production and wheezing. Endocrine: Negative for cold intolerance, heat intolerance, polydipsia, polyphagia and polyuria. Hematologic/Lymphatic: Negative for adenopathy and bleeding problem. Does not bruise/bleed easily. Skin: Negative. Musculoskeletal:  Positive for arthritis and back pain. Gastrointestinal: Negative. Genitourinary:  Negative for bladder incontinence, decreased libido, dysuria and flank pain. Neurological: Negative. Psychiatric/Behavioral: Negative. Allergic/Immunologic: Negative. Physical Exam  Constitutional:       Appearance: Normal appearance. HENT:      Head: Normocephalic and atraumatic. Nose: Nose normal.   Eyes:      Extraocular Movements: Extraocular movements intact. Pupils: Pupils are equal, round, and reactive to light. Cardiovascular:      Rate and Rhythm: Normal rate. Rhythm irregular. Heart sounds: Murmur heard. No friction rub. No gallop.    Abdominal:      General: Abdomen is flat. There is no distension. Palpations: Abdomen is soft. There is no mass. Tenderness: There is no abdominal tenderness. Hernia: No hernia is present. Musculoskeletal:         General: Normal range of motion. Cervical back: Normal range of motion. Skin:     General: Skin is warm and dry. Capillary Refill: Capillary refill takes 2 to 3 seconds. Neurological:      General: No focal deficit present. Mental Status: She is alert and oriented to person, place, and time. Psychiatric:         Mood and Affect: Mood normal.         Behavior: Behavior normal.       Diagnosis Orders   1. Hyperlipidemia, unspecified hyperlipidemia type        2. HTN (hypertension), benign        3. PAF (paroxysmal atrial fibrillation) (HCC)              AF:  Need to start eliquis 5 bid   in SR clinically. Had UTI and fell and CT was done at [facility] and no problems. HTN:  Lisinopril. Controlled. Hypothyroidism:  Trying to regulate. AS: stable repeat echo after SR is restored with cardioversion. Chest pain:  ECG unchanged. Start imdur 30 mg daily. Nitro sl.

## 2023-05-15 NOTE — TELEPHONE ENCOUNTER
Requested Prescriptions     Pending Prescriptions Disp Refills    apixaban (ELIQUIS) 5 MG TABS tablet [Pharmacy Med Name: ELIQUIS 5MG TABLETS] 180 tablet 3     Sig: TAKE 1 TABLET BY MOUTH TWICE DAILY.             Last Office Visit: 3/17/2023     Next Office Visit: 9/18/2023         Last Labs: [phone]

## 2023-09-28 ENCOUNTER — TELEPHONE (OUTPATIENT)
Dept: CARDIOLOGY CLINIC | Age: 68
End: 2023-09-28

## 2023-09-28 ENCOUNTER — APPOINTMENT (OUTPATIENT)
Dept: GENERAL RADIOLOGY | Age: 68
End: 2023-09-28
Payer: MEDICARE

## 2023-09-28 ENCOUNTER — HOSPITAL ENCOUNTER (EMERGENCY)
Age: 68
Discharge: HOME | End: 2023-09-28
Attending: EMERGENCY MEDICINE
Payer: MEDICARE

## 2023-09-28 VITALS
TEMPERATURE: 98.3 F | HEIGHT: 68 IN | BODY MASS INDEX: 44.41 KG/M2 | WEIGHT: 293 LBS | HEART RATE: 68 BPM | OXYGEN SATURATION: 94 % | SYSTOLIC BLOOD PRESSURE: 152 MMHG | RESPIRATION RATE: 22 BRPM | DIASTOLIC BLOOD PRESSURE: 75 MMHG

## 2023-09-28 DIAGNOSIS — R06.00 DYSPNEA, UNSPECIFIED TYPE: Primary | ICD-10-CM

## 2023-09-28 LAB
ALBUMIN SERPL-MCNC: 3.7 G/DL (ref 3.4–5)
ALBUMIN/GLOB SERPL: 1.2 {RATIO} (ref 1.1–2.2)
ALP SERPL-CCNC: 53 U/L (ref 40–129)
ALT SERPL-CCNC: 10 U/L (ref 10–40)
ANION GAP SERPL CALCULATED.3IONS-SCNC: 11 MMOL/L (ref 3–16)
AST SERPL-CCNC: 16 U/L (ref 15–37)
BASOPHILS # BLD: 0 K/UL (ref 0–0.2)
BASOPHILS NFR BLD: 0.8 %
BILIRUB SERPL-MCNC: 0.4 MG/DL (ref 0–1)
BUN SERPL-MCNC: 12 MG/DL (ref 7–20)
CALCIUM SERPL-MCNC: 9.1 MG/DL (ref 8.3–10.6)
CHLORIDE SERPL-SCNC: 102 MMOL/L (ref 99–110)
CO2 SERPL-SCNC: 25 MMOL/L (ref 21–32)
CREAT SERPL-MCNC: 0.7 MG/DL (ref 0.6–1.2)
DEPRECATED RDW RBC AUTO: 14.8 % (ref 12.4–15.4)
EOSINOPHIL # BLD: 0.2 K/UL (ref 0–0.6)
EOSINOPHIL NFR BLD: 3.9 %
GFR SERPLBLD CREATININE-BSD FMLA CKD-EPI: >60 ML/MIN/{1.73_M2}
GLUCOSE SERPL-MCNC: 130 MG/DL (ref 70–99)
HCT VFR BLD AUTO: 41.5 % (ref 36–48)
HGB BLD-MCNC: 13.3 G/DL (ref 12–16)
LYMPHOCYTES # BLD: 1.8 K/UL (ref 1–5.1)
LYMPHOCYTES NFR BLD: 29.1 %
MCH RBC QN AUTO: 25.9 PG (ref 26–34)
MCHC RBC AUTO-ENTMCNC: 31.9 G/DL (ref 31–36)
MCV RBC AUTO: 81.1 FL (ref 80–100)
MONOCYTES # BLD: 0.4 K/UL (ref 0–1.3)
MONOCYTES NFR BLD: 7.1 %
NEUTROPHILS # BLD: 3.6 K/UL (ref 1.7–7.7)
NEUTROPHILS NFR BLD: 59.1 %
NT-PROBNP SERPL-MCNC: 755 PG/ML (ref 0–124)
PLATELET # BLD AUTO: 318 K/UL (ref 135–450)
PMV BLD AUTO: 7.9 FL (ref 5–10.5)
POTASSIUM SERPL-SCNC: 4 MMOL/L (ref 3.5–5.1)
PROT SERPL-MCNC: 6.8 G/DL (ref 6.4–8.2)
RBC # BLD AUTO: 5.12 M/UL (ref 4–5.2)
SARS-COV-2 RDRP RESP QL NAA+PROBE: NOT DETECTED
SODIUM SERPL-SCNC: 138 MMOL/L (ref 136–145)
TROPONIN, HIGH SENSITIVITY: 10 NG/L (ref 0–14)
TROPONIN, HIGH SENSITIVITY: 12 NG/L (ref 0–14)
WBC # BLD AUTO: 6 K/UL (ref 4–11)

## 2023-09-28 PROCEDURE — 83880 ASSAY OF NATRIURETIC PEPTIDE: CPT

## 2023-09-28 PROCEDURE — 99285 EMERGENCY DEPT VISIT HI MDM: CPT | Performed by: EMERGENCY MEDICINE

## 2023-09-28 PROCEDURE — 80053 COMPREHEN METABOLIC PANEL: CPT

## 2023-09-28 PROCEDURE — 71045 X-RAY EXAM CHEST 1 VIEW: CPT

## 2023-09-28 PROCEDURE — 84484 ASSAY OF TROPONIN QUANT: CPT

## 2023-09-28 PROCEDURE — 93005 ELECTROCARDIOGRAM TRACING: CPT | Performed by: EMERGENCY MEDICINE

## 2023-09-28 PROCEDURE — 85025 COMPLETE CBC W/AUTO DIFF WBC: CPT

## 2023-09-28 PROCEDURE — 36415 COLL VENOUS BLD VENIPUNCTURE: CPT

## 2023-09-28 PROCEDURE — 87635 SARS-COV-2 COVID-19 AMP PRB: CPT

## 2023-09-28 ASSESSMENT — PATIENT HEALTH QUESTIONNAIRE - PHQ9
SUM OF ALL RESPONSES TO PHQ QUESTIONS 1-9: 0
SUM OF ALL RESPONSES TO PHQ9 QUESTIONS 1 & 2: 0
SUM OF ALL RESPONSES TO PHQ QUESTIONS 1-9: 0
SUM OF ALL RESPONSES TO PHQ QUESTIONS 1-9: 0
2. FEELING DOWN, DEPRESSED OR HOPELESS: 0
1. LITTLE INTEREST OR PLEASURE IN DOING THINGS: 0
SUM OF ALL RESPONSES TO PHQ QUESTIONS 1-9: 0

## 2023-09-28 ASSESSMENT — PAIN - FUNCTIONAL ASSESSMENT: PAIN_FUNCTIONAL_ASSESSMENT: 0-10

## 2023-09-28 ASSESSMENT — PAIN SCALES - GENERAL: PAINLEVEL_OUTOF10: 5

## 2023-09-28 ASSESSMENT — LIFESTYLE VARIABLES
HOW OFTEN DO YOU HAVE A DRINK CONTAINING ALCOHOL: NEVER
HOW MANY STANDARD DRINKS CONTAINING ALCOHOL DO YOU HAVE ON A TYPICAL DAY: PATIENT DOES NOT DRINK

## 2023-09-28 ASSESSMENT — PAIN DESCRIPTION - LOCATION: LOCATION: HEAD

## 2023-09-28 NOTE — ED PROVIDER NOTES
[facility] Emergency Department      Pt Name: Agustina Baldwin  MRN: [id number]  [address] [facility] [place] 1955  Date of evaluation: 2023  Provider: Seth Barlow MD  CHIEF COMPLAINT  Chief Complaint   Patient presents with    Shortness of Breath     Shortness of breath onset 4-5 days ago, worse today- worse while laying down. C/o fatigue. HPI  Agustina Baldwin is a 77 y.o. female who presents because of difficulty breathing. She says she has been feeling well for about a month with fatigue. She started having a dry cough and more short of breath for about for 5 days. Symptoms felt worse today while laying down. She got frightened by it so she checked her pulse with the machine she has and indicated she might be in atrial fibrillation. She contacted her doctor who referred her here. She does have a history of paroxysmal atrial fibrillation and aortic stenosis. Denies having any chest pain. She is anticoagulated with Eliquis. REVIEW OF SYSTEMS:  No fever, no abdominal pain, headache Pertinent positives and negatives as per the HPI. All other pertinent review of systems reviewed and negative. Nursing notes reviewed. PAST MEDICAL HISTORY  Past Medical History:   Diagnosis Date    Aortic stenosis     Arthritis     Coronary artery disease     Depression     Fatigue     Fibromyalgia     Hyperlipidemia     Hypertension     Hypothyroidism     Pancreatitis     Type II or unspecified type diabetes mellitus without mention of complication, not stated as uncontrolled      SURGICAL HISTORY  Past Surgical History:   Procedure Laterality Date    ANKLE FRACTURE SURGERY      right     SECTION      CHOLECYSTECTOMY, LAPAROSCOPIC  2014    LAPAROSCOPIC CHOLECYSTECTOMY WITH INTRAOPERATIVE    COLONOSCOPY      ENDOSCOPY, COLON, DIAGNOSTIC      HYSTERECTOMY (CERVIX STATUS UNKNOWN)      LYMPH NODE BIOPSY       MEDICATIONS:  No current facility-administered medications on file prior to encounter.

## 2023-09-28 NOTE — TELEPHONE ENCOUNTER
Patient called stating that she was experiencing some slight SOB, so she took a reading on her [place] which stated that she may be in afib.  We are unable to get the patient in for an EKG today, so she has decided to go to the [facility] ER.   [phone]

## 2023-09-29 LAB
EKG ATRIAL RATE: 84 BPM
EKG DIAGNOSIS: NORMAL
EKG P AXIS: 13 DEGREES
EKG P-R INTERVAL: 132 MS
EKG Q-T INTERVAL: 382 MS
EKG QRS DURATION: 108 MS
EKG QTC CALCULATION (BAZETT): 451 MS
EKG R AXIS: -20 DEGREES
EKG T AXIS: 89 DEGREES
EKG VENTRICULAR RATE: 84 BPM

## 2023-09-29 PROCEDURE — 93010 ELECTROCARDIOGRAM REPORT: CPT | Performed by: INTERNAL MEDICINE

## 2023-10-03 ENCOUNTER — OFFICE VISIT (OUTPATIENT)
Dept: CARDIOLOGY CLINIC | Age: 68
End: 2023-10-03
Payer: MEDICARE

## 2023-10-03 ENCOUNTER — TELEPHONE (OUTPATIENT)
Dept: CARDIOLOGY CLINIC | Age: 68
End: 2023-10-03

## 2023-10-03 VITALS
WEIGHT: 293 LBS | DIASTOLIC BLOOD PRESSURE: 64 MMHG | HEART RATE: 99 BPM | BODY MASS INDEX: 48.66 KG/M2 | SYSTOLIC BLOOD PRESSURE: 110 MMHG

## 2023-10-03 DIAGNOSIS — E66.01 MORBID OBESITY (HCC): ICD-10-CM

## 2023-10-03 DIAGNOSIS — Z51.81 ENCOUNTER FOR MONITORING SOTALOL THERAPY: ICD-10-CM

## 2023-10-03 DIAGNOSIS — I10 ESSENTIAL HYPERTENSION: ICD-10-CM

## 2023-10-03 DIAGNOSIS — Z79.01 ON CONTINUOUS ORAL ANTICOAGULATION: ICD-10-CM

## 2023-10-03 DIAGNOSIS — I35.0 NONRHEUMATIC AORTIC VALVE STENOSIS: ICD-10-CM

## 2023-10-03 DIAGNOSIS — I25.10 CORONARY ARTERY DISEASE INVOLVING NATIVE CORONARY ARTERY OF NATIVE HEART WITHOUT ANGINA PECTORIS: ICD-10-CM

## 2023-10-03 DIAGNOSIS — Z79.899 ENCOUNTER FOR MONITORING SOTALOL THERAPY: ICD-10-CM

## 2023-10-03 DIAGNOSIS — I48.0 PAF (PAROXYSMAL ATRIAL FIBRILLATION) (HCC): Primary | Chronic | ICD-10-CM

## 2023-10-03 PROCEDURE — G8417 CALC BMI ABV UP PARAM F/U: HCPCS | Performed by: NURSE PRACTITIONER

## 2023-10-03 PROCEDURE — 93246 EXT ECG>7D<15D RECORDING: CPT | Performed by: INTERNAL MEDICINE

## 2023-10-03 PROCEDURE — G8427 DOCREV CUR MEDS BY ELIG CLIN: HCPCS | Performed by: NURSE PRACTITIONER

## 2023-10-03 PROCEDURE — G8400 PT W/DXA NO RESULTS DOC: HCPCS | Performed by: NURSE PRACTITIONER

## 2023-10-03 PROCEDURE — 1123F ACP DISCUSS/DSCN MKR DOCD: CPT | Performed by: NURSE PRACTITIONER

## 2023-10-03 PROCEDURE — 1036F TOBACCO NON-USER: CPT | Performed by: NURSE PRACTITIONER

## 2023-10-03 PROCEDURE — 3078F DIAST BP <80 MM HG: CPT | Performed by: NURSE PRACTITIONER

## 2023-10-03 PROCEDURE — 3017F COLORECTAL CA SCREEN DOC REV: CPT | Performed by: NURSE PRACTITIONER

## 2023-10-03 PROCEDURE — 3074F SYST BP LT 130 MM HG: CPT | Performed by: NURSE PRACTITIONER

## 2023-10-03 PROCEDURE — 1090F PRES/ABSN URINE INCON ASSESS: CPT | Performed by: NURSE PRACTITIONER

## 2023-10-03 PROCEDURE — G8484 FLU IMMUNIZE NO ADMIN: HCPCS | Performed by: NURSE PRACTITIONER

## 2023-10-03 PROCEDURE — 93000 ELECTROCARDIOGRAM COMPLETE: CPT | Performed by: NURSE PRACTITIONER

## 2023-10-03 PROCEDURE — 99215 OFFICE O/P EST HI 40 MIN: CPT | Performed by: NURSE PRACTITIONER

## 2023-10-03 NOTE — TELEPHONE ENCOUNTER
Pt called to wanting to reschedule appointment with [facility] due to transportation. I informed her that we unfortunately didn't have openings for today. She says she went to ER and they did an EKG where they for that she was not in AFIB. She believes that she is indeed in Afib. Had an EKG completed at [facility]. Saved in media. She requested a call from [facility] to discuss her symptoms further and for more insight on what she should do next.

## 2023-10-03 NOTE — PROGRESS NOTES
Dr. Ana Nance in 4 weeks  - ECG ordered and results personally reviewed     NSVT/CAD  - [facility] mild/mod ectasia (6/3/2022, Dr. Thomas Lopes), no intervention  - On sotalol 80 mg BID   - Minimal NSVT on monitor  - Lifestyle modification - diet, weight loss - hand out given    HTN  - BP controlled in the office  - On amlodipine 5 mg QD    Mod AS  - Will repeat echo    EF of 05%  No systolic HF  No known CAD  Anticoagulation for AF   No Tobacco use. All questions and concerns were addressed to the patient/family. Alternatives to my treatment were discussed. The note was completed using EMR. Every effort was made to ensure accuracy; however, inadvertent computerized transcription errors may be present. Patient received education regarding their diagnosis, treatment and medications while in the office today. Mdaay Pena [facility]    I  have spent 45 minutes in care of the patient including direct face to face time, chart preparation, reviewing diagnostic testing, other provider notes and coordinating patient care.

## 2023-10-04 ENCOUNTER — NURSE ONLY (OUTPATIENT)
Dept: CARDIOLOGY CLINIC | Age: 68
End: 2023-10-04

## 2023-10-05 ENCOUNTER — HOSPITAL ENCOUNTER (INPATIENT)
Age: 68
LOS: 4 days | Discharge: HOME | DRG: 291 | End: 2023-10-09
Attending: SURGERY | Admitting: SURGERY
Payer: MEDICARE

## 2023-10-05 ENCOUNTER — APPOINTMENT (OUTPATIENT)
Dept: GENERAL RADIOLOGY | Age: 68
DRG: 291 | End: 2023-10-05
Payer: MEDICARE

## 2023-10-05 DIAGNOSIS — R09.89 PULMONARY CONGESTION: Primary | ICD-10-CM

## 2023-10-05 PROBLEM — I50.31 ACUTE DIASTOLIC HEART FAILURE WITH PRESERVED EJECTION FRACTION (HCC): Status: ACTIVE | Noted: 2023-10-05

## 2023-10-05 LAB
ALBUMIN SERPL-MCNC: 3.9 G/DL (ref 3.4–5)
ALBUMIN/GLOB SERPL: 1.3 {RATIO} (ref 1.1–2.2)
ALP SERPL-CCNC: 58 U/L (ref 40–129)
ALT SERPL-CCNC: 13 U/L (ref 10–40)
ANION GAP SERPL CALCULATED.3IONS-SCNC: 10 MMOL/L (ref 3–16)
AST SERPL-CCNC: 13 U/L (ref 15–37)
BASOPHILS # BLD: 0.2 K/UL (ref 0–0.2)
BASOPHILS NFR BLD: 2.5 %
BILIRUB SERPL-MCNC: 0.3 MG/DL (ref 0–1)
BUN SERPL-MCNC: 14 MG/DL (ref 7–20)
CALCIUM SERPL-MCNC: 9.3 MG/DL (ref 8.3–10.6)
CHLORIDE SERPL-SCNC: 101 MMOL/L (ref 99–110)
CO2 SERPL-SCNC: 28 MMOL/L (ref 21–32)
CREAT SERPL-MCNC: 0.8 MG/DL (ref 0.6–1.2)
DEPRECATED RDW RBC AUTO: 14.8 % (ref 12.4–15.4)
EOSINOPHIL # BLD: 0.2 K/UL (ref 0–0.6)
EOSINOPHIL NFR BLD: 2.6 %
GFR SERPLBLD CREATININE-BSD FMLA CKD-EPI: >60 ML/MIN/{1.73_M2}
GLUCOSE SERPL-MCNC: 135 MG/DL (ref 70–99)
HCT VFR BLD AUTO: 41 % (ref 36–48)
HGB BLD-MCNC: 13.2 G/DL (ref 12–16)
LYMPHOCYTES # BLD: 2.4 K/UL (ref 1–5.1)
LYMPHOCYTES NFR BLD: 32.2 %
MCH RBC QN AUTO: 26.1 PG (ref 26–34)
MCHC RBC AUTO-ENTMCNC: 32.2 G/DL (ref 31–36)
MCV RBC AUTO: 81.1 FL (ref 80–100)
MONOCYTES # BLD: 0.6 K/UL (ref 0–1.3)
MONOCYTES NFR BLD: 7.9 %
NEUTROPHILS # BLD: 4.2 K/UL (ref 1.7–7.7)
NEUTROPHILS NFR BLD: 54.8 %
NT-PROBNP SERPL-MCNC: 1288 PG/ML (ref 0–124)
PLATELET # BLD AUTO: 321 K/UL (ref 135–450)
PMV BLD AUTO: 7.8 FL (ref 5–10.5)
POTASSIUM SERPL-SCNC: 3.9 MMOL/L (ref 3.5–5.1)
PROT SERPL-MCNC: 7 G/DL (ref 6.4–8.2)
RBC # BLD AUTO: 5.06 M/UL (ref 4–5.2)
SODIUM SERPL-SCNC: 139 MMOL/L (ref 136–145)
TROPONIN, HIGH SENSITIVITY: 13 NG/L (ref 0–14)
TROPONIN, HIGH SENSITIVITY: 15 NG/L (ref 0–14)
WBC # BLD AUTO: 7.6 K/UL (ref 4–11)

## 2023-10-05 PROCEDURE — 93005 ELECTROCARDIOGRAM TRACING: CPT | Performed by: SURGERY

## 2023-10-05 PROCEDURE — 99285 EMERGENCY DEPT VISIT HI MDM: CPT

## 2023-10-05 PROCEDURE — 84443 ASSAY THYROID STIM HORMONE: CPT

## 2023-10-05 PROCEDURE — 80053 COMPREHEN METABOLIC PANEL: CPT

## 2023-10-05 PROCEDURE — 71045 X-RAY EXAM CHEST 1 VIEW: CPT

## 2023-10-05 PROCEDURE — 6360000002 HC RX W HCPCS: Performed by: NURSE PRACTITIONER

## 2023-10-05 PROCEDURE — 1200000000 HC SEMI PRIVATE

## 2023-10-05 PROCEDURE — 84439 ASSAY OF FREE THYROXINE: CPT

## 2023-10-05 PROCEDURE — 83880 ASSAY OF NATRIURETIC PEPTIDE: CPT

## 2023-10-05 PROCEDURE — 36415 COLL VENOUS BLD VENIPUNCTURE: CPT

## 2023-10-05 PROCEDURE — 84484 ASSAY OF TROPONIN QUANT: CPT

## 2023-10-05 PROCEDURE — 6370000000 HC RX 637 (ALT 250 FOR IP): Performed by: NURSE PRACTITIONER

## 2023-10-05 PROCEDURE — 85025 COMPLETE CBC W/AUTO DIFF WBC: CPT

## 2023-10-05 RX ORDER — PREGABALIN 50 MG/1
50 CAPSULE ORAL 3 TIMES DAILY
Status: DISCONTINUED | OUTPATIENT
Start: 2023-10-06 | End: 2023-10-09 | Stop reason: HOSPADM

## 2023-10-05 RX ORDER — FUROSEMIDE 10 MG/ML
40 INJECTION INTRAMUSCULAR; INTRAVENOUS 2 TIMES DAILY
Status: DISCONTINUED | OUTPATIENT
Start: 2023-10-05 | End: 2023-10-06

## 2023-10-05 RX ORDER — SODIUM CHLORIDE 0.9 % (FLUSH) 0.9 %
5-40 SYRINGE (ML) INJECTION PRN
Status: DISCONTINUED | OUTPATIENT
Start: 2023-10-05 | End: 2023-10-09 | Stop reason: HOSPADM

## 2023-10-05 RX ORDER — ACETAMINOPHEN 650 MG/1
650 SUPPOSITORY RECTAL EVERY 6 HOURS PRN
Status: DISCONTINUED | OUTPATIENT
Start: 2023-10-05 | End: 2023-10-09 | Stop reason: HOSPADM

## 2023-10-05 RX ORDER — FAMOTIDINE 20 MG/1
20 TABLET, FILM COATED ORAL 2 TIMES DAILY
Status: DISCONTINUED | OUTPATIENT
Start: 2023-10-06 | End: 2023-10-09 | Stop reason: HOSPADM

## 2023-10-05 RX ORDER — ACETAMINOPHEN 325 MG/1
650 TABLET ORAL EVERY 6 HOURS PRN
Status: DISCONTINUED | OUTPATIENT
Start: 2023-10-05 | End: 2023-10-09 | Stop reason: HOSPADM

## 2023-10-05 RX ORDER — AMLODIPINE BESYLATE 5 MG/1
5 TABLET ORAL DAILY
Status: DISCONTINUED | OUTPATIENT
Start: 2023-10-06 | End: 2023-10-06

## 2023-10-05 RX ORDER — ASPIRIN 81 MG/1
324 TABLET, CHEWABLE ORAL ONCE
Status: COMPLETED | OUTPATIENT
Start: 2023-10-05 | End: 2023-10-05

## 2023-10-05 RX ORDER — M-VIT,TX,IRON,MINS/CALC/FOLIC 27MG-0.4MG
1 TABLET ORAL DAILY
Status: DISCONTINUED | OUTPATIENT
Start: 2023-10-06 | End: 2023-10-06

## 2023-10-05 RX ORDER — FLUTICASONE PROPIONATE 50 MCG
2 SPRAY, SUSPENSION (ML) NASAL DAILY
Status: DISCONTINUED | OUTPATIENT
Start: 2023-10-06 | End: 2023-10-09 | Stop reason: HOSPADM

## 2023-10-05 RX ORDER — CHLORAL HYDRATE 500 MG
2 CAPSULE ORAL DAILY
Status: DISCONTINUED | OUTPATIENT
Start: 2023-10-06 | End: 2023-10-06 | Stop reason: RX

## 2023-10-05 RX ORDER — FUROSEMIDE 10 MG/ML
40 INJECTION INTRAMUSCULAR; INTRAVENOUS ONCE
Status: COMPLETED | OUTPATIENT
Start: 2023-10-05 | End: 2023-10-05

## 2023-10-05 RX ORDER — MAGNESIUM SULFATE IN WATER 40 MG/ML
2000 INJECTION, SOLUTION INTRAVENOUS PRN
Status: DISCONTINUED | OUTPATIENT
Start: 2023-10-05 | End: 2023-10-09 | Stop reason: HOSPADM

## 2023-10-05 RX ORDER — ONDANSETRON 4 MG/1
4 TABLET, ORALLY DISINTEGRATING ORAL EVERY 8 HOURS PRN
Status: DISCONTINUED | OUTPATIENT
Start: 2023-10-05 | End: 2023-10-09 | Stop reason: HOSPADM

## 2023-10-05 RX ORDER — NORTRIPTYLINE HYDROCHLORIDE 25 MG/1
100 CAPSULE ORAL NIGHTLY
Status: DISCONTINUED | OUTPATIENT
Start: 2023-10-06 | End: 2023-10-06

## 2023-10-05 RX ORDER — POTASSIUM CHLORIDE 20 MEQ/1
40 TABLET, EXTENDED RELEASE ORAL PRN
Status: DISCONTINUED | OUTPATIENT
Start: 2023-10-05 | End: 2023-10-09 | Stop reason: HOSPADM

## 2023-10-05 RX ORDER — POTASSIUM CHLORIDE 7.45 MG/ML
10 INJECTION INTRAVENOUS PRN
Status: DISCONTINUED | OUTPATIENT
Start: 2023-10-05 | End: 2023-10-09 | Stop reason: HOSPADM

## 2023-10-05 RX ORDER — ONDANSETRON 2 MG/ML
4 INJECTION INTRAMUSCULAR; INTRAVENOUS EVERY 6 HOURS PRN
Status: DISCONTINUED | OUTPATIENT
Start: 2023-10-05 | End: 2023-10-09 | Stop reason: HOSPADM

## 2023-10-05 RX ORDER — SODIUM CHLORIDE 0.9 % (FLUSH) 0.9 %
5-40 SYRINGE (ML) INJECTION EVERY 12 HOURS SCHEDULED
Status: DISCONTINUED | OUTPATIENT
Start: 2023-10-05 | End: 2023-10-09 | Stop reason: HOSPADM

## 2023-10-05 RX ORDER — METHOCARBAMOL 500 MG/1
500 TABLET, FILM COATED ORAL 4 TIMES DAILY PRN
Status: DISCONTINUED | OUTPATIENT
Start: 2023-10-05 | End: 2023-10-09 | Stop reason: HOSPADM

## 2023-10-05 RX ORDER — POLYETHYLENE GLYCOL 3350 17 G/17G
17 POWDER, FOR SOLUTION ORAL DAILY PRN
Status: DISCONTINUED | OUTPATIENT
Start: 2023-10-05 | End: 2023-10-09 | Stop reason: HOSPADM

## 2023-10-05 RX ORDER — SOTALOL HYDROCHLORIDE 80 MG/1
80 TABLET ORAL 2 TIMES DAILY
Status: DISCONTINUED | OUTPATIENT
Start: 2023-10-06 | End: 2023-10-09 | Stop reason: HOSPADM

## 2023-10-05 RX ORDER — SODIUM CHLORIDE 9 MG/ML
INJECTION, SOLUTION INTRAVENOUS PRN
Status: DISCONTINUED | OUTPATIENT
Start: 2023-10-05 | End: 2023-10-09 | Stop reason: HOSPADM

## 2023-10-05 RX ADMIN — NITROGLYCERIN 0.5 INCH: 20 OINTMENT TOPICAL at 22:03

## 2023-10-05 RX ADMIN — FUROSEMIDE 40 MG: 10 INJECTION, SOLUTION INTRAMUSCULAR; INTRAVENOUS at 22:03

## 2023-10-05 RX ADMIN — ASPIRIN 324 MG: 81 TABLET, CHEWABLE ORAL at 22:03

## 2023-10-05 ASSESSMENT — ENCOUNTER SYMPTOMS
SHORTNESS OF BREATH: 1
DIARRHEA: 0
VOMITING: 0
CHEST TIGHTNESS: 0
ABDOMINAL PAIN: 0
NAUSEA: 0

## 2023-10-05 ASSESSMENT — PAIN - FUNCTIONAL ASSESSMENT: PAIN_FUNCTIONAL_ASSESSMENT: NONE - DENIES PAIN

## 2023-10-05 NOTE — TELEPHONE ENCOUNTER
Patient called requesting to speak to THE [facility] NP. She states that she has not been able to get any sleep recently because every time she lays down, she is unable to breathe. She states that THE [facility] talked to her about a getting a recliner to combat this.     [phone]

## 2023-10-06 PROBLEM — R09.89 PULMONARY CONGESTION: Status: ACTIVE | Noted: 2023-10-06

## 2023-10-06 PROBLEM — I48.91 ATRIAL FIBRILLATION WITH RVR (HCC): Status: ACTIVE | Noted: 2023-10-06

## 2023-10-06 PROBLEM — I50.33 ACUTE ON CHRONIC DIASTOLIC (CONGESTIVE) HEART FAILURE (HCC): Status: ACTIVE | Noted: 2023-10-05

## 2023-10-06 LAB
ANION GAP SERPL CALCULATED.3IONS-SCNC: 15 MMOL/L (ref 3–16)
BUN SERPL-MCNC: 11 MG/DL (ref 7–20)
CALCIUM SERPL-MCNC: 8.7 MG/DL (ref 8.3–10.6)
CHLORIDE SERPL-SCNC: 97 MMOL/L (ref 99–110)
CHOLEST SERPL-MCNC: 187 MG/DL (ref 0–199)
CO2 SERPL-SCNC: 27 MMOL/L (ref 21–32)
CREAT SERPL-MCNC: 0.7 MG/DL (ref 0.6–1.2)
EKG ATRIAL RATE: 394 BPM
EKG DIAGNOSIS: NORMAL
EKG Q-T INTERVAL: 356 MS
EKG QRS DURATION: 104 MS
EKG QTC CALCULATION (BAZETT): 459 MS
EKG R AXIS: -11 DEGREES
EKG T AXIS: 127 DEGREES
EKG VENTRICULAR RATE: 100 BPM
GFR SERPLBLD CREATININE-BSD FMLA CKD-EPI: >60 ML/MIN/{1.73_M2}
GLUCOSE BLD-MCNC: 158 MG/DL (ref 70–99)
GLUCOSE SERPL-MCNC: 127 MG/DL (ref 70–99)
HDLC SERPL-MCNC: 44 MG/DL (ref 40–60)
LDLC SERPL CALC-MCNC: 107 MG/DL
MAGNESIUM SERPL-MCNC: 1.7 MG/DL (ref 1.8–2.4)
PERFORMED ON: ABNORMAL
POTASSIUM SERPL-SCNC: 3.1 MMOL/L (ref 3.5–5.1)
SODIUM SERPL-SCNC: 139 MMOL/L (ref 136–145)
T4 FREE SERPL-MCNC: 2 NG/DL (ref 0.9–1.8)
TRIGL SERPL-MCNC: 180 MG/DL (ref 0–150)
TSH SERPL DL<=0.005 MIU/L-ACNC: 0.31 UIU/ML (ref 0.27–4.2)
VLDLC SERPL CALC-MCNC: 36 MG/DL

## 2023-10-06 PROCEDURE — 80061 LIPID PANEL: CPT

## 2023-10-06 PROCEDURE — 83735 ASSAY OF MAGNESIUM: CPT

## 2023-10-06 PROCEDURE — 6370000000 HC RX 637 (ALT 250 FOR IP): Performed by: NURSE PRACTITIONER

## 2023-10-06 PROCEDURE — 97166 OT EVAL MOD COMPLEX 45 MIN: CPT

## 2023-10-06 PROCEDURE — 6370000000 HC RX 637 (ALT 250 FOR IP): Performed by: SURGERY

## 2023-10-06 PROCEDURE — 93010 ELECTROCARDIOGRAM REPORT: CPT | Performed by: INTERNAL MEDICINE

## 2023-10-06 PROCEDURE — 80048 BASIC METABOLIC PNL TOTAL CA: CPT

## 2023-10-06 PROCEDURE — 6370000000 HC RX 637 (ALT 250 FOR IP): Performed by: INTERNAL MEDICINE

## 2023-10-06 PROCEDURE — 36415 COLL VENOUS BLD VENIPUNCTURE: CPT

## 2023-10-06 PROCEDURE — 2580000003 HC RX 258: Performed by: SURGERY

## 2023-10-06 PROCEDURE — 97161 PT EVAL LOW COMPLEX 20 MIN: CPT

## 2023-10-06 PROCEDURE — 97530 THERAPEUTIC ACTIVITIES: CPT

## 2023-10-06 PROCEDURE — 99223 1ST HOSP IP/OBS HIGH 75: CPT | Performed by: INTERNAL MEDICINE

## 2023-10-06 PROCEDURE — 6360000002 HC RX W HCPCS: Performed by: SURGERY

## 2023-10-06 PROCEDURE — 1200000000 HC SEMI PRIVATE

## 2023-10-06 PROCEDURE — 6360000002 HC RX W HCPCS: Performed by: PHYSICIAN ASSISTANT

## 2023-10-06 RX ORDER — NORTRIPTYLINE HYDROCHLORIDE 25 MG/1
100 CAPSULE ORAL NIGHTLY
Status: DISCONTINUED | OUTPATIENT
Start: 2023-10-06 | End: 2023-10-06

## 2023-10-06 RX ORDER — GUAIFENESIN 600 MG/1
600 TABLET, EXTENDED RELEASE ORAL 2 TIMES DAILY
Status: DISCONTINUED | OUTPATIENT
Start: 2023-10-06 | End: 2023-10-09 | Stop reason: HOSPADM

## 2023-10-06 RX ORDER — NORTRIPTYLINE HYDROCHLORIDE 25 MG/1
50 CAPSULE ORAL NIGHTLY
Status: DISCONTINUED | OUTPATIENT
Start: 2023-10-06 | End: 2023-10-09 | Stop reason: HOSPADM

## 2023-10-06 RX ORDER — MAGNESIUM SULFATE IN WATER 40 MG/ML
2000 INJECTION, SOLUTION INTRAVENOUS ONCE
Status: COMPLETED | OUTPATIENT
Start: 2023-10-06 | End: 2023-10-06

## 2023-10-06 RX ORDER — FUROSEMIDE 10 MG/ML
40 INJECTION INTRAMUSCULAR; INTRAVENOUS 2 TIMES DAILY
Status: COMPLETED | OUTPATIENT
Start: 2023-10-06 | End: 2023-10-07

## 2023-10-06 RX ORDER — SPIRONOLACTONE 25 MG/1
25 TABLET ORAL DAILY
Status: DISCONTINUED | OUTPATIENT
Start: 2023-10-06 | End: 2023-10-09 | Stop reason: HOSPADM

## 2023-10-06 RX ADMIN — FAMOTIDINE 20 MG: 20 TABLET ORAL at 09:07

## 2023-10-06 RX ADMIN — POTASSIUM CHLORIDE 40 MEQ: 1500 TABLET, EXTENDED RELEASE ORAL at 05:46

## 2023-10-06 RX ADMIN — PREGABALIN 50 MG: 50 CAPSULE ORAL at 09:07

## 2023-10-06 RX ADMIN — ACETAMINOPHEN 650 MG: 325 TABLET ORAL at 20:27

## 2023-10-06 RX ADMIN — APIXABAN 5 MG: 5 TABLET, FILM COATED ORAL at 09:07

## 2023-10-06 RX ADMIN — APIXABAN 5 MG: 5 TABLET, FILM COATED ORAL at 20:27

## 2023-10-06 RX ADMIN — EMPAGLIFLOZIN 10 MG: 10 TABLET, FILM COATED ORAL at 14:50

## 2023-10-06 RX ADMIN — METHOCARBAMOL TABLETS 500 MG: 500 TABLET, COATED ORAL at 13:52

## 2023-10-06 RX ADMIN — SOTALOL HYDROCHLORIDE 80 MG: 80 TABLET ORAL at 00:57

## 2023-10-06 RX ADMIN — Medication 10 ML: at 09:12

## 2023-10-06 RX ADMIN — SOTALOL HYDROCHLORIDE 80 MG: 80 TABLET ORAL at 20:27

## 2023-10-06 RX ADMIN — PREGABALIN 50 MG: 50 CAPSULE ORAL at 00:58

## 2023-10-06 RX ADMIN — PREGABALIN 50 MG: 50 CAPSULE ORAL at 13:52

## 2023-10-06 RX ADMIN — METHOCARBAMOL TABLETS 500 MG: 500 TABLET, COATED ORAL at 02:18

## 2023-10-06 RX ADMIN — AMLODIPINE BESYLATE 5 MG: 5 TABLET ORAL at 09:07

## 2023-10-06 RX ADMIN — Medication 10 ML: at 21:53

## 2023-10-06 RX ADMIN — FAMOTIDINE 20 MG: 20 TABLET ORAL at 20:27

## 2023-10-06 RX ADMIN — FUROSEMIDE 40 MG: 10 INJECTION, SOLUTION INTRAMUSCULAR; INTRAVENOUS at 18:07

## 2023-10-06 RX ADMIN — ACETAMINOPHEN 650 MG: 325 TABLET ORAL at 09:07

## 2023-10-06 RX ADMIN — METHOCARBAMOL TABLETS 500 MG: 500 TABLET, COATED ORAL at 20:27

## 2023-10-06 RX ADMIN — LEVOTHYROXINE SODIUM 225 MCG: 0.07 TABLET ORAL at 05:46

## 2023-10-06 RX ADMIN — FAMOTIDINE 20 MG: 20 TABLET ORAL at 00:58

## 2023-10-06 RX ADMIN — GUAIFENESIN 600 MG: 600 TABLET, EXTENDED RELEASE ORAL at 09:07

## 2023-10-06 RX ADMIN — FUROSEMIDE 40 MG: 10 INJECTION, SOLUTION INTRAMUSCULAR; INTRAVENOUS at 09:07

## 2023-10-06 RX ADMIN — NORTRIPTYLINE HYDROCHLORIDE 50 MG: 25 CAPSULE ORAL at 21:53

## 2023-10-06 RX ADMIN — SOTALOL HYDROCHLORIDE 80 MG: 80 TABLET ORAL at 09:07

## 2023-10-06 RX ADMIN — FLUTICASONE PROPIONATE 2 SPRAY: 50 SPRAY, METERED NASAL at 09:15

## 2023-10-06 RX ADMIN — ACETAMINOPHEN 650 MG: 325 TABLET ORAL at 01:16

## 2023-10-06 RX ADMIN — MAGNESIUM SULFATE HEPTAHYDRATE 2000 MG: 40 INJECTION, SOLUTION INTRAVENOUS at 12:01

## 2023-10-06 RX ADMIN — APIXABAN 5 MG: 5 TABLET, FILM COATED ORAL at 00:58

## 2023-10-06 RX ADMIN — PREGABALIN 50 MG: 50 CAPSULE ORAL at 20:27

## 2023-10-06 RX ADMIN — ACETAMINOPHEN 650 MG: 325 TABLET ORAL at 13:52

## 2023-10-06 RX ADMIN — GUAIFENESIN 600 MG: 600 TABLET, EXTENDED RELEASE ORAL at 20:27

## 2023-10-06 RX ADMIN — SACUBITRIL AND VALSARTAN 1 TABLET: 24; 26 TABLET, FILM COATED ORAL at 20:28

## 2023-10-06 RX ADMIN — Medication 10 ML: at 00:25

## 2023-10-06 RX ADMIN — FUROSEMIDE 40 MG: 10 INJECTION, SOLUTION INTRAMUSCULAR; INTRAVENOUS at 00:25

## 2023-10-06 RX ADMIN — SODIUM CHLORIDE: 9 INJECTION, SOLUTION INTRAVENOUS at 11:59

## 2023-10-06 ASSESSMENT — PAIN SCALES - GENERAL
PAINLEVEL_OUTOF10: 2
PAINLEVEL_OUTOF10: 0
PAINLEVEL_OUTOF10: 7
PAINLEVEL_OUTOF10: 6
PAINLEVEL_OUTOF10: 3
PAINLEVEL_OUTOF10: 5
PAINLEVEL_OUTOF10: 5
PAINLEVEL_OUTOF10: 2

## 2023-10-06 ASSESSMENT — PAIN DESCRIPTION - LOCATION
LOCATION: HEAD
LOCATION: GENERALIZED
LOCATION: GENERALIZED
LOCATION: SHOULDER;BACK

## 2023-10-06 ASSESSMENT — PAIN DESCRIPTION - DESCRIPTORS
DESCRIPTORS: DISCOMFORT;ACHING;SHARP
DESCRIPTORS: ACHING
DESCRIPTORS: ACHING

## 2023-10-06 ASSESSMENT — PAIN - FUNCTIONAL ASSESSMENT
PAIN_FUNCTIONAL_ASSESSMENT: ACTIVITIES ARE NOT PREVENTED
PAIN_FUNCTIONAL_ASSESSMENT: ACTIVITIES ARE NOT PREVENTED

## 2023-10-06 ASSESSMENT — PAIN DESCRIPTION - ORIENTATION
ORIENTATION: RIGHT;LEFT
ORIENTATION: RIGHT;LEFT

## 2023-10-06 ASSESSMENT — PAIN SCALES - WONG BAKER: WONGBAKER_NUMERICALRESPONSE: 2

## 2023-10-06 NOTE — DISCHARGE INSTRUCTIONS
The [place] Sleepiness Scale        The [place] Sleepiness Scale is widely used in the field of sleep medicine as a subjective measure of a patient's sleepiness. The test is a list of eight situations in which you rate your tendency to become sleepy on a scale of 0, no chance to 3, high chance of dozing. Your score is based on a scale of 0 to 24. The scale estimates whether you are experiencing excessive sleepiness that possibly requires medical attention. How Sleepy Are You? How sleepy are you to doze off or fall asleep in the following situations? You should rate your chances of dozing off, not just feeling tired. Even if you have not done some of these things recently try to determine how they would have affected you.  For each situation, decide whether or not you would have:      0 = No chance of dozing         1 = Slight chance of dozing      2 = Moderate chance of  dozing         3 = High change of dozing                  Situation                                                                                                                                                                                                                                                       Chance of Dozing    Sitting and reading                                                                                                                            0 =  [x]  1 =    [] 2 =    [] 3 =    []     Watching TV                                                                                                                                     0 =  [x]  1 =    [] 2 =    [] 3 =    []                                                                                                                                                              Sitting inactive in public place (e.g., a theater or a meeting)                                                            0 =

## 2023-10-06 NOTE — ED PROVIDER NOTES
[facility]        Pt Name: Radha Macias  MRN: [id number]  [facility] 1955  Date of evaluation: 10/5/2023  Provider: AQUILES Paige - KRUPA  PCP: Gloria Vasquez MD  Note Started: 9:12 PM EDT 10/5/23      NETO. I have evaluated this patient. CHIEF COMPLAINT       Chief Complaint   Patient presents with    Shortness of Breath     States she has Afib and has diff sleeping due to SOB, states she sleeps with head propped and feels her heart racing. HISTORY OF PRESENT ILLNESS: 1 or more Elements     History from : Patient    Limitations to history : None    Radha Macias is a 77 y.o. female who presents to the emergency department with complaint of palpitations and shortness of breath, worse when laying flat. Onset of symptoms several days to 1 week ago. History of a.fib, on Betapace and Eliquis, compliant with medications. Patient reports that she is unable to sleep due to the shortness of breath and palpitations when lying down. Also reports bilateral pitting edema. Mild chest pain. Denies any headache, fever, lightheadedness, dizziness, visual disturbances. No neck or back pain. No cough, or congestion. No abdominal pain, nausea, vomiting, diarrhea, constipation, or dysuria. No rash. Nursing Notes were all reviewed and agreed with or any disagreements were addressed in the HPI. REVIEW OF SYSTEMS :      Review of Systems   Constitutional:  Negative for activity change, chills and fever. Respiratory:  Positive for shortness of breath. Negative for chest tightness. Cardiovascular:  Positive for chest pain and leg swelling. Gastrointestinal:  Negative for abdominal pain, diarrhea, nausea and vomiting. Genitourinary:  Negative for dysuria. All other systems reviewed and are negative. Positives and Pertinent negatives as per HPI.      SURGICAL HISTORY     Past Surgical History:   Procedure Laterality Date    ANKLE FRACTURE

## 2023-10-06 NOTE — FLOWSHEET NOTE
10/06/23 1422   Pain Assessment   Pain Level 2   Harman-Baker Pain Rating 2   Response to Pain Intervention Patient satisfied   Side Effects No reported side effects     Pain reassessment complete.

## 2023-10-06 NOTE — PLAN OF CARE
Problem: Discharge Planning  Goal: Discharge to home or other facility with appropriate resources  Outcome: Progressing  Flowsheets (Taken 10/5/2023 5539 by Katie Rehman RN)  Discharge to home or other facility with appropriate resources:   Identify barriers to discharge with patient and caregiver   Arrange for needed discharge resources and transportation as appropriate   Identify discharge learning needs (meds, wound care, etc)   Arrange for interpreters to assist at discharge as needed   Refer to discharge planning if patient needs post-hospital services based on physician order or complex needs related to functional status, cognitive ability or social support system     Problem: Safety - Adult  Goal: Free from fall injury  Outcome: Progressing  Flowsheets (Taken 10/6/2023 1520)  Free From Fall Injury: Instruct family/caregiver on patient safety

## 2023-10-06 NOTE — ED NOTES
ED TO INPATIENT SBAR HANDOFF    Patient Name: Lorri Doll   :  1955  77 y.o. MRN:  [id number]  Preferred Name  Deniz Rojas  ED Room #:  ED-0010/10  Family/Caregiver Present no   Restraints no   Sitter no   Sepsis Risk Score Sepsis Risk Score: 2.29    Situation  Code Status: Prior No additional code details. Allergies: Cephalosporins, Dilaudid [hydromorphone hcl], Fortaz [ceftazidime], and Statins  Weight: Patient Vitals for the past 96 hrs (Last 3 readings):   Weight   10/05/23 1938 (!) 316 lb (143.3 kg)     Arrived from: home  Chief Complaint:   Chief Complaint   Patient presents with    Shortness of Breath     States she has Afib and has diff sleeping due to SOB, states she sleeps with head propped and feels her heart racing. Hospital Problem/Diagnosis:  Principal Problem:    Acute diastolic heart failure with preserved ejection fraction (HCC)  Resolved Problems:    * No resolved hospital problems. *    Imaging:   XR CHEST PORTABLE   Final Result   Pulmonary vascular congestion.            Abnormal labs:   Abnormal Labs Reviewed   COMPREHENSIVE METABOLIC PANEL - Abnormal; Notable for the following components:       Result Value    Glucose 135 (*)     AST 13 (*)     All other components within normal limits   BRAIN NATRIURETIC PEPTIDE - Abnormal; Notable for the following components:    Pro-BNP 1,288 (*)     All other components within normal limits     Critical values: yes     Abnormal Assessment Findings: BNP    Background  History:   Past Medical History:   Diagnosis Date    Aortic stenosis     Arthritis     Coronary artery disease     Depression     Fatigue     Fibromyalgia     Hyperlipidemia     Hypertension     Hypothyroidism     Pancreatitis     Type II or unspecified type diabetes mellitus without mention of complication, not stated as uncontrolled        Assessment    Vitals/MEWS:    Level of Consciousness: Alert (0)   Vitals:    10/05/23 1941 10/05/23 2030 10/05/23 2045 10/05/23 2145

## 2023-10-07 LAB
ANION GAP SERPL CALCULATED.3IONS-SCNC: 11 MMOL/L (ref 3–16)
BUN SERPL-MCNC: 18 MG/DL (ref 7–20)
CALCIUM SERPL-MCNC: 9.2 MG/DL (ref 8.3–10.6)
CHLORIDE SERPL-SCNC: 99 MMOL/L (ref 99–110)
CO2 SERPL-SCNC: 29 MMOL/L (ref 21–32)
CREAT SERPL-MCNC: 1 MG/DL (ref 0.6–1.2)
GFR SERPLBLD CREATININE-BSD FMLA CKD-EPI: >60 ML/MIN/{1.73_M2}
GLUCOSE BLD-MCNC: 116 MG/DL (ref 70–99)
GLUCOSE BLD-MCNC: 156 MG/DL (ref 70–99)
GLUCOSE SERPL-MCNC: 137 MG/DL (ref 70–99)
MAGNESIUM SERPL-MCNC: 1.9 MG/DL (ref 1.8–2.4)
PERFORMED ON: ABNORMAL
PERFORMED ON: ABNORMAL
POTASSIUM SERPL-SCNC: 3.3 MMOL/L (ref 3.5–5.1)
SODIUM SERPL-SCNC: 139 MMOL/L (ref 136–145)

## 2023-10-07 PROCEDURE — 6360000002 HC RX W HCPCS: Performed by: SURGERY

## 2023-10-07 PROCEDURE — 6370000000 HC RX 637 (ALT 250 FOR IP): Performed by: INTERNAL MEDICINE

## 2023-10-07 PROCEDURE — 80048 BASIC METABOLIC PNL TOTAL CA: CPT

## 2023-10-07 PROCEDURE — 6370000000 HC RX 637 (ALT 250 FOR IP): Performed by: SURGERY

## 2023-10-07 PROCEDURE — 6360000004 HC RX CONTRAST MEDICATION: Performed by: PHYSICIAN ASSISTANT

## 2023-10-07 PROCEDURE — 83735 ASSAY OF MAGNESIUM: CPT

## 2023-10-07 PROCEDURE — 99233 SBSQ HOSP IP/OBS HIGH 50: CPT | Performed by: CLINICAL NURSE SPECIALIST

## 2023-10-07 PROCEDURE — C8929 TTE W OR WO FOL WCON,DOPPLER: HCPCS

## 2023-10-07 PROCEDURE — 36415 COLL VENOUS BLD VENIPUNCTURE: CPT

## 2023-10-07 PROCEDURE — 6370000000 HC RX 637 (ALT 250 FOR IP): Performed by: NURSE PRACTITIONER

## 2023-10-07 PROCEDURE — 1200000000 HC SEMI PRIVATE

## 2023-10-07 PROCEDURE — 2580000003 HC RX 258: Performed by: SURGERY

## 2023-10-07 RX ORDER — INSULIN LISPRO 100 [IU]/ML
0-8 INJECTION, SOLUTION INTRAVENOUS; SUBCUTANEOUS
Status: DISCONTINUED | OUTPATIENT
Start: 2023-10-07 | End: 2023-10-09 | Stop reason: HOSPADM

## 2023-10-07 RX ORDER — DEXTROSE MONOHYDRATE 100 MG/ML
INJECTION, SOLUTION INTRAVENOUS CONTINUOUS PRN
Status: DISCONTINUED | OUTPATIENT
Start: 2023-10-07 | End: 2023-10-09 | Stop reason: HOSPADM

## 2023-10-07 RX ORDER — LANOLIN ALCOHOL/MO/W.PET/CERES
6 CREAM (GRAM) TOPICAL NIGHTLY PRN
Status: DISCONTINUED | OUTPATIENT
Start: 2023-10-07 | End: 2023-10-09 | Stop reason: HOSPADM

## 2023-10-07 RX ORDER — INSULIN LISPRO 100 [IU]/ML
0-4 INJECTION, SOLUTION INTRAVENOUS; SUBCUTANEOUS NIGHTLY
Status: DISCONTINUED | OUTPATIENT
Start: 2023-10-07 | End: 2023-10-09 | Stop reason: HOSPADM

## 2023-10-07 RX ORDER — GLUCAGON 1 MG/ML
1 KIT INJECTION PRN
Status: DISCONTINUED | OUTPATIENT
Start: 2023-10-07 | End: 2023-10-09 | Stop reason: HOSPADM

## 2023-10-07 RX ORDER — DIPHENHYDRAMINE HCL 25 MG
25 TABLET ORAL NIGHTLY PRN
Status: DISCONTINUED | OUTPATIENT
Start: 2023-10-07 | End: 2023-10-09 | Stop reason: HOSPADM

## 2023-10-07 RX ADMIN — MELATONIN TAB 3 MG 6 MG: 3 TAB at 21:03

## 2023-10-07 RX ADMIN — PREGABALIN 50 MG: 50 CAPSULE ORAL at 20:53

## 2023-10-07 RX ADMIN — SPIRONOLACTONE 25 MG: 25 TABLET ORAL at 08:31

## 2023-10-07 RX ADMIN — POTASSIUM CHLORIDE 40 MEQ: 1500 TABLET, EXTENDED RELEASE ORAL at 18:19

## 2023-10-07 RX ADMIN — EMPAGLIFLOZIN 10 MG: 10 TABLET, FILM COATED ORAL at 08:30

## 2023-10-07 RX ADMIN — PREGABALIN 50 MG: 50 CAPSULE ORAL at 08:28

## 2023-10-07 RX ADMIN — Medication 10 ML: at 20:54

## 2023-10-07 RX ADMIN — NORTRIPTYLINE HYDROCHLORIDE 50 MG: 25 CAPSULE ORAL at 20:55

## 2023-10-07 RX ADMIN — FUROSEMIDE 40 MG: 10 INJECTION, SOLUTION INTRAMUSCULAR; INTRAVENOUS at 08:32

## 2023-10-07 RX ADMIN — Medication 10 ML: at 08:32

## 2023-10-07 RX ADMIN — APIXABAN 5 MG: 5 TABLET, FILM COATED ORAL at 08:30

## 2023-10-07 RX ADMIN — SACUBITRIL AND VALSARTAN 1 TABLET: 24; 26 TABLET, FILM COATED ORAL at 08:28

## 2023-10-07 RX ADMIN — GUAIFENESIN 600 MG: 600 TABLET, EXTENDED RELEASE ORAL at 08:30

## 2023-10-07 RX ADMIN — FAMOTIDINE 20 MG: 20 TABLET ORAL at 08:29

## 2023-10-07 RX ADMIN — PERFLUTREN 1.5 ML: 6.52 INJECTION, SUSPENSION INTRAVENOUS at 10:50

## 2023-10-07 RX ADMIN — SOTALOL HYDROCHLORIDE 80 MG: 80 TABLET ORAL at 08:28

## 2023-10-07 RX ADMIN — APIXABAN 5 MG: 5 TABLET, FILM COATED ORAL at 20:53

## 2023-10-07 RX ADMIN — SACUBITRIL AND VALSARTAN 1 TABLET: 24; 26 TABLET, FILM COATED ORAL at 20:52

## 2023-10-07 RX ADMIN — LEVOTHYROXINE SODIUM 225 MCG: 0.07 TABLET ORAL at 06:23

## 2023-10-07 RX ADMIN — METHOCARBAMOL TABLETS 500 MG: 500 TABLET, COATED ORAL at 18:12

## 2023-10-07 RX ADMIN — GUAIFENESIN 600 MG: 600 TABLET, EXTENDED RELEASE ORAL at 20:53

## 2023-10-07 RX ADMIN — SOTALOL HYDROCHLORIDE 80 MG: 80 TABLET ORAL at 20:52

## 2023-10-07 RX ADMIN — ACETAMINOPHEN 650 MG: 325 TABLET ORAL at 18:12

## 2023-10-07 RX ADMIN — FAMOTIDINE 20 MG: 20 TABLET ORAL at 20:53

## 2023-10-07 ASSESSMENT — PAIN SCALES - GENERAL
PAINLEVEL_OUTOF10: 4
PAINLEVEL_OUTOF10: 7
PAINLEVEL_OUTOF10: 0
PAINLEVEL_OUTOF10: 0

## 2023-10-07 ASSESSMENT — PAIN DESCRIPTION - LOCATION: LOCATION: BACK;LEG

## 2023-10-08 LAB
ANION GAP SERPL CALCULATED.3IONS-SCNC: 13 MMOL/L (ref 3–16)
BACTERIA URNS QL MICRO: ABNORMAL /HPF
BILIRUB UR QL STRIP.AUTO: NEGATIVE
BUN SERPL-MCNC: 20 MG/DL (ref 7–20)
CALCIUM SERPL-MCNC: 9.4 MG/DL (ref 8.3–10.6)
CHLORIDE SERPL-SCNC: 99 MMOL/L (ref 99–110)
CLARITY UR: CLEAR
CO2 SERPL-SCNC: 27 MMOL/L (ref 21–32)
COLOR UR: YELLOW
CREAT SERPL-MCNC: 1.1 MG/DL (ref 0.6–1.2)
EPI CELLS #/AREA URNS AUTO: 1 /HPF (ref 0–5)
EST. AVERAGE GLUCOSE BLD GHB EST-MCNC: 125.5 MG/DL
GFR SERPLBLD CREATININE-BSD FMLA CKD-EPI: 54 ML/MIN/{1.73_M2}
GLUCOSE BLD-MCNC: 122 MG/DL (ref 70–99)
GLUCOSE BLD-MCNC: 123 MG/DL (ref 70–99)
GLUCOSE BLD-MCNC: 127 MG/DL (ref 70–99)
GLUCOSE BLD-MCNC: 144 MG/DL (ref 70–99)
GLUCOSE SERPL-MCNC: 140 MG/DL (ref 70–99)
GLUCOSE UR STRIP.AUTO-MCNC: >=1000 MG/DL
HBA1C MFR BLD: 6 %
HGB UR QL STRIP.AUTO: NEGATIVE
HYALINE CASTS #/AREA URNS AUTO: 0 /LPF (ref 0–8)
KETONES UR STRIP.AUTO-MCNC: NEGATIVE MG/DL
LEUKOCYTE ESTERASE UR QL STRIP.AUTO: ABNORMAL
MAGNESIUM SERPL-MCNC: 1.9 MG/DL (ref 1.8–2.4)
NITRITE UR QL STRIP.AUTO: NEGATIVE
NT-PROBNP SERPL-MCNC: 337 PG/ML (ref 0–124)
PERFORMED ON: ABNORMAL
PH UR STRIP.AUTO: 5 [PH] (ref 5–8)
POTASSIUM SERPL-SCNC: 3.5 MMOL/L (ref 3.5–5.1)
PROT UR STRIP.AUTO-MCNC: NEGATIVE MG/DL
RBC CLUMPS #/AREA URNS AUTO: 0 /HPF (ref 0–4)
SODIUM SERPL-SCNC: 139 MMOL/L (ref 136–145)
SP GR UR STRIP.AUTO: 1.01 (ref 1–1.03)
UA DIPSTICK W REFLEX MICRO PNL UR: YES
URN SPEC COLLECT METH UR: ABNORMAL
UROBILINOGEN UR STRIP-ACNC: 0.2 E.U./DL
WBC #/AREA URNS AUTO: 8 /HPF (ref 0–5)

## 2023-10-08 PROCEDURE — 6370000000 HC RX 637 (ALT 250 FOR IP): Performed by: NURSE PRACTITIONER

## 2023-10-08 PROCEDURE — 83036 HEMOGLOBIN GLYCOSYLATED A1C: CPT

## 2023-10-08 PROCEDURE — 6370000000 HC RX 637 (ALT 250 FOR IP): Performed by: SURGERY

## 2023-10-08 PROCEDURE — 81001 URINALYSIS AUTO W/SCOPE: CPT

## 2023-10-08 PROCEDURE — 2580000003 HC RX 258: Performed by: SURGERY

## 2023-10-08 PROCEDURE — 36415 COLL VENOUS BLD VENIPUNCTURE: CPT

## 2023-10-08 PROCEDURE — 83735 ASSAY OF MAGNESIUM: CPT

## 2023-10-08 PROCEDURE — 80048 BASIC METABOLIC PNL TOTAL CA: CPT

## 2023-10-08 PROCEDURE — 6370000000 HC RX 637 (ALT 250 FOR IP): Performed by: INTERNAL MEDICINE

## 2023-10-08 PROCEDURE — 1200000000 HC SEMI PRIVATE

## 2023-10-08 PROCEDURE — 6370000000 HC RX 637 (ALT 250 FOR IP): Performed by: CLINICAL NURSE SPECIALIST

## 2023-10-08 PROCEDURE — 99232 SBSQ HOSP IP/OBS MODERATE 35: CPT | Performed by: CLINICAL NURSE SPECIALIST

## 2023-10-08 PROCEDURE — 83880 ASSAY OF NATRIURETIC PEPTIDE: CPT

## 2023-10-08 RX ORDER — FUROSEMIDE 40 MG/1
40 TABLET ORAL DAILY
Qty: 60 TABLET | Refills: 0 | Status: SHIPPED | OUTPATIENT
Start: 2023-10-08

## 2023-10-08 RX ORDER — FUROSEMIDE 40 MG/1
40 TABLET ORAL DAILY
Status: DISCONTINUED | OUTPATIENT
Start: 2023-10-08 | End: 2023-10-09 | Stop reason: HOSPADM

## 2023-10-08 RX ORDER — SPIRONOLACTONE 25 MG/1
25 TABLET ORAL DAILY
Qty: 30 TABLET | Refills: 0 | Status: SHIPPED | OUTPATIENT
Start: 2023-10-09

## 2023-10-08 RX ADMIN — METHOCARBAMOL TABLETS 500 MG: 500 TABLET, COATED ORAL at 21:09

## 2023-10-08 RX ADMIN — Medication 10 ML: at 08:27

## 2023-10-08 RX ADMIN — GUAIFENESIN 600 MG: 600 TABLET, EXTENDED RELEASE ORAL at 21:09

## 2023-10-08 RX ADMIN — SACUBITRIL AND VALSARTAN 1 TABLET: 24; 26 TABLET, FILM COATED ORAL at 21:09

## 2023-10-08 RX ADMIN — SPIRONOLACTONE 25 MG: 25 TABLET ORAL at 08:26

## 2023-10-08 RX ADMIN — LEVOTHYROXINE SODIUM 225 MCG: 0.07 TABLET ORAL at 06:27

## 2023-10-08 RX ADMIN — FUROSEMIDE 40 MG: 40 TABLET ORAL at 12:28

## 2023-10-08 RX ADMIN — SACUBITRIL AND VALSARTAN 1 TABLET: 24; 26 TABLET, FILM COATED ORAL at 08:25

## 2023-10-08 RX ADMIN — SOTALOL HYDROCHLORIDE 80 MG: 80 TABLET ORAL at 21:10

## 2023-10-08 RX ADMIN — EMPAGLIFLOZIN 10 MG: 10 TABLET, FILM COATED ORAL at 08:26

## 2023-10-08 RX ADMIN — SOTALOL HYDROCHLORIDE 80 MG: 80 TABLET ORAL at 08:26

## 2023-10-08 RX ADMIN — FAMOTIDINE 20 MG: 20 TABLET ORAL at 08:25

## 2023-10-08 RX ADMIN — NORTRIPTYLINE HYDROCHLORIDE 50 MG: 25 CAPSULE ORAL at 21:13

## 2023-10-08 RX ADMIN — ACETAMINOPHEN 650 MG: 325 TABLET ORAL at 21:10

## 2023-10-08 RX ADMIN — GUAIFENESIN 600 MG: 600 TABLET, EXTENDED RELEASE ORAL at 08:26

## 2023-10-08 RX ADMIN — FAMOTIDINE 20 MG: 20 TABLET ORAL at 21:09

## 2023-10-08 RX ADMIN — FLUTICASONE PROPIONATE 2 SPRAY: 50 SPRAY, METERED NASAL at 08:27

## 2023-10-08 RX ADMIN — PREGABALIN 50 MG: 50 CAPSULE ORAL at 21:09

## 2023-10-08 RX ADMIN — APIXABAN 5 MG: 5 TABLET, FILM COATED ORAL at 08:26

## 2023-10-08 RX ADMIN — PREGABALIN 50 MG: 50 CAPSULE ORAL at 08:26

## 2023-10-08 RX ADMIN — APIXABAN 5 MG: 5 TABLET, FILM COATED ORAL at 21:09

## 2023-10-08 RX ADMIN — Medication 10 ML: at 21:11

## 2023-10-08 ASSESSMENT — PAIN SCALES - GENERAL: PAINLEVEL_OUTOF10: 6

## 2023-10-08 NOTE — PLAN OF CARE
Problem: Discharge Planning  Goal: Discharge to home or other facility with appropriate resources  Outcome: Progressing     Problem: Safety - Adult  Goal: Free from fall injury  Outcome: Progressing     Problem: ABCDS Injury Assessment  Goal: Absence of physical injury  Outcome: Progressing     Problem: Pain  Goal: Verbalizes/displays adequate comfort level or baseline comfort level  Outcome: Progressing     Problem: Chronic Conditions and Co-morbidities  Goal: Patient's chronic conditions and co-morbidity symptoms are monitored and maintained or improved  Outcome: Progressing     Problem: Metabolic/Fluid and Electrolytes - Adult  Goal: Electrolytes maintained within normal limits  Outcome: Progressing     Problem: Metabolic/Fluid and Electrolytes - Adult  Goal: Hemodynamic stability and optimal renal function maintained  Outcome: Progressing

## 2023-10-09 VITALS
HEART RATE: 75 BPM | HEIGHT: 68 IN | SYSTOLIC BLOOD PRESSURE: 120 MMHG | TEMPERATURE: 97.2 F | OXYGEN SATURATION: 96 % | RESPIRATION RATE: 22 BRPM | WEIGHT: 293 LBS | BODY MASS INDEX: 44.41 KG/M2 | DIASTOLIC BLOOD PRESSURE: 72 MMHG

## 2023-10-09 PROBLEM — E66.813 CLASS 3 SEVERE OBESITY DUE TO EXCESS CALORIES WITH SERIOUS COMORBIDITY AND BODY MASS INDEX (BMI) OF 45.0 TO 49.9 IN ADULT: Status: ACTIVE | Noted: 2017-11-24

## 2023-10-09 LAB
ANION GAP SERPL CALCULATED.3IONS-SCNC: 14 MMOL/L (ref 3–16)
BUN SERPL-MCNC: 21 MG/DL (ref 7–20)
CALCIUM SERPL-MCNC: 9.2 MG/DL (ref 8.3–10.6)
CHLORIDE SERPL-SCNC: 100 MMOL/L (ref 99–110)
CO2 SERPL-SCNC: 25 MMOL/L (ref 21–32)
CREAT SERPL-MCNC: 1 MG/DL (ref 0.6–1.2)
GFR SERPLBLD CREATININE-BSD FMLA CKD-EPI: >60 ML/MIN/{1.73_M2}
GLUCOSE BLD-MCNC: 118 MG/DL (ref 70–99)
GLUCOSE BLD-MCNC: 128 MG/DL (ref 70–99)
GLUCOSE SERPL-MCNC: 132 MG/DL (ref 70–99)
MAGNESIUM SERPL-MCNC: 2 MG/DL (ref 1.8–2.4)
PERFORMED ON: ABNORMAL
PERFORMED ON: ABNORMAL
POTASSIUM SERPL-SCNC: 3.5 MMOL/L (ref 3.5–5.1)
SODIUM SERPL-SCNC: 139 MMOL/L (ref 136–145)

## 2023-10-09 PROCEDURE — 93005 ELECTROCARDIOGRAM TRACING: CPT | Performed by: INTERNAL MEDICINE

## 2023-10-09 PROCEDURE — 6370000000 HC RX 637 (ALT 250 FOR IP): Performed by: SURGERY

## 2023-10-09 PROCEDURE — 6370000000 HC RX 637 (ALT 250 FOR IP): Performed by: CLINICAL NURSE SPECIALIST

## 2023-10-09 PROCEDURE — 80048 BASIC METABOLIC PNL TOTAL CA: CPT

## 2023-10-09 PROCEDURE — 2580000003 HC RX 258: Performed by: SURGERY

## 2023-10-09 PROCEDURE — 83735 ASSAY OF MAGNESIUM: CPT

## 2023-10-09 PROCEDURE — 99232 SBSQ HOSP IP/OBS MODERATE 35: CPT | Performed by: CLINICAL NURSE SPECIALIST

## 2023-10-09 PROCEDURE — 6370000000 HC RX 637 (ALT 250 FOR IP): Performed by: INTERNAL MEDICINE

## 2023-10-09 PROCEDURE — 2500000003 HC RX 250 WO HCPCS

## 2023-10-09 PROCEDURE — 36415 COLL VENOUS BLD VENIPUNCTURE: CPT

## 2023-10-09 PROCEDURE — 92960 CARDIOVERSION ELECTRIC EXT: CPT

## 2023-10-09 PROCEDURE — 99232 SBSQ HOSP IP/OBS MODERATE 35: CPT | Performed by: NURSE PRACTITIONER

## 2023-10-09 PROCEDURE — 5A2204Z RESTORATION OF CARDIAC RHYTHM, SINGLE: ICD-10-PCS | Performed by: INTERNAL MEDICINE

## 2023-10-09 PROCEDURE — 7100000010 HC PHASE II RECOVERY - FIRST 15 MIN

## 2023-10-09 RX ADMIN — APIXABAN 5 MG: 5 TABLET, FILM COATED ORAL at 08:45

## 2023-10-09 RX ADMIN — SOTALOL HYDROCHLORIDE 80 MG: 80 TABLET ORAL at 08:45

## 2023-10-09 RX ADMIN — SPIRONOLACTONE 25 MG: 25 TABLET ORAL at 11:22

## 2023-10-09 RX ADMIN — Medication 10 ML: at 08:51

## 2023-10-09 RX ADMIN — GUAIFENESIN 600 MG: 600 TABLET, EXTENDED RELEASE ORAL at 08:45

## 2023-10-09 RX ADMIN — PREGABALIN 50 MG: 50 CAPSULE ORAL at 08:45

## 2023-10-09 RX ADMIN — SACUBITRIL AND VALSARTAN 1 TABLET: 24; 26 TABLET, FILM COATED ORAL at 08:45

## 2023-10-09 RX ADMIN — EMPAGLIFLOZIN 10 MG: 10 TABLET, FILM COATED ORAL at 08:45

## 2023-10-09 RX ADMIN — FLUTICASONE PROPIONATE 2 SPRAY: 50 SPRAY, METERED NASAL at 08:49

## 2023-10-09 RX ADMIN — FUROSEMIDE 40 MG: 40 TABLET ORAL at 11:22

## 2023-10-09 RX ADMIN — LEVOTHYROXINE SODIUM 225 MCG: 0.07 TABLET ORAL at 05:47

## 2023-10-09 RX ADMIN — ACETAMINOPHEN 650 MG: 325 TABLET ORAL at 11:22

## 2023-10-09 RX ADMIN — PREGABALIN 50 MG: 50 CAPSULE ORAL at 12:57

## 2023-10-09 RX ADMIN — FAMOTIDINE 20 MG: 20 TABLET ORAL at 08:45

## 2023-10-09 RX ADMIN — METHOCARBAMOL TABLETS 500 MG: 500 TABLET, COATED ORAL at 08:47

## 2023-10-09 ASSESSMENT — PAIN SCALES - GENERAL
PAINLEVEL_OUTOF10: 8
PAINLEVEL_OUTOF10: 3
PAINLEVEL_OUTOF10: 5
PAINLEVEL_OUTOF10: 6
PAINLEVEL_OUTOF10: 3
PAINLEVEL_OUTOF10: 0
PAINLEVEL_OUTOF10: 0

## 2023-10-09 ASSESSMENT — PAIN DESCRIPTION - LOCATION
LOCATION: GENERALIZED
LOCATION: GENERALIZED

## 2023-10-09 NOTE — CARE COORDINATION
Discharge Planning Note:    Chart reviewed for discharge needs  and it appears that patient has minimal needs for discharge at this time. Risk Score 14 %     Primary Care Physician is Dr Joe Mcclellan  Primary insurance is [facility] Medicare    Please notify case management if any discharge needs are identified. Case management will continue to follow progress and update discharge plan as needed. Updates;  PT & OT recommending skilled hhc services, patient declined.

## 2023-10-09 NOTE — DISCHARGE INSTR - COC
transferring to Rehab): Good    Recommended Labs or Other Treatments After Discharge:     Physician Certification: I certify the above information and transfer of Praneeth Evans  is necessary for the continuing treatment of the diagnosis listed and that she requires Home Care for less 30 days.      Update Admission H&P: No change in H&P    PHYSICIAN SIGNATURE:  Electronically signed by Dorian Quinones PA-C on 10/9/23 at 1:19 PM EDT

## 2023-10-09 NOTE — CARE COORDINATION
Case Management -  Discharge Note      Patient Name: Julieta Camara                   YOB: 1955            Readmission Risk (Low < 19, Mod (19-27), High > 27): Readmission Risk Score: 14.3    Current PCP: Nessa Stanley MD    PT AM-PAC: 20 /24  OT AM-PAC: 19 /24    Patient was been educated on the benefits of hhc  as well as the possible risks of declining recommended services but she declined. Advised in case she happens to need the services to contact her PCP to coordinate the services. Patient is agreement. Financial    Payor: [facility] MEDICARE / Plan: Sergio Shah / Product Type: *No Product type* /     Pharmacy:  Potential assistance Purchasing Medications: No  Meds-to-Beds request: Yes      [address] [address] [address]  [address] [address]  Phone: [phone] Fax: [phone]    [address] [address]  Phone: [phone] Fax: [phone]    [address] [place] [address] [address]  [address] [address]  Phone: [phone] Fax: [phone]      Notes: Additional Case Management Notes:   Family will transport patient home. Electronically signed by Star Keane RN on 10/9/2023 . EBL- 2000cc

## 2023-10-09 NOTE — DISCHARGE SUMMARY
Hospital Medicine Discharge Summary    Patient: Radha Macias     Gender: female  : 1955   Age: 77 y.o. MRN: [id number]    Admitting Physician: Flaco Andrew MD  Discharge Physician: Misael Rosen PA-C     Code Status: Full Code     Admit Date: 10/5/2023   Discharge Date:   10/9/23    Disposition:  Home  Time spent arranging discharge: 36 minutes    Discharge Diagnoses: Active Hospital Problems    Diagnosis Date Noted    Pulmonary congestion [R09.89] 10/06/2023    Atrial fibrillation ([address]) [I48.91]     Acute diastolic heart failure with preserved ejection fraction (HCC) [I50.31] 10/05/2023    Class 3 severe obesity due to excess calories with serious comorbidity and body mass index (BMI) of 45.0 to 49.9 in [facility]) [E66.01, Z68.42] 2017       Recommendations: Follow up with Sleep Medicine for sleep study, chf clinic in one week, PCP in one week, EP in one month    Condition at Discharge:  [address] [place] [address],[address] Course:   Patient is a 70-year-old female who presented to hospital with exertional shortness of breath and orthopnea. She has a history of atrial fibrillation and diastolic heart failure. She has been compliant with her medications. In the emergency room chest x-ray revealed pulmonary vascular congestion. Her BNP was elevated at 1288. EKG revealed atrial fibrillation. Patient was admitted for treatment of decompensated CHF and A-fib. Acute on chronic diastolic CHF-patient was admitted and started on IV lasix. She was seen by cardiology. She was started on Jardiance, aldactone, and entresto. Her BNP improved to 337. Her breathing improved and she is feeling much better. She did have an echocardiogram which was a difficult study however ejection fraction appears to be preserved. Diastolic function could not be determined. She will follow-up with the heart failure clinic on outpatient basis. Afib-rate controlled at present time.   Her sotalol was continued

## 2023-10-09 NOTE — PROCEDURES
[facility]     Electrophysiology Procedure Note       Date of Procedure: 10/9/2023  Patient's Name: Geronimo Dunham  YOB: 1955   Medical Record Number: [id number]  Procedure Performed by: Wayne Zepeda MD    Procedures performed:    External Electrical cardioversion   IV sedation. Start time: 10:30 AM  Stop time: 10:35 AM    Medication: Brevital Sodium   Sedation medication was given by physician    An independent trained observer assisted in the monitoring of the patient's level of consciousness and physiological status including vital signs. Indication of the procedure: Persistent atrial fibrillation     Details of procedure: The patient was brought to the cath lab area in a fasting and non-sedated state. The risks, benefits and alternatives of the procedure were discussed with the patient. The patient opted to proceed with the procedure. Written informed consent was signed and placed in the chart. A timeout protocol was completed to identify the patient and the procedure being performed. Then we used brevital for sedation. 70 mg Brevital was given by me as one dose, and electrical DC cardioversion was perfomred using 200J, synchronized shock. Patient was converted to sinus rhythm. The patient tolerated the procedure well and there were no complications.      Conclusion:   Successful external DC cardioversion of atrial fibrillation

## 2023-10-10 LAB
EKG ATRIAL RATE: 73 BPM
EKG DIAGNOSIS: NORMAL
EKG P AXIS: 47 DEGREES
EKG P-R INTERVAL: 160 MS
EKG Q-T INTERVAL: 444 MS
EKG QRS DURATION: 110 MS
EKG QTC CALCULATION (BAZETT): 489 MS
EKG R AXIS: -26 DEGREES
EKG T AXIS: 62 DEGREES
EKG VENTRICULAR RATE: 73 BPM

## 2023-10-10 PROCEDURE — 93010 ELECTROCARDIOGRAM REPORT: CPT | Performed by: INTERNAL MEDICINE

## 2023-10-12 ENCOUNTER — TELEPHONE (OUTPATIENT)
Dept: CARDIOLOGY CLINIC | Age: 68
End: 2023-10-12

## 2023-10-12 ENCOUNTER — TELEPHONE (OUTPATIENT)
Dept: OTHER | Age: 68
End: 2023-10-12

## 2023-10-12 NOTE — TELEPHONE ENCOUNTER
[address] [facility] FAILURE PROGRAM  TELEPHONE ENCOUNTER FORM    Agustina Baldwin 1955    ASSESSMENT:   Baseline weight: 301 lbs  Current weight: 301 lbs  Patient weighing daily: Yes  What are your symptoms of heart failure: dyspnea, edema  Are you having any symptoms:  No  Patient knows who to call with symptoms: Yes  Diet history:      Patient states sodium limitation is : 3000 mg      Patient states fluid limitation is 64 oz  Patient following diet as instructed: Yes  Medication history: taking medications as instructed Yes; medication side effects noted No  Patient is being active at home: Yes  Patient knows date and time of follow up appointment: Yes   Patient has transportation to appointments: Yes    RECOMMENDATIONS:   Medication: taking as prescribed  Diet: no added salt diet  MD/ Clinic appointment: 10/16 with Jami Ulrich NP  Other: Patient doing well. Encouraged her to call with any questions or concerns.

## 2023-10-12 NOTE — TELEPHONE ENCOUNTER
Pt is unable to afford medication Jardiance, Entresto and Eliquis prescribed. Asking if there is assistance for medications. Please call to discuss. 8

## 2023-10-12 NOTE — TELEPHONE ENCOUNTER
Tried to reach patient [facility] about the patient assistance application forms. Asked her to stop by the office to pick them up or we can mail them to her. Asked patient to return our call.  Addressed in [address] today with SMITHA

## 2023-10-13 NOTE — PROGRESS NOTES
[facility]   Congestive Heart Failure    Primary Care Doctor:  Charley Maldonado MD  Primary Cardiologist: melida Garcia pt         Chief Complaint:  fatigue    History of Present Illness:  Denisse Worley is a 77 y.o. female with PMH ELLIE, obesity, HTN, HLD, DM, AS, PAF,  who presents today for hospital f/u. Denisse Worley was admitted 10/5/23 and discharged 10/9/23. Hospital course: presented to hospital with exertional shortness of breath and orthopnea. She has a history of atrial fibrillation and diastolic heart failure. She has been compliant with her medications. In the emergency room chest x-ray revealed pulmonary vascular congestion. Her BNP was elevated at 1288. EKG revealed atrial fibrillation. Patient was admitted for treatment of decompensated CHF and A-fib. Acute on chronic diastolic CHF-patient was admitted and started on IV lasix. She was seen by cardiology. She was started on Jardiance, aldactone, and entresto. Her BNP improved to 337. Her breathing improved and she is feeling much better. She did have an echocardiogram which was a difficult study however ejection fraction appears to be preserved. Diastolic function could not be determined. She will follow-up with the heart failure clinic on outpatient basis. Afib-rate controlled at present time. Her sotalol was continued she was seen by electrophysiology and underwent successful cardioversion on October 9. She will continue her Eliquis. She is considering ablation. Suspected sleep apnea-has outpatient appt with Dr Papi Hwang. Moderate aortic stenosis-repeat echo could not determine degree of stenosis. Since hospitalization she reports dyspnea, fatigue, weakness and denies chest pain, palpitations, orthopnea, PND, exertional chest pressure/discomfort, early saiety, edema, syncope. discharge home weight was 307  Daily wts since that time have decreased.   Today's home wt: 299    F/U Phone Call date:

## 2023-10-16 ENCOUNTER — OFFICE VISIT (OUTPATIENT)
Dept: CARDIOLOGY CLINIC | Age: 68
End: 2023-10-16

## 2023-10-16 VITALS
OXYGEN SATURATION: 96 % | HEART RATE: 86 BPM | HEIGHT: 68 IN | WEIGHT: 293 LBS | DIASTOLIC BLOOD PRESSURE: 80 MMHG | BODY MASS INDEX: 44.41 KG/M2 | SYSTOLIC BLOOD PRESSURE: 110 MMHG

## 2023-10-16 DIAGNOSIS — I48.0 PAF (PAROXYSMAL ATRIAL FIBRILLATION) (HCC): Chronic | ICD-10-CM

## 2023-10-16 DIAGNOSIS — I50.31 ACUTE DIASTOLIC CONGESTIVE HEART FAILURE (HCC): Primary | ICD-10-CM

## 2023-10-16 DIAGNOSIS — I10 HYPERTENSION, UNSPECIFIED TYPE: Chronic | ICD-10-CM

## 2023-10-16 DIAGNOSIS — I35.0 AORTIC VALVE STENOSIS, ETIOLOGY OF CARDIAC VALVE DISEASE UNSPECIFIED: ICD-10-CM

## 2023-10-16 DIAGNOSIS — Z09 HOSPITAL DISCHARGE FOLLOW-UP: ICD-10-CM

## 2023-10-16 RX ORDER — FUROSEMIDE 40 MG/1
20 TABLET ORAL DAILY
Qty: 60 TABLET | Refills: 0 | Status: SHIPPED | OUTPATIENT
Start: 2023-10-16

## 2023-10-16 ASSESSMENT — ENCOUNTER SYMPTOMS
SHORTNESS OF BREATH: 1
GASTROINTESTINAL NEGATIVE: 1

## 2023-10-18 ENCOUNTER — TELEPHONE (OUTPATIENT)
Dept: CARDIOLOGY CLINIC | Age: 68
End: 2023-10-18

## 2023-10-18 NOTE — TELEPHONE ENCOUNTER
Pt called  stating they are trying to get their RX's through [facility], the rx for the Jardiance is 10 MG, [facility] only has 25 mg pt would like to know how they would handle that? Pls advise, pt is having trouble with phone today 10/18 due to spectrum being down, if there is not answer it would like to know if the office can call back 10/19 or 10/20.     Pls advise thank you

## 2023-10-19 ENCOUNTER — TELEPHONE (OUTPATIENT)
Dept: CARDIOLOGY CLINIC | Age: 68
End: 2023-10-19

## 2023-10-19 NOTE — TELEPHONE ENCOUNTER
Medication Refill    Medication needing refilled:  empagliflozin (JARDIANCE)    Dosage of the medication:  10 MG     How are you taking this medication (QD, BID, TID, QID, PRN):    30 or 90 day supply called in:  30  When will you run out of your medication:    Which Pharmacy are we sending the medication to?:    [facility], [address] [facility],[address] JACOBO/Harinder Warren   [address] [place] [address] [facility] [address]   Phone:  [phone]  Fax:  [phone]          Medication Refill    Medication needing refilled:  sacubitril-valsartan (ENTRESTO    Dosage of the medication:  24-26 MG     How are you taking this medication (QD, BID, TID, QID, PRN):    30 or 90 day supply called in:  30  When will you run out of your medication:    Which Pharmacy are we sending the medication to?:  [address] [place] [address], /Harinder Warren   [address] [place] [address] [facility] [address]   Phone:  [phone]  Fax:  [phone]      Medication Refill    Medication needing refilled:  apixaban (ELIQUIS    Dosage of the medication:  5 MG TABS     How are you taking this medication (QD, BID, TID, QID, PRN):    30 or 90 day supply called in:  90  When will you run out of your medication:    Which Pharmacy are we sending the medication to?:    [address] [place] [address], C/Harinder Warren   [address] [place] [address] [address] [facility] [address]   Phone:  [phone]  Fax:  [phone]

## 2023-10-19 NOTE — TELEPHONE ENCOUNTER
Cory Saldivar from [employer], Phone:  [phone]. Cory Saldivar is requesting a letter on [facility] letterhead, the Pt diagnoses, any recent hospitalization. Pamela Valdez was released from. the Hospital  and can not come to [address] [place] [address] for a face to face interview, for the [facility].  Asking for a phone interview for the appt. Would be the body of the letter. [address] [facility]  Phone:  [phone]    Javi Barragan  Attn: Niya Mera  Fax:  [phone]   Fax the Letter to Javi Barragan    NOTE:  Cory Saldivar is requesting for Willaim Favre to call her to discuss the Pt med.

## 2023-10-20 NOTE — TELEPHONE ENCOUNTER
Prescription refill    Last OV:seen in the hospital    Last Refill: [facility]    Labs:10/09/2023    Future Appt: 01/03/2024

## 2023-10-23 ENCOUNTER — TELEPHONE (OUTPATIENT)
Dept: CARDIOLOGY CLINIC | Age: 68
End: 2023-10-23

## 2023-10-23 NOTE — TELEPHONE ENCOUNTER
Pt is scheduled to get her flu and COVID shots on the same day and is wondering if that is ok with her CHF    Please call to discuss

## 2023-10-24 ENCOUNTER — TELEPHONE (OUTPATIENT)
Dept: CARDIOLOGY CLINIC | Age: 68
End: 2023-10-24

## 2023-10-24 NOTE — TELEPHONE ENCOUNTER
Tried to reach patient [facility] that her results are not in yet and to give it 1 more week and that the [facility] [place] office will be calling her with the results.

## 2023-10-24 NOTE — TELEPHONE ENCOUNTER
Pt had two week monitor done in [address] [place] [address] and is asking if [facility] has the results.   Please call to discuss

## 2023-10-26 DIAGNOSIS — I48.0 PAF (PAROXYSMAL ATRIAL FIBRILLATION) (HCC): Chronic | ICD-10-CM

## 2023-10-26 DIAGNOSIS — I48.91 ATRIAL FIBRILLATION, UNSPECIFIED TYPE (HCC): Primary | ICD-10-CM

## 2023-10-26 PROCEDURE — 93248 EXT ECG>7D<15D REV&INTERPJ: CPT | Performed by: INTERNAL MEDICINE

## 2023-10-27 ENCOUNTER — TELEPHONE (OUTPATIENT)
Dept: CARDIOLOGY CLINIC | Age: 68
End: 2023-10-27

## 2023-10-27 ENCOUNTER — APPOINTMENT (OUTPATIENT)
Dept: GENERAL RADIOLOGY | Age: 68
End: 2023-10-27
Payer: MEDICARE

## 2023-10-27 ENCOUNTER — HOSPITAL ENCOUNTER (EMERGENCY)
Age: 68
Discharge: HOME | End: 2023-10-27
Attending: STUDENT IN AN ORGANIZED HEALTH CARE EDUCATION/TRAINING PROGRAM
Payer: MEDICARE

## 2023-10-27 VITALS
HEART RATE: 71 BPM | BODY MASS INDEX: 45.99 KG/M2 | OXYGEN SATURATION: 97 % | DIASTOLIC BLOOD PRESSURE: 68 MMHG | TEMPERATURE: 97.5 F | SYSTOLIC BLOOD PRESSURE: 153 MMHG | HEIGHT: 67 IN | WEIGHT: 293 LBS | RESPIRATION RATE: 13 BRPM

## 2023-10-27 DIAGNOSIS — R94.31 ACUTE ELECTROCARDIOGRAM CHANGES: Primary | ICD-10-CM

## 2023-10-27 LAB
ANION GAP SERPL CALCULATED.3IONS-SCNC: 15 MMOL/L (ref 3–16)
BASOPHILS # BLD: 0.1 K/UL (ref 0–0.2)
BASOPHILS NFR BLD: 0.7 %
BUN SERPL-MCNC: 19 MG/DL (ref 7–20)
CALCIUM SERPL-MCNC: 9.3 MG/DL (ref 8.3–10.6)
CHLORIDE SERPL-SCNC: 97 MMOL/L (ref 99–110)
CO2 SERPL-SCNC: 19 MMOL/L (ref 21–32)
CREAT SERPL-MCNC: 0.9 MG/DL (ref 0.6–1.2)
DEPRECATED RDW RBC AUTO: 14.7 % (ref 12.4–15.4)
EOSINOPHIL # BLD: 0.2 K/UL (ref 0–0.6)
EOSINOPHIL NFR BLD: 2.6 %
GFR SERPLBLD CREATININE-BSD FMLA CKD-EPI: >60 ML/MIN/{1.73_M2}
GLUCOSE SERPL-MCNC: 122 MG/DL (ref 70–99)
HCT VFR BLD AUTO: 45.9 % (ref 36–48)
HGB BLD-MCNC: 14.8 G/DL (ref 12–16)
LYMPHOCYTES # BLD: 2.2 K/UL (ref 1–5.1)
LYMPHOCYTES NFR BLD: 24.7 %
MCH RBC QN AUTO: 26.5 PG (ref 26–34)
MCHC RBC AUTO-ENTMCNC: 32.3 G/DL (ref 31–36)
MCV RBC AUTO: 82 FL (ref 80–100)
MONOCYTES # BLD: 0.6 K/UL (ref 0–1.3)
MONOCYTES NFR BLD: 7.3 %
NEUTROPHILS # BLD: 5.7 K/UL (ref 1.7–7.7)
NEUTROPHILS NFR BLD: 64.7 %
NT-PROBNP SERPL-MCNC: 212 PG/ML (ref 0–124)
PLATELET # BLD AUTO: 269 K/UL (ref 135–450)
PMV BLD AUTO: 8.4 FL (ref 5–10.5)
POTASSIUM SERPL-SCNC: 3.7 MMOL/L (ref 3.5–5.1)
RBC # BLD AUTO: 5.6 M/UL (ref 4–5.2)
SODIUM SERPL-SCNC: 131 MMOL/L (ref 136–145)
TROPONIN, HIGH SENSITIVITY: 11 NG/L (ref 0–14)
TROPONIN, HIGH SENSITIVITY: 12 NG/L (ref 0–14)
WBC # BLD AUTO: 8.9 K/UL (ref 4–11)

## 2023-10-27 PROCEDURE — 93005 ELECTROCARDIOGRAM TRACING: CPT | Performed by: STUDENT IN AN ORGANIZED HEALTH CARE EDUCATION/TRAINING PROGRAM

## 2023-10-27 PROCEDURE — 80048 BASIC METABOLIC PNL TOTAL CA: CPT

## 2023-10-27 PROCEDURE — 83880 ASSAY OF NATRIURETIC PEPTIDE: CPT

## 2023-10-27 PROCEDURE — 84132 ASSAY OF SERUM POTASSIUM: CPT

## 2023-10-27 PROCEDURE — 71045 X-RAY EXAM CHEST 1 VIEW: CPT

## 2023-10-27 PROCEDURE — 84484 ASSAY OF TROPONIN QUANT: CPT

## 2023-10-27 PROCEDURE — 99284 EMERGENCY DEPT VISIT MOD MDM: CPT

## 2023-10-27 PROCEDURE — 85025 COMPLETE CBC W/AUTO DIFF WBC: CPT

## 2023-10-27 ASSESSMENT — ENCOUNTER SYMPTOMS
CONSTIPATION: 0
BACK PAIN: 0
VOMITING: 0
ALLERGIC/IMMUNOLOGIC NEGATIVE: 1
ABDOMINAL DISTENTION: 0
EYE PAIN: 0
WHEEZING: 0
CHEST TIGHTNESS: 0
ABDOMINAL PAIN: 0
NAUSEA: 0
SHORTNESS OF BREATH: 0
PHOTOPHOBIA: 0
DIARRHEA: 0
COUGH: 0

## 2023-10-27 ASSESSMENT — PAIN SCALES - GENERAL: PAINLEVEL_OUTOF10: 0

## 2023-10-27 ASSESSMENT — LIFESTYLE VARIABLES
HOW MANY STANDARD DRINKS CONTAINING ALCOHOL DO YOU HAVE ON A TYPICAL DAY: PATIENT DOES NOT DRINK
HOW OFTEN DO YOU HAVE A DRINK CONTAINING ALCOHOL: NEVER

## 2023-10-27 NOTE — ED NOTES
Pt discharged to home, alert and oriented. Denies any questions about discharge instructions. Will follow up as directed. encouraged to return for any worsening symptoms.         Rosie Faulkner RN  10/27/23 Seble Paez

## 2023-10-27 NOTE — ED PROVIDER NOTES
ED Attending Attestation Note     Date of evaluation: 10/27/2023    This patient was seen by the advance practice provider. I have seen and examined the patient, agree with the workup, evaluation, management and diagnosis. The care plan has been discussed. I have reviewed the ECG and concur with the NETO's interpretation. Briefly, this is a patient with pertinent past medical history of A-fib on Eliquis, hypothyroidism, diabetes, CHF, hypertension, hyperlipidemia who presented from her clinic appointment via EMS with concerns for abnormal EKG. The patient is asymptomatic and was there for routine follow-up. The initial report was new left bundle branch block however, it appears to be a new incomplete right bundle branch block and comparing it to prior, is not significantly different. Patient's cardiac work-up here is reassuring. Although she certainly has significant risk factors, given her relative lack of symptoms and grossly unchanged EKG, I do not feel that she requires admission or ED observation stay for cardiac restratification and instead encouraged her to get a routine stress test at her earliest convenience.       Janel Vaughan MD  10/29/23 9034

## 2023-10-27 NOTE — TELEPHONE ENCOUNTER
Received call from Nader Frost with Dr. Asad Santana office to inform Ernst Calvin that the pt was seen in office and she was sent to [facility] at the visit for chest pain and new LBBB seen on ECG today.    Will route as FYI  Thanks

## 2023-10-28 LAB
EKG ATRIAL RATE: 71 BPM
EKG DIAGNOSIS: NORMAL
EKG P AXIS: -8 DEGREES
EKG P-R INTERVAL: 112 MS
EKG Q-T INTERVAL: 420 MS
EKG QRS DURATION: 112 MS
EKG QTC CALCULATION (BAZETT): 456 MS
EKG R AXIS: -27 DEGREES
EKG T AXIS: 97 DEGREES
EKG VENTRICULAR RATE: 71 BPM

## 2023-11-07 ENCOUNTER — TELEPHONE (OUTPATIENT)
Dept: CARDIOLOGY CLINIC | Age: 68
End: 2023-11-07

## 2023-11-07 RX ORDER — SPIRONOLACTONE 25 MG/1
25 TABLET ORAL DAILY
Qty: 90 TABLET | Refills: 0 | Status: SHIPPED | OUTPATIENT
Start: 2023-11-07

## 2023-11-07 NOTE — TELEPHONE ENCOUNTER
Patient has 21 days of jardiance 10 mg left. Patient is asking if its ok to start Jardiance 25 mg since this is what the [facility] pharmacy has in [address] [place] [address]. Patient spoke to her PCP about switching they will be stopping the metformin once a day if it is ok that the patient switches to jardiance 25 ng daily. Please advise     Tried to reach patient LMOM that her Jardiance 25 mg will be managed by PCP for diabetic management. Asked her to call with any questions or concerns.

## 2023-11-07 NOTE — TELEPHONE ENCOUNTER
Medication Refill    Medication needing refilled:  Medication  spironolactone (ALDACTONE) 25 MG tablet [7437]  spironolactone (ALDACTONE) 25 MG tablet       Dosage of the medication:  25mg    How are you taking this medication (QD, BID, TID, QID, PRN):  1x day    30 or 90 day supply called in:  90    When will you run out of your medication:    Which Pharmacy are we sending the medication to?:  [facility], [address] [place] [address],[address]/Harinder Warren   [address] [place] [phone]

## 2023-11-14 NOTE — TELEPHONE ENCOUNTER
Jaime Wallace called to ask Darrel Sam would it be ok for the Pt to have Jardiance 25mg instead of 10mg as they do not carry the 10mg. Please call Jaime Wallace at [phone] to discuss.   Thank you

## 2023-11-14 NOTE — TELEPHONE ENCOUNTER
Medication Refill    Medication needing refilled:  Entresto  Dosage of the medication:  24-26mg    How are you taking this medication (QD, BID, TID, QID, PRN):  Take 1 tablet by mouth 2 times daily     30 or 90 day supply called in:  60    When will you run out of your medication:    Which Pharmacy are we sending the medication to?:    [facility]   Fax:  [phone]      NOTE:  Gracie Butler is requesting 3 refills and a call back to discuss the Pt meds. Please advise.   Thank you

## 2023-11-14 NOTE — TELEPHONE ENCOUNTER
Spoke to Yuriy Perez she is asking for refills for the patient. They will contact the pCP to see if they will prescribed Jardiance 25 mg QD    LMOM for hortencia about getting Jardiance 25 mg from PCP office. Yuriy Perez stated she has the Jardiance 10 mg QD in stock and can give patient another days of medication if we send in a new prescription.

## 2023-12-04 PROBLEM — R09.89 PULMONARY CONGESTION: Status: RESOLVED | Noted: 2023-10-06 | Resolved: 2023-12-04

## 2023-12-04 PROBLEM — R07.9 CHEST PAIN: Status: RESOLVED | Noted: 2022-06-03 | Resolved: 2023-12-04

## 2023-12-06 RX ORDER — SACUBITRIL AND VALSARTAN 24; 26 MG/1; MG/1
1 TABLET, FILM COATED ORAL 2 TIMES DAILY
Qty: 60 TABLET | Refills: 0 | Status: SHIPPED | OUTPATIENT
Start: 2023-12-06

## 2024-01-08 PROBLEM — I50.32 CHRONIC DIASTOLIC (CONGESTIVE) HEART FAILURE (HCC): Status: ACTIVE | Noted: 2024-01-08

## 2024-01-08 PROBLEM — I50.31 ACUTE DIASTOLIC HEART FAILURE WITH PRESERVED EJECTION FRACTION (HCC): Status: RESOLVED | Noted: 2023-10-05 | Resolved: 2024-01-08

## 2024-01-08 NOTE — PROGRESS NOTES
[facility]   Electrophysiology  CARLITOS Grant  Attending EP: Dr. Taiwo Hernandez    Date: 2/7/2024  I had the privilege of visiting Claire Dong in the office.     Chief Complaint:   Chief Complaint   Patient presents with    Atrial Fibrillation     History of Present Illness: History obtained from patient and medical record.    Claire Dong is 68 y.o. female with a past medical history of HTN, HLD, DM, morbid obesity, ELLIE, Moderate AS, PAF on eliquis, non-obstructive CAD, morbid obesity treated with sotalol. S/p DCCV (10/9/23)    -Interval history: Today, Claire Dong is being seen for routine follow up. She is present in a wheel chair. She is doing pretty well. She remains in sinus rhythm with stable BP. She denies any s/s of low BP. No recurrent AF since her cardioversion last fall. She has trouble obtaining [facility]    Denies having chest pain, palpitations, shortness of breath, orthopnea/PND, cough, or dizziness at the time of this visit.    With regard to medication therapy the patient has been compliant with prescribed regimen. They have tolerated therapy to date.     Allergies:  Allergies   Allergen Reactions    Cephalosporins      Cough and congestion    Dilaudid [Hydromorphone Hcl] Itching     Nervous, unable to sleep    Fortaz [Ceftazidime]      Cough and congestion    Statins Other (See Comments)     Muscle cramping/weakness     Home Medications:  Prior to Visit Medications    Medication Sig Taking? Authorizing Provider   sacubitril-valsartan (ENTRESTO) 24-26 MG per tablet Take 1 tablet by mouth 2 times daily Yes Rita Miller APRN - CNP   spironolactone (ALDACTONE) 25 MG tablet TAKE ONE TABLET BY MOUTH DAILY Yes Rita Miller APRN - CNP   furosemide (LASIX) 40 MG tablet Take 0.5 tablets by mouth daily Yes Rita Miller APRN - CNP   empagliflozin (JARDIANCE) 10 MG tablet Take 1 tablet by mouth daily  Patient taking differently: Take

## 2024-01-09 ENCOUNTER — TELEPHONE (OUTPATIENT)
Dept: CARDIOLOGY CLINIC | Age: 69
End: 2024-01-09

## 2024-01-09 RX ORDER — FUROSEMIDE 40 MG/1
20 TABLET ORAL DAILY
Qty: 60 TABLET | Refills: 0 | Status: SHIPPED | OUTPATIENT
Start: 2024-01-09

## 2024-01-09 RX ORDER — SACUBITRIL AND VALSARTAN 24; 26 MG/1; MG/1
1 TABLET, FILM COATED ORAL 2 TIMES DAILY
Qty: 60 TABLET | Refills: 0 | Status: SHIPPED | OUTPATIENT
Start: 2024-01-09

## 2024-01-09 NOTE — TELEPHONE ENCOUNTER
SS worker for Pt called to get the following Rx' filled for the pt:    furosemide (LASIX) 40 MG tablet   ENTRESTO 24-26 MG per tablet     Rx will need to be sent to:  [facility] [place], [place] - [address] [place] [address] -  [phone] - F [phone]

## 2024-01-10 ENCOUNTER — OFFICE VISIT (OUTPATIENT)
Dept: PULMONOLOGY | Age: 69
End: 2024-01-10
Payer: MEDICARE

## 2024-01-10 VITALS
BODY MASS INDEX: 45.99 KG/M2 | SYSTOLIC BLOOD PRESSURE: 130 MMHG | WEIGHT: 293 LBS | OXYGEN SATURATION: 97 % | HEART RATE: 65 BPM | DIASTOLIC BLOOD PRESSURE: 88 MMHG | HEIGHT: 67 IN

## 2024-01-10 DIAGNOSIS — I48.19 PERSISTENT ATRIAL FIBRILLATION (HCC): ICD-10-CM

## 2024-01-10 DIAGNOSIS — E66.01 MORBID OBESITY WITH BMI OF 45.0-49.9, ADULT (HCC): ICD-10-CM

## 2024-01-10 DIAGNOSIS — I50.32 CHRONIC DIASTOLIC (CONGESTIVE) HEART FAILURE (HCC): ICD-10-CM

## 2024-01-10 DIAGNOSIS — G47.10 HYPERSOMNIA: Primary | ICD-10-CM

## 2024-01-10 DIAGNOSIS — I10 HYPERTENSION, UNSPECIFIED TYPE: Chronic | ICD-10-CM

## 2024-01-10 PROCEDURE — 3079F DIAST BP 80-89 MM HG: CPT | Performed by: STUDENT IN AN ORGANIZED HEALTH CARE EDUCATION/TRAINING PROGRAM

## 2024-01-10 PROCEDURE — 3075F SYST BP GE 130 - 139MM HG: CPT | Performed by: STUDENT IN AN ORGANIZED HEALTH CARE EDUCATION/TRAINING PROGRAM

## 2024-01-10 PROCEDURE — 99204 OFFICE O/P NEW MOD 45 MIN: CPT | Performed by: STUDENT IN AN ORGANIZED HEALTH CARE EDUCATION/TRAINING PROGRAM

## 2024-01-10 ASSESSMENT — SLEEP AND FATIGUE QUESTIONNAIRES
HOW LIKELY ARE YOU TO NOD OFF OR FALL ASLEEP WHILE LYING DOWN TO REST IN THE AFTERNOON WHEN CIRCUMSTANCES PERMIT: 2
HOW LIKELY ARE YOU TO NOD OFF OR FALL ASLEEP WHILE WATCHING TV: 1
HOW LIKELY ARE YOU TO NOD OFF OR FALL ASLEEP WHILE SITTING INACTIVE IN A PUBLIC PLACE: 0
HOW LIKELY ARE YOU TO NOD OFF OR FALL ASLEEP WHILE SITTING AND READING: 1
HOW LIKELY ARE YOU TO NOD OFF OR FALL ASLEEP WHEN YOU ARE A PASSENGER IN A CAR FOR AN HOUR WITHOUT A BREAK: 0
HOW LIKELY ARE YOU TO NOD OFF OR FALL ASLEEP IN A CAR, WHILE STOPPED FOR A FEW MINUTES IN TRAFFIC: 0
HOW LIKELY ARE YOU TO NOD OFF OR FALL ASLEEP WHILE SITTING QUIETLY AFTER LUNCH WITHOUT ALCOHOL: 0
NECK CIRCUMFERENCE (INCHES): 16.5
ESS TOTAL SCORE: 4
HOW LIKELY ARE YOU TO NOD OFF OR FALL ASLEEP WHILE SITTING AND TALKING TO SOMEONE: 0

## 2024-01-10 NOTE — PROGRESS NOTES
history of ELLIE, large neck size. I will order a home sleep test to further evaluate this.    We discussed treatment options and she is willing to consider PAP therapy.  If the study is positive for obstructive sleep apnea, we will therefore proceed with auto CPAP unless in lab PAP titration is indicated based on the sleep study.   She understands that untreated ELLIE is associated with heart failure, atrial fibrillation, stroke, HTN, Alzheimer's and impaired glucose tolerance.    She should avoid respiratory suppressants as these can worsen sleep disordered breathing.    She should never drive if drowsy and should pull over at a safe place if she becomes drowsy while driving. A handout on drowsy driving tips was given to the patient.    Hypertension: controlled. Continue present management.      Overweight/obesity BMI: Weight loss is associated with improvement in sleep disordered breathing. Claire Dong should exercise regularly and watch her diet.    No follow-ups on file. Will schedule patient for follow-up after sleep test.    Felix Malagon MD  Sleep Medicine  4:22 PM    This dictation was generated by voice recognition computer software.  Although all attempts are made to edit the dictation for accuracy, there may be errors in the transcription that are not intended.

## 2024-01-10 NOTE — PATIENT INSTRUCTIONS
notice improvement in sleepiness within the first week or two.    Several second line therapies exist for ELLIE. Behavioral therapy for ELLIE includes weight loss and positional therapy which is avoidance of sleeping on ones back.  Significant improvement in ELLIE can occur with as little as 10% weight loss.     Dental devices that hold the lower jaw or tongue forward during sleep can also relieve ELLIE.  These devices are not always as effective as CPAP but are preferred by some patients.    Surgical procedures are also options.  But it is often hard to predict how effective a surgical treatment will be in reducing or eliminating sleep apnea.    Additional Information  American Academy of Sleep Medicine (www.aasmnet.org)    National Sleep Foundation (www.sleepfoundation.org)    American Sleep Apnea Association (www.sleepapnea.org)    National Heart, Lung, and Blood [place] (www.nhlbi.nih.gov)    UptoDate (www.uptodate.com/patients)  Weight Loss      Weight Loss is an important part of treating obstructive sleep apnea. Being at a healthy weight is improtant to prevent and/or facilitate treatment of chronic conditions such as heart disease and diabetes in addition to obstructive sleep apnea.     This is optimally achieved through a healthy diet and exercise program that can be maintained long term.         Drowsy Driving Tips  '  These suggestions will help prevent you from the risk of drowsy driving.    1.  If you feel tired or drowsy do not drive.  Sleepiness is a major cause of motor vehicle accidents and accounts for 40% of all fatal crashes reported on the [facility].  No matter how much you think you can control sleepiness, you can't.    2. Ensure you follow your doctor's advice about the treatment for your sleep disorder. For example, if you have sleep apnea and use CPAP, ensure you use it fully the night before your trip.    3. Get a good night's sleep before driving.  Do not reduce your sleep time if

## 2024-01-11 ENCOUNTER — TELEPHONE (OUTPATIENT)
Dept: SLEEP CENTER | Age: 69
End: 2024-01-11

## 2024-01-11 NOTE — TELEPHONE ENCOUNTER
Called to schedule an hst per Magan     Left vm for the pt to return my call     [employer] medicare insurance

## 2024-01-30 RX ORDER — SACUBITRIL AND VALSARTAN 24; 26 MG/1; MG/1
TABLET, FILM COATED ORAL
Qty: 60 TABLET | Refills: 0 | Status: SHIPPED | OUTPATIENT
Start: 2024-01-30 | End: 2024-02-01 | Stop reason: SDUPTHER

## 2024-01-31 RX ORDER — SPIRONOLACTONE 25 MG/1
25 TABLET ORAL DAILY
Qty: 90 TABLET | Refills: 0 | Status: SHIPPED | OUTPATIENT
Start: 2024-01-31

## 2024-01-31 NOTE — TELEPHONE ENCOUNTER
Last OV: 11/22/2023  Juan Miller  Last Labs: Bmp-10/27/2023    Next OV: 02/07/2024  Juan Waters  Last Refill: Spironlactone-11/07/2023  Juan Miller

## 2024-02-01 RX ORDER — SACUBITRIL AND VALSARTAN 24; 26 MG/1; MG/1
1 TABLET, FILM COATED ORAL 2 TIMES DAILY
Qty: 60 TABLET | Refills: 0 | Status: SHIPPED | OUTPATIENT
Start: 2024-02-01

## 2024-02-01 NOTE — TELEPHONE ENCOUNTER
[facility] states [facility] does not have entresto right now but hoping to get it in the future. Lori is asking if a script can be sent to [facility] pharmacy  [facility], [place] - [facility] [address] - P [phone] - F [phone]     she can get it there for $45 while she waits for [facility]. Any questions please call Lori.

## 2024-02-07 ENCOUNTER — OFFICE VISIT (OUTPATIENT)
Dept: CARDIOLOGY CLINIC | Age: 69
End: 2024-02-07
Payer: MEDICARE

## 2024-02-07 VITALS
WEIGHT: 293 LBS | BODY MASS INDEX: 44.41 KG/M2 | HEIGHT: 68 IN | HEART RATE: 62 BPM | DIASTOLIC BLOOD PRESSURE: 60 MMHG | SYSTOLIC BLOOD PRESSURE: 92 MMHG | OXYGEN SATURATION: 92 %

## 2024-02-07 DIAGNOSIS — I50.32 CHRONIC DIASTOLIC (CONGESTIVE) HEART FAILURE (HCC): ICD-10-CM

## 2024-02-07 DIAGNOSIS — E66.01 CLASS 3 SEVERE OBESITY DUE TO EXCESS CALORIES WITH SERIOUS COMORBIDITY AND BODY MASS INDEX (BMI) OF 45.0 TO 49.9 IN ADULT (HCC): ICD-10-CM

## 2024-02-07 DIAGNOSIS — I48.0 PAF (PAROXYSMAL ATRIAL FIBRILLATION) (HCC): Chronic | ICD-10-CM

## 2024-02-07 DIAGNOSIS — I35.0 NONRHEUMATIC AORTIC VALVE STENOSIS: ICD-10-CM

## 2024-02-07 DIAGNOSIS — G47.33 OSA (OBSTRUCTIVE SLEEP APNEA): Chronic | ICD-10-CM

## 2024-02-07 DIAGNOSIS — I10 HYPERTENSION, UNSPECIFIED TYPE: Chronic | ICD-10-CM

## 2024-02-07 DIAGNOSIS — I48.19 PERSISTENT ATRIAL FIBRILLATION (HCC): Primary | ICD-10-CM

## 2024-02-07 PROCEDURE — 1090F PRES/ABSN URINE INCON ASSESS: CPT | Performed by: NURSE PRACTITIONER

## 2024-02-07 PROCEDURE — G8400 PT W/DXA NO RESULTS DOC: HCPCS | Performed by: NURSE PRACTITIONER

## 2024-02-07 PROCEDURE — 1123F ACP DISCUSS/DSCN MKR DOCD: CPT | Performed by: NURSE PRACTITIONER

## 2024-02-07 PROCEDURE — 99214 OFFICE O/P EST MOD 30 MIN: CPT | Performed by: NURSE PRACTITIONER

## 2024-02-07 PROCEDURE — 1036F TOBACCO NON-USER: CPT | Performed by: NURSE PRACTITIONER

## 2024-02-07 PROCEDURE — G8484 FLU IMMUNIZE NO ADMIN: HCPCS | Performed by: NURSE PRACTITIONER

## 2024-02-07 PROCEDURE — G8417 CALC BMI ABV UP PARAM F/U: HCPCS | Performed by: NURSE PRACTITIONER

## 2024-02-07 PROCEDURE — 3074F SYST BP LT 130 MM HG: CPT | Performed by: NURSE PRACTITIONER

## 2024-02-07 PROCEDURE — 93000 ELECTROCARDIOGRAM COMPLETE: CPT | Performed by: NURSE PRACTITIONER

## 2024-02-07 PROCEDURE — G8427 DOCREV CUR MEDS BY ELIG CLIN: HCPCS | Performed by: NURSE PRACTITIONER

## 2024-02-07 PROCEDURE — 3017F COLORECTAL CA SCREEN DOC REV: CPT | Performed by: NURSE PRACTITIONER

## 2024-02-07 PROCEDURE — 3078F DIAST BP <80 MM HG: CPT | Performed by: NURSE PRACTITIONER

## 2024-02-07 RX ORDER — SACUBITRIL AND VALSARTAN 24; 26 MG/1; MG/1
1 TABLET, FILM COATED ORAL 2 TIMES DAILY
Qty: 60 TABLET | Refills: 0 | Status: SHIPPED | COMMUNITY
Start: 2024-02-07

## 2024-02-26 ENCOUNTER — TELEPHONE (OUTPATIENT)
Dept: CARDIOLOGY CLINIC | Age: 69
End: 2024-02-26

## 2024-02-26 NOTE — TELEPHONE ENCOUNTER
Lori ernst asking I the office received the fax for the to get the Entresto? Pt received letter stating if they did not get the information from the provider they will denied the ENTRESRO and pt will have to start all over.    Pls advise Lori [phone]    Thank you

## 2024-04-29 RX ORDER — SPIRONOLACTONE 25 MG/1
25 TABLET ORAL DAILY
Qty: 30 TABLET | Refills: 0 | Status: SHIPPED | OUTPATIENT
Start: 2024-04-29

## 2024-05-28 ENCOUNTER — HOSPITAL ENCOUNTER (OUTPATIENT)
Age: 69
Discharge: HOME | End: 2024-05-30
Payer: MEDICARE

## 2024-05-28 ENCOUNTER — OFFICE VISIT (OUTPATIENT)
Dept: CARDIOLOGY CLINIC | Age: 69
End: 2024-05-28
Payer: MEDICARE

## 2024-05-28 VITALS
HEIGHT: 68 IN | WEIGHT: 286 LBS | DIASTOLIC BLOOD PRESSURE: 60 MMHG | BODY MASS INDEX: 43.35 KG/M2 | SYSTOLIC BLOOD PRESSURE: 120 MMHG | HEART RATE: 64 BPM | OXYGEN SATURATION: 95 %

## 2024-05-28 VITALS
SYSTOLIC BLOOD PRESSURE: 92 MMHG | WEIGHT: 293 LBS | HEIGHT: 68 IN | BODY MASS INDEX: 44.41 KG/M2 | DIASTOLIC BLOOD PRESSURE: 60 MMHG

## 2024-05-28 DIAGNOSIS — I48.19 PERSISTENT ATRIAL FIBRILLATION (HCC): ICD-10-CM

## 2024-05-28 DIAGNOSIS — I35.0 AORTIC VALVE STENOSIS, ETIOLOGY OF CARDIAC VALVE DISEASE UNSPECIFIED: ICD-10-CM

## 2024-05-28 DIAGNOSIS — I50.32 CHRONIC DIASTOLIC (CONGESTIVE) HEART FAILURE (HCC): Primary | ICD-10-CM

## 2024-05-28 DIAGNOSIS — I10 PRIMARY HYPERTENSION: Chronic | ICD-10-CM

## 2024-05-28 LAB
LEFT VENTRICULAR EJECTION FRACTION MODE: NORMAL
LV EF: 53 %

## 2024-05-28 PROCEDURE — 1090F PRES/ABSN URINE INCON ASSESS: CPT | Performed by: NURSE PRACTITIONER

## 2024-05-28 PROCEDURE — 99214 OFFICE O/P EST MOD 30 MIN: CPT | Performed by: NURSE PRACTITIONER

## 2024-05-28 PROCEDURE — 3078F DIAST BP <80 MM HG: CPT | Performed by: NURSE PRACTITIONER

## 2024-05-28 PROCEDURE — 1123F ACP DISCUSS/DSCN MKR DOCD: CPT | Performed by: NURSE PRACTITIONER

## 2024-05-28 PROCEDURE — G8417 CALC BMI ABV UP PARAM F/U: HCPCS | Performed by: NURSE PRACTITIONER

## 2024-05-28 PROCEDURE — 6360000004 HC RX CONTRAST MEDICATION: Performed by: NURSE PRACTITIONER

## 2024-05-28 PROCEDURE — 3017F COLORECTAL CA SCREEN DOC REV: CPT | Performed by: NURSE PRACTITIONER

## 2024-05-28 PROCEDURE — 1036F TOBACCO NON-USER: CPT | Performed by: NURSE PRACTITIONER

## 2024-05-28 PROCEDURE — C8929 TTE W OR WO FOL WCON,DOPPLER: HCPCS

## 2024-05-28 PROCEDURE — 3074F SYST BP LT 130 MM HG: CPT | Performed by: NURSE PRACTITIONER

## 2024-05-28 PROCEDURE — G8427 DOCREV CUR MEDS BY ELIG CLIN: HCPCS | Performed by: NURSE PRACTITIONER

## 2024-05-28 PROCEDURE — G8400 PT W/DXA NO RESULTS DOC: HCPCS | Performed by: NURSE PRACTITIONER

## 2024-05-28 RX ORDER — EZETIMIBE 10 MG/1
10 TABLET ORAL DAILY
COMMUNITY
Start: 2024-03-11

## 2024-05-28 RX ADMIN — PERFLUTREN 1.5 ML: 6.52 INJECTION, SUSPENSION INTRAVENOUS at 15:06

## 2024-05-28 NOTE — PATIENT INSTRUCTIONS
Instructions:   Medications: no change in meds  Labs: at next office visit  Lifestyle Recommendations: Weigh yourself every day in the morning after urination, call Rita if wt increases 2-3lb in one day or 5lb in one week, Limit sodium to 3000mg/day and fluids to 2L or 64oz/day.   Follow up:4months      [employer] [facility] Resource Line: [phone]

## 2024-05-29 LAB
ECHO AV AREA PEAK VELOCITY: 1 CM2
ECHO AV AREA VTI: 1.2 CM2
ECHO AV AREA/BSA PEAK VELOCITY: 0.4 CM2/M2
ECHO AV AREA/BSA VTI: 0.5 CM2/M2
ECHO AV MEAN GRADIENT: 28 MMHG
ECHO AV MEAN VELOCITY: 2.4 M/S
ECHO AV PEAK GRADIENT: 53 MMHG
ECHO AV PEAK VELOCITY: 3.6 M/S
ECHO AV VELOCITY RATIO: 0.31
ECHO AV VTI: 68 CM
ECHO BSA: 2.49 M2
ECHO LA AREA 2C: 23.4 CM2
ECHO LA AREA 4C: 23.4 CM2
ECHO LA DIAMETER INDEX: 1.43 CM/M2
ECHO LA DIAMETER: 3.4 CM
ECHO LA MAJOR AXIS: 6.5 CM
ECHO LA MINOR AXIS: 3.4 CM
ECHO LA VOL BP: 66 ML (ref 22–52)
ECHO LA VOL MOD A2C: 66 ML (ref 22–52)
ECHO LA VOL MOD A4C: 60 ML (ref 22–52)
ECHO LA VOL/BSA BIPLANE: 28 ML/M2 (ref 16–34)
ECHO LA VOLUME INDEX MOD A2C: 28 ML/M2 (ref 16–34)
ECHO LA VOLUME INDEX MOD A4C: 25 ML/M2 (ref 16–34)
ECHO LV E' LATERAL VELOCITY: 5 CM/S
ECHO LV E' SEPTAL VELOCITY: 8 CM/S
ECHO LV EDV A4C: 62 ML
ECHO LV EDV INDEX A4C: 26 ML/M2
ECHO LV EJECTION FRACTION A4C: 59 %
ECHO LV ESV A4C: 25 ML
ECHO LV ESV INDEX A4C: 11 ML/M2
ECHO LV FRACTIONAL SHORTENING: 16 % (ref 28–44)
ECHO LV INTERNAL DIMENSION DIASTOLE INDEX: 1.55 CM/M2
ECHO LV INTERNAL DIMENSION DIASTOLIC: 3.7 CM (ref 3.9–5.3)
ECHO LV INTERNAL DIMENSION SYSTOLIC INDEX: 1.3 CM/M2
ECHO LV INTERNAL DIMENSION SYSTOLIC: 3.1 CM
ECHO LV IVSD: 0.7 CM (ref 0.6–0.9)
ECHO LV MASS 2D: 89.5 G (ref 67–162)
ECHO LV MASS INDEX 2D: 37.6 G/M2 (ref 43–95)
ECHO LV POSTERIOR WALL DIASTOLIC: 1 CM (ref 0.6–0.9)
ECHO LV RELATIVE WALL THICKNESS RATIO: 0.54
ECHO LVOT AREA: 3.1 CM2
ECHO LVOT AV VTI INDEX: 0.39
ECHO LVOT DIAM: 2 CM
ECHO LVOT MEAN GRADIENT: 2 MMHG
ECHO LVOT PEAK GRADIENT: 5 MMHG
ECHO LVOT PEAK VELOCITY: 1.1 M/S
ECHO LVOT STROKE VOLUME INDEX: 35.1 ML/M2
ECHO LVOT SV: 83.5 ML
ECHO LVOT VTI: 26.6 CM
ECHO MV A VELOCITY: 1.2 M/S
ECHO MV AREA PHT: 2.3 CM2
ECHO MV AREA VTI: 2.4 CM2
ECHO MV E DECELERATION TIME (DT): 327 MS
ECHO MV E VELOCITY: 0.8 M/S
ECHO MV E/A RATIO: 0.67
ECHO MV E/E' LATERAL: 16
ECHO MV E/E' RATIO (AVERAGED): 13
ECHO MV E/E' SEPTAL: 10
ECHO MV LVOT VTI INDEX: 1.33
ECHO MV MAX VELOCITY: 1.2 M/S
ECHO MV MEAN GRADIENT: 6 MMHG
ECHO MV MEAN VELOCITY: 0.6 M/S
ECHO MV PEAK GRADIENT: 6 MMHG
ECHO MV PRESSURE HALF TIME (PHT): 96 MS
ECHO MV VTI: 35.5 CM
ECHO PV MAX VELOCITY: 1.6 M/S
ECHO PV PEAK GRADIENT: 10 MMHG
ECHO RA AREA 4C: 12.9 CM2
ECHO RA END SYSTOLIC VOLUME APICAL 4 CHAMBER INDEX BSA: 14 ML/M2
ECHO RA VOLUME: 33 ML
ECHO RV BASAL DIMENSION: 3.2 CM
ECHO RV FREE WALL PEAK S': 10 CM/S
ECHO RV LONGITUDINAL DIMENSION: 8 CM
ECHO RV MID DIMENSION: 2.6 CM
ECHO RV TAPSE: 2 CM (ref 1.7–?)
ECHO TV REGURGITANT MAX VELOCITY: 2.1 M/S
ECHO TV REGURGITANT PEAK GRADIENT: 18 MMHG

## 2024-05-30 ENCOUNTER — TELEPHONE (OUTPATIENT)
Dept: CARDIOLOGY CLINIC | Age: 69
End: 2024-05-30

## 2024-05-30 NOTE — TELEPHONE ENCOUNTER
----- Message from AQUILES Grant CNP sent at 5/29/2024  4:17 PM EDT -----  Moderate to severe AS. Will need echo every 6 months to monitor

## 2024-05-30 NOTE — TELEPHONE ENCOUNTER
LMOM for pt to call back for message from  [facility]. Unable to leave results message per HIPAA.

## 2024-05-31 ENCOUNTER — TELEPHONE (OUTPATIENT)
Dept: CARDIOLOGY CLINIC | Age: 69
End: 2024-05-31

## 2024-05-31 NOTE — TELEPHONE ENCOUNTER
Last OV note furosemide 20 mg daily and no changes in medication. Pended new Rx to send to new pharmacy below.

## 2024-05-31 NOTE — TELEPHONE ENCOUNTER
Pato with [facility] states pt called asking for refill on Lasix for 20 mg. Original Rx was for 40 mg. She is asking we talk to pt and advise them what dosage she is on or if it is to be 20 mg please send in new Rx. Pato number is [phone]. Please advise. Thank you.

## 2024-06-03 RX ORDER — FUROSEMIDE 40 MG/1
20 TABLET ORAL DAILY
Qty: 60 TABLET | Refills: 0 | Status: SHIPPED | OUTPATIENT
Start: 2024-06-03 | End: 2024-06-03 | Stop reason: SDUPTHER

## 2024-06-03 RX ORDER — FUROSEMIDE 40 MG/1
20 TABLET ORAL DAILY
Qty: 60 TABLET | Refills: 0 | OUTPATIENT
Start: 2024-06-03

## 2024-06-03 RX ORDER — FUROSEMIDE 20 MG/1
20 TABLET ORAL DAILY
Qty: 90 TABLET | Refills: 0 | Status: SHIPPED | OUTPATIENT
Start: 2024-06-03

## 2024-06-03 NOTE — TELEPHONE ENCOUNTER
Medication Refill    Medication needing refilled:  furosemide     Dosage of the medication:  20mg    How are you taking this medication (QD, BID, TID, QID, PRN):  Take 0.5 tablets by mouth daily     30 or 90 day supply called in: 30    When will you run out of your medication:    Which Pharmacy are we sending the medication to?:    [facility]  [address]   Phone:  [phone]   Fax:  [phone]   [facility] [address]     NOTE:  Per Pt she is wanting the 20mg instead of the 40mg so she wont' have to cut them.  Thank you

## 2024-06-06 RX ORDER — SPIRONOLACTONE 25 MG/1
25 TABLET ORAL DAILY
Qty: 30 TABLET | Refills: 0 | Status: SHIPPED | OUTPATIENT
Start: 2024-06-06

## 2024-06-25 ENCOUNTER — TELEPHONE (OUTPATIENT)
Dept: CARDIOLOGY CLINIC | Age: 69
End: 2024-06-25

## 2024-06-25 NOTE — TELEPHONE ENCOUNTER
Pt called to inform NPKV that she had an episode like having a-fib, sob, bp low it lasted about 1/2 hr.  Please advise.  Thank you

## 2024-06-25 NOTE — TELEPHONE ENCOUNTER
If she feels like she is back in sinus. Will continue to monitor. If recurrence, should contact the office and come in for an ECG.   What are her  BP readings ?

## 2024-06-25 NOTE — TELEPHONE ENCOUNTER
Called pt.back she stated that she doesn't have any symptoms right now her b/p was 110/70 also advise pt.to call office for EKG if she has any symptoms. Pt.verbalized understanding

## 2024-06-25 NOTE — TELEPHONE ENCOUNTER
Spoke to patient she had the afib episode last night 8-9 pm, today she is fine, last night her heart rate was normal and BP was low and she couldn't breath  She has these episodes 2 to 3 times a year but its very scary for her. Taking medications as prescribed. And on time has not missed any doses.  What do you recommend when this happens. She wants to know who would be her main cardiologist?

## 2024-07-03 ENCOUNTER — TELEPHONE (OUTPATIENT)
Dept: CARDIOLOGY CLINIC | Age: 69
End: 2024-07-03

## 2024-07-03 RX ORDER — AMLODIPINE BESYLATE 5 MG/1
5 TABLET ORAL DAILY
Qty: 90 TABLET | Refills: 3 | OUTPATIENT
Start: 2024-07-03

## 2024-07-03 NOTE — TELEPHONE ENCOUNTER
Medication Refill    Medication needing refilled:  amLODIPine (NORVASC)   Dosage of the medication:  5 MG tablet   How are you taking this medication (QD, BID, TID, QID, PRN):    30 or 90 day supply called in:    When will you run out of your medication:    Which Pharmacy are we sending the medication to?:  [facility] - [facility] [place], [place] - [address] - P [phone] - F [phone]  [address] [address]  Phone: [phone]  Fax: [phone]           Pharmacy calling requesting clarification if pt is still needing to be taking this   Please advise   Thank you

## 2024-07-05 ENCOUNTER — TELEPHONE (OUTPATIENT)
Dept: CARDIOLOGY CLINIC | Age: 69
End: 2024-07-05

## 2024-07-05 RX ORDER — SPIRONOLACTONE 25 MG/1
25 TABLET ORAL DAILY
Qty: 30 TABLET | Refills: 0 | Status: SHIPPED | OUTPATIENT
Start: 2024-07-05

## 2024-07-05 RX ORDER — SPIRONOLACTONE 25 MG/1
25 TABLET ORAL DAILY
Qty: 30 TABLET | Refills: 0 | OUTPATIENT
Start: 2024-07-05

## 2024-07-05 RX ORDER — AMLODIPINE BESYLATE 5 MG/1
5 TABLET ORAL DAILY
Qty: 90 TABLET | Refills: 3 | OUTPATIENT
Start: 2024-07-05

## 2024-07-05 NOTE — TELEPHONE ENCOUNTER
[facility] called to clear up the confusion.  Pt is not wanting amlodipine she needs a refill for spironolactone.      Medication Refill    Medication needing refilled:  spironolactone     Dosage of the medication:  25mg    How are you taking this medication (QD, BID, TID, QID, PRN):  TAKE ONE TABLET BY MOUTH DAILY     30 or 90 day supply called in:  90    When will you run out of your medication:    Which Pharmacy are we sending the medication to?:    [facility] - [address]  [facility] [address]  Phone: [phone]  Fax: [phone]

## 2024-07-05 NOTE — TELEPHONE ENCOUNTER
Pt called to ask the office is she still suppose to take amlodipine.  Per Pt she has been taking it since she came [facility] in [facility].  Pt call to discuss this matter.  Thank you

## 2024-07-08 PROBLEM — I48.91 ATRIAL FIBRILLATION (HCC): Status: RESOLVED | Noted: 2023-10-06 | Resolved: 2024-07-08

## 2024-07-08 NOTE — PROGRESS NOTES
BB, ARNI, spironolactone, Jardiance, lasix  - Aggressive medical therapy with risk factor modification  - Discussed with patient importance of monitoring weight, low sodium diet and fluid restriction     3. HTN  - Stable  ~ Goal <130/80  - Continue current medications     4. Aortic Stenosis              - Moderate to severe per echo (5/24)              - Routine echoes to assess. Will refer to Dr. Henry for management     5. ELLIE  - Non-compliant, not using CPAP at night  - Encourage to use CPAP to prevent long term effects of untreated ELLIE     6.Obesity  - Body mass index is 42.89 kg/m².   - Complicating assessment and treatment. Placing patient at risk for multiple co-morbidities as well as early death and contributing to the patient's presentation  - Discussed weight loss and patient was encouraged to reduce calorie intake and increase exercise    Plan:  1.Refer to Dr. Henry for aortic valve stenosis    F/U: Follow-up with Dr. Maravilla in 8 months  -Call [facility] at [phone] with any questions    Diet & Exercise:  The patient is counseled to follow a low salt diet to assure blood pressure remains controlled for cardiovascular risk factor modification  The patient is counseled to avoid excess caffeine, and energy drinks as this may exacerbated ectopy and arrhythmia  The patient is counseled to lose weight to control cardiovascular risk factors  Exercise program discussed: To improve overall cardiovascular health, the patient is instructed to increase cardiovascular related activities with a goal of 150 min/week of moderate level activity or 10,000 steps per day. Encouraged to perform as much activity as tolerated    I have addressed the patient's cardiac risk factors and adjusted pharmacologic treatment as needed. In addition, I have reinforced the need for patient directed risk factor modification.    I independently reviewed the ECG    All questions and concerns were addressed with the patient.

## 2024-07-17 RX ORDER — SACUBITRIL AND VALSARTAN 24; 26 MG/1; MG/1
1 TABLET, FILM COATED ORAL 2 TIMES DAILY
Qty: 60 TABLET | Refills: 0 | Status: SHIPPED | OUTPATIENT
Start: 2024-07-17

## 2024-07-17 RX ORDER — SPIRONOLACTONE 25 MG/1
25 TABLET ORAL DAILY
Qty: 30 TABLET | Refills: 0 | OUTPATIENT
Start: 2024-07-17

## 2024-07-17 NOTE — TELEPHONE ENCOUNTER
Received refill request for ENTRESTO 24-26 MG  from [facility] pharmacy.     Last OV: 5/28/2024 NPKV    Next OV: 8/7/2024 NPSR    Last Labs:     Last Filled: 2/7/2024 NPSR

## 2024-07-24 RX ORDER — SPIRONOLACTONE 25 MG/1
25 TABLET ORAL DAILY
Qty: 30 TABLET | Refills: 0 | Status: SHIPPED | OUTPATIENT
Start: 2024-07-24

## 2024-08-02 RX ORDER — SPIRONOLACTONE 25 MG/1
25 TABLET ORAL DAILY
Qty: 30 TABLET | Refills: 0 | OUTPATIENT
Start: 2024-08-02

## 2024-08-02 NOTE — TELEPHONE ENCOUNTER
[facility] states pt is not able to get transportation to [facility] pharmacy and asking if script could be sent to them and they will delivery to pt's home. States pt has enough medication thru Sunday.

## 2024-08-05 RX ORDER — SPIRONOLACTONE 25 MG/1
25 TABLET ORAL DAILY
Qty: 90 TABLET | Refills: 0 | Status: SHIPPED | OUTPATIENT
Start: 2024-08-05

## 2024-08-05 NOTE — TELEPHONE ENCOUNTER
Patient is not using [facility] and is switching to [facility] need to send in a new [facility].

## 2024-08-07 ENCOUNTER — OFFICE VISIT (OUTPATIENT)
Dept: CARDIOLOGY CLINIC | Age: 69
End: 2024-08-07
Payer: MEDICARE

## 2024-08-07 VITALS
HEART RATE: 66 BPM | SYSTOLIC BLOOD PRESSURE: 128 MMHG | BODY MASS INDEX: 42.74 KG/M2 | HEIGHT: 68 IN | DIASTOLIC BLOOD PRESSURE: 72 MMHG | WEIGHT: 282 LBS | OXYGEN SATURATION: 96 %

## 2024-08-07 DIAGNOSIS — I50.32 CHRONIC DIASTOLIC (CONGESTIVE) HEART FAILURE (HCC): ICD-10-CM

## 2024-08-07 DIAGNOSIS — E66.01 CLASS 3 SEVERE OBESITY DUE TO EXCESS CALORIES WITH SERIOUS COMORBIDITY AND BODY MASS INDEX (BMI) OF 45.0 TO 49.9 IN ADULT (HCC): ICD-10-CM

## 2024-08-07 DIAGNOSIS — G47.33 OSA (OBSTRUCTIVE SLEEP APNEA): Chronic | ICD-10-CM

## 2024-08-07 DIAGNOSIS — I10 PRIMARY HYPERTENSION: Chronic | ICD-10-CM

## 2024-08-07 DIAGNOSIS — I48.0 PAF (PAROXYSMAL ATRIAL FIBRILLATION) (HCC): Primary | Chronic | ICD-10-CM

## 2024-08-07 DIAGNOSIS — I35.0 AORTIC VALVE STENOSIS, ETIOLOGY OF CARDIAC VALVE DISEASE UNSPECIFIED: ICD-10-CM

## 2024-08-07 PROCEDURE — 1123F ACP DISCUSS/DSCN MKR DOCD: CPT | Performed by: NURSE PRACTITIONER

## 2024-08-07 PROCEDURE — 93000 ELECTROCARDIOGRAM COMPLETE: CPT | Performed by: NURSE PRACTITIONER

## 2024-08-07 PROCEDURE — 1036F TOBACCO NON-USER: CPT | Performed by: NURSE PRACTITIONER

## 2024-08-07 PROCEDURE — G8417 CALC BMI ABV UP PARAM F/U: HCPCS | Performed by: NURSE PRACTITIONER

## 2024-08-07 PROCEDURE — 3074F SYST BP LT 130 MM HG: CPT | Performed by: NURSE PRACTITIONER

## 2024-08-07 PROCEDURE — 3078F DIAST BP <80 MM HG: CPT | Performed by: NURSE PRACTITIONER

## 2024-08-07 PROCEDURE — 3017F COLORECTAL CA SCREEN DOC REV: CPT | Performed by: NURSE PRACTITIONER

## 2024-08-07 PROCEDURE — 99214 OFFICE O/P EST MOD 30 MIN: CPT | Performed by: NURSE PRACTITIONER

## 2024-08-07 PROCEDURE — G8400 PT W/DXA NO RESULTS DOC: HCPCS | Performed by: NURSE PRACTITIONER

## 2024-08-07 PROCEDURE — G8427 DOCREV CUR MEDS BY ELIG CLIN: HCPCS | Performed by: NURSE PRACTITIONER

## 2024-08-07 PROCEDURE — 1090F PRES/ABSN URINE INCON ASSESS: CPT | Performed by: NURSE PRACTITIONER

## 2024-08-13 ENCOUNTER — TELEPHONE (OUTPATIENT)
Dept: CARDIOLOGY CLINIC | Age: 69
End: 2024-08-13

## 2024-08-13 NOTE — TELEPHONE ENCOUNTER
Yessica, can you schedule pt to see Dr. Henry in TAVR spot (15 mins is fine) on 9/5/24 as a new pt for aortic stenosis? Thanks, Carlos CARRION

## 2024-08-23 NOTE — PROGRESS NOTES
[facility]  H+P  Consult  OP Visit  FU Visit   CC/HPI   CC Followup visit for cardiac conditions detailed in assessment and plan below.   Intervention None   General Doing well.  No new concerns.     Cardiac Sx -CP, -dizziness, -syncope, -orthopnea, -pnd, -fatigue, +SOB, +edema   HISTORY/ALLERGY/ROS   M/S/S/F Hx Reviewed in Epic and updated as appropriate   ALLERG Cephalosporins, Dilaudid [hydromorphone hcl], Fortaz [ceftazidime], and Statins   ROS Full ROS obtained and negative except as mentioned in HPI   MEDICATIONS   Cardiac medications reviewed in Epic during visit with patient.   PHYSICAL EXAM   Vitals /64 (Site: Right Lower Arm, Position: Sitting, Cuff Size: Medium Adult)   Pulse 67   Ht 1.727 m (5' 7.99\")   Wt 128.8 kg (284 lb)   SpO2 94%   BMI 43.19 kg/m²    Gen Alert, coop, no distress Heart  Rrr, 2/6, clear s2   Lung CTA-B, unlabored, no DTP Extrem Edema -Grade 1 (2mm)      COMPLIANCE   Discussed and counseled on diet, exercise, weight loss, smoking, alcohol, drugs.  All questions answered.   CODING   SCI (55805) - 30-39 mins spent reviewing hx/tests/consults, performing exam, counseling/educating, ordering meds/tests/procedures, referring/communicating w/PCP/consultants, documenting in EMR, interpreting results, communicating medical information and plan with family.   SCRIBE ATTESTATION   Nurse I, Maria Isabel Izquierdo RN, am scribing for and in the presence of Matias Henry MD. 8/23/2024 10:04 AM EDT   Doctor Maria Isabel Izquierdo is working as scribe for and in presence of me and may have prepopulated components of note with my historical intellectual property (IP) under my supervision.  Any additions to this IP performed in my presence and at my direction. Content and accuracy of this note reviewed by me (Matias Henry MD).   NEW MEDICATIONS   Pt verbalizes understanding of the need for treatment and education provided at today's visit.  Additional education provided in AVS.   ASSESSMENT AND

## 2024-09-05 ENCOUNTER — OFFICE VISIT (OUTPATIENT)
Dept: CARDIOLOGY CLINIC | Age: 69
End: 2024-09-05
Payer: MEDICARE

## 2024-09-05 VITALS
HEIGHT: 68 IN | BODY MASS INDEX: 43.04 KG/M2 | OXYGEN SATURATION: 94 % | SYSTOLIC BLOOD PRESSURE: 116 MMHG | DIASTOLIC BLOOD PRESSURE: 64 MMHG | WEIGHT: 284 LBS | HEART RATE: 67 BPM

## 2024-09-05 DIAGNOSIS — I10 ESSENTIAL HYPERTENSION: ICD-10-CM

## 2024-09-05 DIAGNOSIS — I48.0 PAF (PAROXYSMAL ATRIAL FIBRILLATION) (HCC): ICD-10-CM

## 2024-09-05 DIAGNOSIS — I35.0 AORTIC VALVE STENOSIS, NONRHEUMATIC: Primary | ICD-10-CM

## 2024-09-05 DIAGNOSIS — I25.10 CORONARY ARTERY DISEASE DUE TO LIPID RICH PLAQUE: ICD-10-CM

## 2024-09-05 DIAGNOSIS — I65.23 BILATERAL CAROTID ARTERY STENOSIS: Primary | ICD-10-CM

## 2024-09-05 DIAGNOSIS — I25.83 CORONARY ARTERY DISEASE DUE TO LIPID RICH PLAQUE: ICD-10-CM

## 2024-09-05 PROCEDURE — 3017F COLORECTAL CA SCREEN DOC REV: CPT | Performed by: INTERNAL MEDICINE

## 2024-09-05 PROCEDURE — G8400 PT W/DXA NO RESULTS DOC: HCPCS | Performed by: INTERNAL MEDICINE

## 2024-09-05 PROCEDURE — 1090F PRES/ABSN URINE INCON ASSESS: CPT | Performed by: INTERNAL MEDICINE

## 2024-09-05 PROCEDURE — 3078F DIAST BP <80 MM HG: CPT | Performed by: INTERNAL MEDICINE

## 2024-09-05 PROCEDURE — 3074F SYST BP LT 130 MM HG: CPT | Performed by: INTERNAL MEDICINE

## 2024-09-05 PROCEDURE — G8417 CALC BMI ABV UP PARAM F/U: HCPCS | Performed by: INTERNAL MEDICINE

## 2024-09-05 PROCEDURE — G8427 DOCREV CUR MEDS BY ELIG CLIN: HCPCS | Performed by: INTERNAL MEDICINE

## 2024-09-05 PROCEDURE — 1123F ACP DISCUSS/DSCN MKR DOCD: CPT | Performed by: INTERNAL MEDICINE

## 2024-09-05 PROCEDURE — 99214 OFFICE O/P EST MOD 30 MIN: CPT | Performed by: INTERNAL MEDICINE

## 2024-09-05 PROCEDURE — 1036F TOBACCO NON-USER: CPT | Performed by: INTERNAL MEDICINE

## 2024-09-08 ENCOUNTER — TELEPHONE (OUTPATIENT)
Dept: CARDIOLOGY CLINIC | Age: 69
End: 2024-09-08

## 2024-09-13 ENCOUNTER — TELEPHONE (OUTPATIENT)
Dept: CARDIOLOGY CLINIC | Age: 69
End: 2024-09-13

## 2024-09-19 ASSESSMENT — ENCOUNTER SYMPTOMS: GASTROINTESTINAL NEGATIVE: 1

## 2024-09-20 ENCOUNTER — OFFICE VISIT (OUTPATIENT)
Dept: CARDIOLOGY CLINIC | Age: 69
End: 2024-09-20
Payer: MEDICARE

## 2024-09-20 ENCOUNTER — HOSPITAL ENCOUNTER (OUTPATIENT)
Dept: VASCULAR LAB | Age: 69
Discharge: HOME | End: 2024-09-22
Attending: INTERNAL MEDICINE
Payer: MEDICARE

## 2024-09-20 VITALS
SYSTOLIC BLOOD PRESSURE: 116 MMHG | HEIGHT: 67 IN | OXYGEN SATURATION: 96 % | DIASTOLIC BLOOD PRESSURE: 62 MMHG | BODY MASS INDEX: 43.95 KG/M2 | HEART RATE: 67 BPM | WEIGHT: 280 LBS

## 2024-09-20 DIAGNOSIS — I50.32 CHRONIC DIASTOLIC (CONGESTIVE) HEART FAILURE (HCC): Primary | ICD-10-CM

## 2024-09-20 DIAGNOSIS — I65.23 BILATERAL CAROTID ARTERY STENOSIS: ICD-10-CM

## 2024-09-20 DIAGNOSIS — I35.0 AORTIC VALVE STENOSIS, ETIOLOGY OF CARDIAC VALVE DISEASE UNSPECIFIED: ICD-10-CM

## 2024-09-20 DIAGNOSIS — I10 PRIMARY HYPERTENSION: Chronic | ICD-10-CM

## 2024-09-20 DIAGNOSIS — I48.0 PAF (PAROXYSMAL ATRIAL FIBRILLATION) (HCC): Chronic | ICD-10-CM

## 2024-09-20 LAB
VAS LEFT CCA DIST EDV: 15.8 CM/S
VAS LEFT CCA DIST PSV: 58.8 CM/S
VAS LEFT CCA MID EDV: 16.7 CM/S
VAS LEFT CCA MID PSV: 49.2 CM/S
VAS LEFT CCA PROX EDV: 15.8 CM/S
VAS LEFT CCA PROX PSV: 58 CM/S
VAS LEFT ECA EDV: 9.66 CM/S
VAS LEFT ECA PSV: 55.8 CM/S
VAS LEFT ICA DIST EDV: 28.6 CM/S
VAS LEFT ICA DIST PSV: 75.8 CM/S
VAS LEFT ICA MID EDV: 35.7 CM/S
VAS LEFT ICA MID PSV: 106 CM/S
VAS LEFT ICA PROX EDV: 49.4 CM/S
VAS LEFT ICA PROX PSV: 182 CM/S
VAS LEFT ICA/CCA PSV: 3.7
VAS LEFT SUBCLAVIAN PROX PSV: 119 CM/S
VAS LEFT VERTEBRAL EDV: 11.4 CM/S
VAS LEFT VERTEBRAL PSV: 46.3 CM/S
VAS RIGHT ARM BP: 119 MMHG
VAS RIGHT CCA DIST EDV: 9.1 CM/S
VAS RIGHT CCA DIST PSV: 31.4 CM/S
VAS RIGHT CCA MID EDV: 10.7 CM/S
VAS RIGHT CCA MID PSV: 49.2 CM/S
VAS RIGHT CCA PROX EDV: 16.2 CM/S
VAS RIGHT CCA PROX PSV: 87 CM/S
VAS RIGHT ECA PSV: 218 CM/S
VAS RIGHT ICA DIST EDV: 41.7 CM/S
VAS RIGHT ICA DIST PSV: 132 CM/S
VAS RIGHT ICA MID EDV: 29.6 CM/S
VAS RIGHT ICA MID PSV: 144 CM/S
VAS RIGHT ICA PROX EDV: 55.5 CM/S
VAS RIGHT ICA PROX PSV: 262 CM/S
VAS RIGHT ICA/CCA PSV: 5.33
VAS RIGHT SUBCLAVIAN PROX PSV: 92.6 CM/S
VAS RIGHT VERTEBRAL EDV: 7.84 CM/S
VAS RIGHT VERTEBRAL PSV: 30.6 CM/S

## 2024-09-20 PROCEDURE — 3078F DIAST BP <80 MM HG: CPT | Performed by: NURSE PRACTITIONER

## 2024-09-20 PROCEDURE — G8400 PT W/DXA NO RESULTS DOC: HCPCS | Performed by: NURSE PRACTITIONER

## 2024-09-20 PROCEDURE — 3017F COLORECTAL CA SCREEN DOC REV: CPT | Performed by: NURSE PRACTITIONER

## 2024-09-20 PROCEDURE — G8427 DOCREV CUR MEDS BY ELIG CLIN: HCPCS | Performed by: NURSE PRACTITIONER

## 2024-09-20 PROCEDURE — 1123F ACP DISCUSS/DSCN MKR DOCD: CPT | Performed by: NURSE PRACTITIONER

## 2024-09-20 PROCEDURE — 93880 EXTRACRANIAL BILAT STUDY: CPT

## 2024-09-20 PROCEDURE — 99214 OFFICE O/P EST MOD 30 MIN: CPT | Performed by: NURSE PRACTITIONER

## 2024-09-20 PROCEDURE — 1036F TOBACCO NON-USER: CPT | Performed by: NURSE PRACTITIONER

## 2024-09-20 PROCEDURE — 3074F SYST BP LT 130 MM HG: CPT | Performed by: NURSE PRACTITIONER

## 2024-09-20 PROCEDURE — G8417 CALC BMI ABV UP PARAM F/U: HCPCS | Performed by: NURSE PRACTITIONER

## 2024-09-20 PROCEDURE — 1090F PRES/ABSN URINE INCON ASSESS: CPT | Performed by: NURSE PRACTITIONER

## 2024-09-20 ASSESSMENT — ENCOUNTER SYMPTOMS: RESPIRATORY NEGATIVE: 1

## 2024-09-23 PROCEDURE — 93880 EXTRACRANIAL BILAT STUDY: CPT | Performed by: SURGERY

## 2024-09-25 ENCOUNTER — TELEPHONE (OUTPATIENT)
Dept: CARDIOLOGY CLINIC | Age: 69
End: 2024-09-25

## 2024-09-25 DIAGNOSIS — R94.39 ABNORMAL CAROTID DUPLEX SCAN: Primary | ICD-10-CM

## 2024-10-28 NOTE — PROGRESS NOTES
regimen.  Will plan for repeat duplex surveillance in 6 months.  Sincerely appreciate the referral      Reji Ortega M.D., RODERICK.  10/28/2024  7:30 AM

## 2024-10-29 ENCOUNTER — OFFICE VISIT (OUTPATIENT)
Dept: VASCULAR SURGERY | Age: 69
End: 2024-10-29
Payer: MEDICARE

## 2024-10-29 VITALS
WEIGHT: 281 LBS | SYSTOLIC BLOOD PRESSURE: 130 MMHG | DIASTOLIC BLOOD PRESSURE: 78 MMHG | BODY MASS INDEX: 44.1 KG/M2 | HEIGHT: 67 IN

## 2024-10-29 DIAGNOSIS — I65.23 CAROTID ATHEROSCLEROSIS, BILATERAL: Primary | ICD-10-CM

## 2024-10-29 PROCEDURE — 1090F PRES/ABSN URINE INCON ASSESS: CPT | Performed by: SURGERY

## 2024-10-29 PROCEDURE — 99203 OFFICE O/P NEW LOW 30 MIN: CPT | Performed by: SURGERY

## 2024-10-29 PROCEDURE — G8400 PT W/DXA NO RESULTS DOC: HCPCS | Performed by: SURGERY

## 2024-10-29 PROCEDURE — 1159F MED LIST DOCD IN RCRD: CPT | Performed by: SURGERY

## 2024-10-29 PROCEDURE — G8484 FLU IMMUNIZE NO ADMIN: HCPCS | Performed by: SURGERY

## 2024-10-29 PROCEDURE — 3075F SYST BP GE 130 - 139MM HG: CPT | Performed by: SURGERY

## 2024-10-29 PROCEDURE — G8417 CALC BMI ABV UP PARAM F/U: HCPCS | Performed by: SURGERY

## 2024-10-29 PROCEDURE — 3017F COLORECTAL CA SCREEN DOC REV: CPT | Performed by: SURGERY

## 2024-10-29 PROCEDURE — 1123F ACP DISCUSS/DSCN MKR DOCD: CPT | Performed by: SURGERY

## 2024-10-29 PROCEDURE — G8427 DOCREV CUR MEDS BY ELIG CLIN: HCPCS | Performed by: SURGERY

## 2024-10-29 PROCEDURE — 3078F DIAST BP <80 MM HG: CPT | Performed by: SURGERY

## 2024-10-29 PROCEDURE — 1036F TOBACCO NON-USER: CPT | Performed by: SURGERY

## 2024-10-30 RX ORDER — SPIRONOLACTONE 25 MG/1
25 TABLET ORAL DAILY
Qty: 90 TABLET | Refills: 0 | Status: SHIPPED | OUTPATIENT
Start: 2024-10-30

## 2024-11-18 RX ORDER — SACUBITRIL AND VALSARTAN 24; 26 MG/1; MG/1
1 TABLET, FILM COATED ORAL 2 TIMES DAILY
Qty: 60 TABLET | Refills: 0 | Status: SHIPPED | OUTPATIENT
Start: 2024-11-18 | End: 2024-11-20

## 2024-11-18 NOTE — TELEPHONE ENCOUNTER
Last ov:24 NPKV  Next ov:2025 NPKV  Last EK24  Last labs:10/27/23  Last filled:   Disp Refills Start End    ENTRESTO 24-26 MG per tablet 60 tablet 0 2024 --    Sig - Route: TAKE 1 TABLET BY MOUTH 2 TIMES A DAY - Oral    Sent to pharmacy as: Entresto 24-26 MG Oral Tablet (sacubitril-valsartan)    E-Prescribing Status: Receipt confirmed by pharmacy (2024  3:47 PM EDT)

## 2024-11-18 NOTE — TELEPHONE ENCOUNTER
Received refill request for  ELIQUIS 5 MG TABS tablet  from [facility] pharmacy.     Last OV: 9/20/2024 NPKV    Next OV: 2/21/20250NPKV    Last Labs: 10/27/2023 CBC    Last Filled: 9/17/2024 NPSR

## 2024-11-20 RX ORDER — SACUBITRIL AND VALSARTAN 24; 26 MG/1; MG/1
1 TABLET, FILM COATED ORAL 2 TIMES DAILY
Qty: 60 TABLET | Refills: 3 | Status: SHIPPED | OUTPATIENT
Start: 2024-11-20

## 2024-12-16 ENCOUNTER — TELEPHONE (OUTPATIENT)
Dept: CARDIOLOGY CLINIC | Age: 69
End: 2024-12-16

## 2024-12-16 NOTE — TELEPHONE ENCOUNTER
Medication Samples     Medication:  JARDIANCE      Dosage of the medication:  25mg     How are you taking this medication (QD, BID, TID, QID, PRN):  Take 1 tablet by mouth daily         NOTE: Pt called to ask the office if she could get some samples to hold her over until her shipment arrives.        Please call the Pt so she can pick- up the samples.   Thank you

## 2024-12-16 NOTE — TELEPHONE ENCOUNTER
Medication:Jardiance    Last refill/sample:11/14/2023    Last Visit:09/20/2024    Future Visit:02/21/2025    Labs:12/02/2024    Patient Assistance Started?yes    Samples given?yes    ORDER TASK COMPLETE: YES  (YES/NO)  INITIALS:felipa

## 2024-12-18 RX ORDER — FUROSEMIDE 20 MG/1
20 TABLET ORAL DAILY
Qty: 90 TABLET | Refills: 0 | Status: SHIPPED | OUTPATIENT
Start: 2024-12-18

## 2024-12-18 NOTE — TELEPHONE ENCOUNTER
Received refill request for : furosemide (LASIX) 20 MG tablet  from [facility].     Last OV: 9/20/2024 NPKV    Next OV: 2/21/2025 NPKV    Last Labs: 10/9/2023 BMP    Last Filled: 6/3/2024 NPKV

## 2024-12-24 ENCOUNTER — HOSPITAL ENCOUNTER (OUTPATIENT)
Age: 69
Setting detail: OBSERVATION
Discharge: HOME | End: 2024-12-25
Attending: HOSPITALIST | Admitting: HOSPITALIST
Payer: MEDICARE

## 2024-12-24 ENCOUNTER — TELEPHONE (OUTPATIENT)
Dept: CARDIOLOGY CLINIC | Age: 69
End: 2024-12-24

## 2024-12-24 ENCOUNTER — APPOINTMENT (OUTPATIENT)
Dept: GENERAL RADIOLOGY | Age: 69
End: 2024-12-24
Payer: MEDICARE

## 2024-12-24 DIAGNOSIS — R61 DIAPHORESIS: ICD-10-CM

## 2024-12-24 DIAGNOSIS — R07.89 CHEST TIGHTNESS: Primary | ICD-10-CM

## 2024-12-24 DIAGNOSIS — R00.2 PALPITATIONS: ICD-10-CM

## 2024-12-24 DIAGNOSIS — R06.02 SHORTNESS OF BREATH: ICD-10-CM

## 2024-12-24 DIAGNOSIS — R94.31 ABNORMAL EKG: ICD-10-CM

## 2024-12-24 PROBLEM — R07.9 CHEST PAIN: Status: ACTIVE | Noted: 2024-12-24

## 2024-12-24 LAB
ALBUMIN SERPL-MCNC: 3.7 G/DL (ref 3.4–5)
ALBUMIN/GLOB SERPL: 1.2 {RATIO} (ref 1.1–2.2)
ALP SERPL-CCNC: 48 U/L (ref 40–129)
ALT SERPL-CCNC: 7 U/L (ref 10–40)
ANION GAP SERPL CALCULATED.3IONS-SCNC: 14 MMOL/L (ref 3–16)
APTT BLD: 28.2 SEC (ref 22.1–36.4)
AST SERPL-CCNC: 26 U/L (ref 15–37)
BASOPHILS # BLD: 0.1 K/UL (ref 0–0.2)
BASOPHILS NFR BLD: 1.2 %
BILIRUB SERPL-MCNC: 0.5 MG/DL (ref 0–1)
BILIRUB UR QL STRIP.AUTO: NEGATIVE
BUN SERPL-MCNC: 20 MG/DL (ref 7–20)
CALCIUM SERPL-MCNC: 9.5 MG/DL (ref 8.3–10.6)
CHLORIDE SERPL-SCNC: 103 MMOL/L (ref 99–110)
CLARITY UR: CLEAR
CO2 SERPL-SCNC: 21 MMOL/L (ref 21–32)
COLOR UR: YELLOW
CREAT SERPL-MCNC: 1 MG/DL (ref 0.6–1.2)
DEPRECATED RDW RBC AUTO: 15.7 % (ref 12.4–15.4)
EOSINOPHIL # BLD: 0.2 K/UL (ref 0–0.6)
EOSINOPHIL NFR BLD: 2.1 %
EPI CELLS #/AREA URNS HPF: ABNORMAL /HPF (ref 0–5)
GFR SERPLBLD CREATININE-BSD FMLA CKD-EPI: 60 ML/MIN/{1.73_M2}
GLUCOSE SERPL-MCNC: 112 MG/DL (ref 70–99)
GLUCOSE UR STRIP.AUTO-MCNC: >=1000 MG/DL
HCT VFR BLD AUTO: 47.1 % (ref 36–48)
HGB BLD-MCNC: 15.6 G/DL (ref 12–16)
HGB UR QL STRIP.AUTO: NEGATIVE
INR PPP: 1.22 (ref 0.85–1.15)
KETONES UR STRIP.AUTO-MCNC: ABNORMAL MG/DL
LEUKOCYTE ESTERASE UR QL STRIP.AUTO: ABNORMAL
LYMPHOCYTES # BLD: 2.5 K/UL (ref 1–5.1)
LYMPHOCYTES NFR BLD: 31.7 %
MAGNESIUM SERPL-MCNC: 2.02 MG/DL (ref 1.8–2.4)
MCH RBC QN AUTO: 26.7 PG (ref 26–34)
MCHC RBC AUTO-ENTMCNC: 33 G/DL (ref 31–36)
MCV RBC AUTO: 80.8 FL (ref 80–100)
MONOCYTES # BLD: 0.5 K/UL (ref 0–1.3)
MONOCYTES NFR BLD: 6.1 %
NEUTROPHILS # BLD: 4.7 K/UL (ref 1.7–7.7)
NEUTROPHILS NFR BLD: 58.9 %
NITRITE UR QL STRIP.AUTO: NEGATIVE
NT-PROBNP SERPL-MCNC: 1694 PG/ML (ref 0–124)
PH UR STRIP.AUTO: 5.5 [PH] (ref 5–8)
PLATELET # BLD AUTO: 307 K/UL (ref 135–450)
PMV BLD AUTO: 8.4 FL (ref 5–10.5)
POTASSIUM SERPL-SCNC: 4.6 MMOL/L (ref 3.5–5.1)
PROT SERPL-MCNC: 6.9 G/DL (ref 6.4–8.2)
PROT UR STRIP.AUTO-MCNC: NEGATIVE MG/DL
PROTHROMBIN TIME: 15.6 SEC (ref 11.9–14.9)
RBC # BLD AUTO: 5.84 M/UL (ref 4–5.2)
RBC #/AREA URNS HPF: ABNORMAL /HPF (ref 0–4)
SODIUM SERPL-SCNC: 138 MMOL/L (ref 136–145)
SP GR UR STRIP.AUTO: >=1.03 (ref 1–1.03)
TROPONIN, HIGH SENSITIVITY: 12 NG/L (ref 0–14)
TROPONIN, HIGH SENSITIVITY: 13 NG/L (ref 0–14)
TROPONIN, HIGH SENSITIVITY: 14 NG/L (ref 0–14)
UA COMPLETE W REFLEX CULTURE PNL UR: YES
UA DIPSTICK W REFLEX MICRO PNL UR: YES
URN SPEC COLLECT METH UR: ABNORMAL
UROBILINOGEN UR STRIP-ACNC: 0.2 E.U./DL
WBC # BLD AUTO: 7.9 K/UL (ref 4–11)
WBC #/AREA URNS HPF: ABNORMAL /HPF (ref 0–5)
YEAST URNS QL MICRO: PRESENT /HPF

## 2024-12-24 PROCEDURE — 85730 THROMBOPLASTIN TIME PARTIAL: CPT

## 2024-12-24 PROCEDURE — 83880 ASSAY OF NATRIURETIC PEPTIDE: CPT

## 2024-12-24 PROCEDURE — 84484 ASSAY OF TROPONIN QUANT: CPT

## 2024-12-24 PROCEDURE — G0378 HOSPITAL OBSERVATION PER HR: HCPCS

## 2024-12-24 PROCEDURE — 6370000000 HC RX 637 (ALT 250 FOR IP): Performed by: HOSPITALIST

## 2024-12-24 PROCEDURE — 71045 X-RAY EXAM CHEST 1 VIEW: CPT

## 2024-12-24 PROCEDURE — 36415 COLL VENOUS BLD VENIPUNCTURE: CPT

## 2024-12-24 PROCEDURE — 85025 COMPLETE CBC W/AUTO DIFF WBC: CPT

## 2024-12-24 PROCEDURE — 93005 ELECTROCARDIOGRAM TRACING: CPT | Performed by: EMERGENCY MEDICINE

## 2024-12-24 PROCEDURE — 80053 COMPREHEN METABOLIC PANEL: CPT

## 2024-12-24 PROCEDURE — 81001 URINALYSIS AUTO W/SCOPE: CPT

## 2024-12-24 PROCEDURE — 87086 URINE CULTURE/COLONY COUNT: CPT

## 2024-12-24 PROCEDURE — 85610 PROTHROMBIN TIME: CPT

## 2024-12-24 PROCEDURE — 83735 ASSAY OF MAGNESIUM: CPT

## 2024-12-24 PROCEDURE — 99285 EMERGENCY DEPT VISIT HI MDM: CPT

## 2024-12-24 PROCEDURE — 2500000003 HC RX 250 WO HCPCS: Performed by: HOSPITALIST

## 2024-12-24 RX ORDER — SODIUM CHLORIDE 9 MG/ML
INJECTION, SOLUTION INTRAVENOUS PRN
Status: DISCONTINUED | OUTPATIENT
Start: 2024-12-24 | End: 2024-12-25 | Stop reason: HOSPADM

## 2024-12-24 RX ORDER — NORTRIPTYLINE HYDROCHLORIDE 25 MG/1
75 CAPSULE ORAL NIGHTLY
Status: DISCONTINUED | OUTPATIENT
Start: 2024-12-24 | End: 2024-12-25 | Stop reason: HOSPADM

## 2024-12-24 RX ORDER — POTASSIUM CHLORIDE 1500 MG/1
40 TABLET, EXTENDED RELEASE ORAL PRN
Status: DISCONTINUED | OUTPATIENT
Start: 2024-12-24 | End: 2024-12-25

## 2024-12-24 RX ORDER — ACETAMINOPHEN 325 MG/1
650 TABLET ORAL EVERY 6 HOURS PRN
Status: DISCONTINUED | OUTPATIENT
Start: 2024-12-24 | End: 2024-12-25 | Stop reason: HOSPADM

## 2024-12-24 RX ORDER — ACETAMINOPHEN 650 MG/1
650 SUPPOSITORY RECTAL EVERY 6 HOURS PRN
Status: DISCONTINUED | OUTPATIENT
Start: 2024-12-24 | End: 2024-12-25 | Stop reason: HOSPADM

## 2024-12-24 RX ORDER — PREGABALIN 50 MG/1
50 CAPSULE ORAL 3 TIMES DAILY
COMMUNITY

## 2024-12-24 RX ORDER — LEVOTHYROXINE SODIUM 100 UG/1
200 TABLET ORAL DAILY
Status: DISCONTINUED | OUTPATIENT
Start: 2024-12-25 | End: 2024-12-25 | Stop reason: HOSPADM

## 2024-12-24 RX ORDER — MORPHINE SULFATE 2 MG/ML
2 INJECTION, SOLUTION INTRAMUSCULAR; INTRAVENOUS EVERY 4 HOURS PRN
Status: DISCONTINUED | OUTPATIENT
Start: 2024-12-24 | End: 2024-12-25 | Stop reason: HOSPADM

## 2024-12-24 RX ORDER — FUROSEMIDE 20 MG/1
20 TABLET ORAL DAILY
Status: DISCONTINUED | OUTPATIENT
Start: 2024-12-24 | End: 2024-12-25 | Stop reason: HOSPADM

## 2024-12-24 RX ORDER — ONDANSETRON 2 MG/ML
4 INJECTION INTRAMUSCULAR; INTRAVENOUS EVERY 6 HOURS PRN
Status: DISCONTINUED | OUTPATIENT
Start: 2024-12-24 | End: 2024-12-25 | Stop reason: HOSPADM

## 2024-12-24 RX ORDER — ONDANSETRON 4 MG/1
4 TABLET, ORALLY DISINTEGRATING ORAL EVERY 8 HOURS PRN
Status: DISCONTINUED | OUTPATIENT
Start: 2024-12-24 | End: 2024-12-25 | Stop reason: HOSPADM

## 2024-12-24 RX ORDER — EZETIMIBE 10 MG/1
10 TABLET ORAL DAILY
Status: DISCONTINUED | OUTPATIENT
Start: 2024-12-24 | End: 2024-12-24 | Stop reason: RX

## 2024-12-24 RX ORDER — POTASSIUM CHLORIDE 7.45 MG/ML
10 INJECTION INTRAVENOUS PRN
Status: DISCONTINUED | OUTPATIENT
Start: 2024-12-24 | End: 2024-12-25

## 2024-12-24 RX ORDER — SOTALOL HYDROCHLORIDE 80 MG/1
80 TABLET ORAL 2 TIMES DAILY
Status: DISCONTINUED | OUTPATIENT
Start: 2024-12-24 | End: 2024-12-25 | Stop reason: HOSPADM

## 2024-12-24 RX ORDER — METHOCARBAMOL 500 MG/1
500 TABLET, FILM COATED ORAL 4 TIMES DAILY PRN
Status: DISCONTINUED | OUTPATIENT
Start: 2024-12-24 | End: 2024-12-25 | Stop reason: HOSPADM

## 2024-12-24 RX ORDER — SODIUM CHLORIDE 0.9 % (FLUSH) 0.9 %
5-40 SYRINGE (ML) INJECTION PRN
Status: DISCONTINUED | OUTPATIENT
Start: 2024-12-24 | End: 2024-12-25 | Stop reason: HOSPADM

## 2024-12-24 RX ORDER — MAGNESIUM SULFATE IN WATER 40 MG/ML
2000 INJECTION, SOLUTION INTRAVENOUS PRN
Status: DISCONTINUED | OUTPATIENT
Start: 2024-12-24 | End: 2024-12-25 | Stop reason: HOSPADM

## 2024-12-24 RX ORDER — SPIRONOLACTONE 25 MG/1
25 TABLET ORAL DAILY
Status: DISCONTINUED | OUTPATIENT
Start: 2024-12-24 | End: 2024-12-25 | Stop reason: HOSPADM

## 2024-12-24 RX ORDER — PREGABALIN 50 MG/1
50 CAPSULE ORAL 3 TIMES DAILY
Status: DISCONTINUED | OUTPATIENT
Start: 2024-12-24 | End: 2024-12-25 | Stop reason: HOSPADM

## 2024-12-24 RX ORDER — SODIUM CHLORIDE 0.9 % (FLUSH) 0.9 %
5-40 SYRINGE (ML) INJECTION EVERY 12 HOURS SCHEDULED
Status: DISCONTINUED | OUTPATIENT
Start: 2024-12-24 | End: 2024-12-25 | Stop reason: HOSPADM

## 2024-12-24 RX ORDER — POLYETHYLENE GLYCOL 3350 17 G/17G
17 POWDER, FOR SOLUTION ORAL DAILY PRN
Status: DISCONTINUED | OUTPATIENT
Start: 2024-12-24 | End: 2024-12-25 | Stop reason: HOSPADM

## 2024-12-24 RX ADMIN — PREGABALIN 50 MG: 50 CAPSULE ORAL at 16:11

## 2024-12-24 RX ADMIN — NORTRIPTYLINE HYDROCHLORIDE 75 MG: 25 CAPSULE ORAL at 20:21

## 2024-12-24 RX ADMIN — SACUBITRIL AND VALSARTAN 1 TABLET: 24; 26 TABLET, FILM COATED ORAL at 20:15

## 2024-12-24 RX ADMIN — FUROSEMIDE 20 MG: 20 TABLET ORAL at 16:11

## 2024-12-24 RX ADMIN — SPIRONOLACTONE 25 MG: 25 TABLET ORAL at 16:11

## 2024-12-24 RX ADMIN — PREGABALIN 50 MG: 50 CAPSULE ORAL at 20:15

## 2024-12-24 RX ADMIN — APIXABAN 5 MG: 5 TABLET, FILM COATED ORAL at 20:15

## 2024-12-24 RX ADMIN — ACETAMINOPHEN 650 MG: 325 TABLET ORAL at 22:08

## 2024-12-24 RX ADMIN — SODIUM CHLORIDE, PRESERVATIVE FREE 10 ML: 5 INJECTION INTRAVENOUS at 20:16

## 2024-12-24 RX ADMIN — SOTALOL HYDROCHLORIDE 80 MG: 80 TABLET ORAL at 20:15

## 2024-12-24 ASSESSMENT — PAIN - FUNCTIONAL ASSESSMENT: PAIN_FUNCTIONAL_ASSESSMENT: NONE - DENIES PAIN

## 2024-12-24 ASSESSMENT — ENCOUNTER SYMPTOMS
STRIDOR: 0
NAUSEA: 1
VOMITING: 0
CHEST TIGHTNESS: 1
CONSTIPATION: 0
BACK PAIN: 0
WHEEZING: 0
SHORTNESS OF BREATH: 1
COUGH: 0
COLOR CHANGE: 0
DIARRHEA: 0
ABDOMINAL PAIN: 0

## 2024-12-24 ASSESSMENT — PAIN DESCRIPTION - DESCRIPTORS: DESCRIPTORS: DISCOMFORT

## 2024-12-24 ASSESSMENT — PAIN SCALES - GENERAL
PAINLEVEL_OUTOF10: 6
PAINLEVEL_OUTOF10: 0

## 2024-12-24 ASSESSMENT — PAIN DESCRIPTION - LOCATION: LOCATION: HEAD

## 2024-12-24 NOTE — ED NOTES
Patient Name: Claire Dong  : 1955 69 y.o.  MRN: [id number]  ED Room #: ED-0008/     Chief complaint:   Chief Complaint   Patient presents with    Irregular Heart Beat     Pt said the maintenance mor scared her yesterday and she felt herself go back into a fib, hx afib, takes meds as prescribed, pt said she feels SOB and anxious     Hospital Problem/Diagnosis:   Hospital Problems             Last Modified POA    * (Principal) Chest pain 2024 Yes         O2 Flow Rate:O2 Device: None (Room air)   (if applicable)  Cardiac Rhythm:   (if applicable)  Active LDA's:   Peripheral IV 24 Posterior;Right Hand (Active)            How does patient ambulate? Unknown, did not assess in the Emergency Department    2. How does patient take pills? Unknown, no oral medications were given in the Emergency Department    3. Is patient alert? Alert    4. Is patient oriented? To Person, To Place, To Time, and To Situation    5.   Patient arrived from:  home  Facility Name: ___________________________________________    6. If patient is disoriented or from a Skill Nursing Facility has family been notified of admission? No    7. Patient belongings?     Disposition of belongings? Kept with Patient     8. Any specific patient or family belongings/needs/dynamics?   a.     9. Miscellaneous comments/pending orders?  a.      If there are any additional questions please reach out to the Emergency Department.

## 2024-12-24 NOTE — TELEPHONE ENCOUNTER
Patient called in stating that she has been back in Afib since yesterday morning. She took an extra 1/2 Sotalol at 3pm, 1/2 at 9pm on 12/23 and 1/2 at 9am this morning. Patient is experiencing shortness of breath, very weak, agitated and has had very bad heartburn for the past 2-3days.  Please call and advise.    Patient can be reached at [phone]

## 2024-12-24 NOTE — H&P
MPV 8.4 12/24/2024 12:46 PM     BMP:    Lab Results   Component Value Date/Time     12/24/2024 12:46 PM    K 4.6 12/24/2024 12:46 PM    K 4.0 09/28/2023 11:42 AM     12/24/2024 12:46 PM    CO2 21 12/24/2024 12:46 PM    BUN 20 12/24/2024 12:46 PM    CREATININE 1.0 12/24/2024 12:46 PM    CALCIUM 9.5 12/24/2024 12:46 PM    GFRAA >60 06/06/2022 05:24 AM    GFRAA >60 02/13/2013 12:07 PM    LABGLOM 60 12/24/2024 12:46 PM    LABGLOM >60 10/27/2023 05:12 PM    GLUCOSE 112 12/24/2024 12:46 PM       Chest Xray:   EKG:    I visualized CXR images and EKG strips     Discussed  with  ER attending.    Problem List  Principal Problem:    Chest pain  Resolved Problems:    * No resolved hospital problems. *        Assessment/Plan:     Chest pain.  With palpitation history of shortness or diaphoresis.  Does have some abnormal EKG changes.  Patient will be admitted for further evaluation but initial troponins are flat.  The Holter monitor did show A-fib.  Currently rate is controlled on sotalol and Eliquis.  Will consult cardiology monitor on telemetry for now.  Further recommendation to follow pending cardiology evaluation.      Lauren Marte MD    12/24/2024 2:05 PM

## 2024-12-24 NOTE — ED PROVIDER NOTES
I did not perform history or physical on Claire Dong.  This patient was seen independently by an NETO. I did review the EKG, which is documented below.     EKG  The Ekg interpreted by me in the absence of a cardiologist shows.  normal sinus rhythm with a rate of 65  Axis is   Left axis deviation  QTc is  normal  Incomplete right bundle branch block.  T wave inversions in the anterior and lateral leads, possibly suggestive of ischemia.  No ST elevation or depression to indicate infarction  T wave inversions are new in comparison to previous EKG from August 7, 2024        Hammad Seth MD  12/24/24 9730    
No discharge.      Extraocular Movements: Extraocular movements intact.      Conjunctiva/sclera: Conjunctivae normal.      Pupils: Pupils are equal, round, and reactive to light.   Cardiovascular:      Rate and Rhythm: Normal rate.   Pulmonary:      Effort: Pulmonary effort is normal.      Breath sounds: Normal breath sounds.   Abdominal:      General: Bowel sounds are normal.      Palpations: Abdomen is soft.      Tenderness: There is no abdominal tenderness. There is no right CVA tenderness or left CVA tenderness.   Musculoskeletal:         General: Normal range of motion.      Cervical back: Normal range of motion.      Comments: No acute extremity edema, posterior calf without tenderness or palpable cord.  Negative Homans   Skin:     General: Skin is warm.      Capillary Refill: Capillary refill takes less than 2 seconds.      Coloration: Skin is not jaundiced or pale.      Findings: No bruising, erythema, lesion or rash.   Neurological:      General: No focal deficit present.      Mental Status: She is alert and oriented to person, place, and time.   Psychiatric:         Behavior: Behavior normal.             DIAGNOSTIC RESULTS   LABS:    Labs Reviewed   CBC WITH AUTO DIFFERENTIAL - Abnormal; Notable for the following components:       Result Value    RBC 5.84 (*)     RDW 15.7 (*)     All other components within normal limits   COMPREHENSIVE METABOLIC PANEL - Abnormal; Notable for the following components:    Glucose 112 (*)     Est, Glom Filt Rate 60 (*)     ALT 7 (*)     All other components within normal limits   BRAIN NATRIURETIC PEPTIDE - Abnormal; Notable for the following components:    NT Pro-BNP 1,694 (*)     All other components within normal limits   PROTIME-INR - Abnormal; Notable for the following components:    Protime 15.6 (*)     INR 1.22 (*)     All other components within normal limits   TROPONIN   APTT   MAGNESIUM   URINALYSIS WITH REFLEX TO CULTURE       When ordered only abnormal lab

## 2024-12-24 NOTE — TELEPHONE ENCOUNTER
Spoke to patient regarding previous phone note. She states she has checked her heart rhythm with [employer] and it is suggestive of afib. She states the symptoms are worse than previous episodes. Recommended ER evaluation. Patient verbalized understanding.

## 2024-12-25 VITALS
SYSTOLIC BLOOD PRESSURE: 108 MMHG | TEMPERATURE: 97.1 F | DIASTOLIC BLOOD PRESSURE: 79 MMHG | OXYGEN SATURATION: 100 % | HEART RATE: 65 BPM | HEIGHT: 68 IN | WEIGHT: 293 LBS | BODY MASS INDEX: 44.41 KG/M2 | RESPIRATION RATE: 18 BRPM

## 2024-12-25 PROBLEM — I25.10 CORONARY ARTERY DISEASE INVOLVING NATIVE CORONARY ARTERY OF NATIVE HEART WITHOUT ANGINA PECTORIS: Status: ACTIVE | Noted: 2024-12-25

## 2024-12-25 PROBLEM — R00.2 PALPITATIONS: Status: ACTIVE | Noted: 2024-12-25

## 2024-12-25 PROBLEM — R07.89 CHEST TIGHTNESS: Status: ACTIVE | Noted: 2024-12-25

## 2024-12-25 LAB
ANION GAP SERPL CALCULATED.3IONS-SCNC: 12 MMOL/L (ref 3–16)
BASOPHILS # BLD: 0.1 K/UL (ref 0–0.2)
BASOPHILS NFR BLD: 1.3 %
BUN SERPL-MCNC: 21 MG/DL (ref 7–20)
CALCIUM SERPL-MCNC: 8.5 MG/DL (ref 8.3–10.6)
CHLORIDE SERPL-SCNC: 102 MMOL/L (ref 99–110)
CO2 SERPL-SCNC: 24 MMOL/L (ref 21–32)
CREAT SERPL-MCNC: 1 MG/DL (ref 0.6–1.2)
DEPRECATED RDW RBC AUTO: 15.6 % (ref 12.4–15.4)
EKG ATRIAL RATE: 65 BPM
EKG DIAGNOSIS: NORMAL
EKG P-R INTERVAL: 130 MS
EKG Q-T INTERVAL: 418 MS
EKG QRS DURATION: 106 MS
EKG QTC CALCULATION (BAZETT): 434 MS
EKG R AXIS: 201 DEGREES
EKG T AXIS: 29 DEGREES
EKG VENTRICULAR RATE: 65 BPM
EOSINOPHIL # BLD: 0.1 K/UL (ref 0–0.6)
EOSINOPHIL NFR BLD: 2.1 %
GFR SERPLBLD CREATININE-BSD FMLA CKD-EPI: 60 ML/MIN/{1.73_M2}
GLUCOSE SERPL-MCNC: 100 MG/DL (ref 70–99)
HCT VFR BLD AUTO: 41.9 % (ref 36–48)
HGB BLD-MCNC: 13.6 G/DL (ref 12–16)
LYMPHOCYTES # BLD: 3 K/UL (ref 1–5.1)
LYMPHOCYTES NFR BLD: 44.9 %
MAGNESIUM SERPL-MCNC: 1.99 MG/DL (ref 1.8–2.4)
MCH RBC QN AUTO: 26.5 PG (ref 26–34)
MCHC RBC AUTO-ENTMCNC: 32.5 G/DL (ref 31–36)
MCV RBC AUTO: 81.7 FL (ref 80–100)
MONOCYTES # BLD: 0.4 K/UL (ref 0–1.3)
MONOCYTES NFR BLD: 6.7 %
NEUTROPHILS # BLD: 3 K/UL (ref 1.7–7.7)
NEUTROPHILS NFR BLD: 45 %
PLATELET # BLD AUTO: 246 K/UL (ref 135–450)
PMV BLD AUTO: 7.9 FL (ref 5–10.5)
POTASSIUM SERPL-SCNC: 3.2 MMOL/L (ref 3.5–5.1)
RBC # BLD AUTO: 5.13 M/UL (ref 4–5.2)
SODIUM SERPL-SCNC: 138 MMOL/L (ref 136–145)
WBC # BLD AUTO: 6.6 K/UL (ref 4–11)

## 2024-12-25 PROCEDURE — 36415 COLL VENOUS BLD VENIPUNCTURE: CPT

## 2024-12-25 PROCEDURE — 93010 ELECTROCARDIOGRAM REPORT: CPT | Performed by: INTERNAL MEDICINE

## 2024-12-25 PROCEDURE — 96375 TX/PRO/DX INJ NEW DRUG ADDON: CPT

## 2024-12-25 PROCEDURE — 99223 1ST HOSP IP/OBS HIGH 75: CPT | Performed by: INTERNAL MEDICINE

## 2024-12-25 PROCEDURE — 6370000000 HC RX 637 (ALT 250 FOR IP): Performed by: HOSPITALIST

## 2024-12-25 PROCEDURE — 80048 BASIC METABOLIC PNL TOTAL CA: CPT

## 2024-12-25 PROCEDURE — 96374 THER/PROPH/DIAG INJ IV PUSH: CPT

## 2024-12-25 PROCEDURE — 85025 COMPLETE CBC W/AUTO DIFF WBC: CPT

## 2024-12-25 PROCEDURE — 83735 ASSAY OF MAGNESIUM: CPT

## 2024-12-25 PROCEDURE — 2500000003 HC RX 250 WO HCPCS: Performed by: HOSPITALIST

## 2024-12-25 PROCEDURE — G0378 HOSPITAL OBSERVATION PER HR: HCPCS

## 2024-12-25 PROCEDURE — 6360000002 HC RX W HCPCS: Performed by: HOSPITALIST

## 2024-12-25 RX ORDER — POTASSIUM CHLORIDE 1500 MG/1
40 TABLET, EXTENDED RELEASE ORAL PRN
Status: DISCONTINUED | OUTPATIENT
Start: 2024-12-25 | End: 2024-12-25 | Stop reason: HOSPADM

## 2024-12-25 RX ORDER — LEVOFLOXACIN 250 MG/1
250 TABLET, FILM COATED ORAL DAILY
Qty: 5 TABLET | Refills: 0 | Status: SHIPPED | OUTPATIENT
Start: 2024-12-25 | End: 2024-12-30

## 2024-12-25 RX ORDER — LEVOFLOXACIN 250 MG/1
250 TABLET, FILM COATED ORAL DAILY
Qty: 5 TABLET | Refills: 0 | Status: SHIPPED | OUTPATIENT
Start: 2024-12-25 | End: 2024-12-25

## 2024-12-25 RX ORDER — FUROSEMIDE 10 MG/ML
40 INJECTION INTRAMUSCULAR; INTRAVENOUS ONCE
Status: COMPLETED | OUTPATIENT
Start: 2024-12-25 | End: 2024-12-25

## 2024-12-25 RX ORDER — POTASSIUM CHLORIDE 7.45 MG/ML
10 INJECTION INTRAVENOUS PRN
Status: DISCONTINUED | OUTPATIENT
Start: 2024-12-25 | End: 2024-12-25 | Stop reason: HOSPADM

## 2024-12-25 RX ADMIN — SOTALOL HYDROCHLORIDE 80 MG: 80 TABLET ORAL at 08:48

## 2024-12-25 RX ADMIN — APIXABAN 5 MG: 5 TABLET, FILM COATED ORAL at 08:48

## 2024-12-25 RX ADMIN — LEVOTHYROXINE SODIUM 200 MCG: 0.1 TABLET ORAL at 05:30

## 2024-12-25 RX ADMIN — EMPAGLIFLOZIN 10 MG: 10 TABLET, FILM COATED ORAL at 08:48

## 2024-12-25 RX ADMIN — SODIUM CHLORIDE, PRESERVATIVE FREE 10 ML: 5 INJECTION INTRAVENOUS at 08:49

## 2024-12-25 RX ADMIN — FUROSEMIDE 40 MG: 10 INJECTION, SOLUTION INTRAMUSCULAR; INTRAVENOUS at 11:32

## 2024-12-25 RX ADMIN — SPIRONOLACTONE 25 MG: 25 TABLET ORAL at 08:48

## 2024-12-25 RX ADMIN — PREGABALIN 50 MG: 50 CAPSULE ORAL at 08:48

## 2024-12-25 RX ADMIN — WATER 1000 MG: 1 INJECTION INTRAMUSCULAR; INTRAVENOUS; SUBCUTANEOUS at 10:11

## 2024-12-25 RX ADMIN — POTASSIUM CHLORIDE 40 MEQ: 1500 TABLET, EXTENDED RELEASE ORAL at 05:30

## 2024-12-25 RX ADMIN — SACUBITRIL AND VALSARTAN 1 TABLET: 24; 26 TABLET, FILM COATED ORAL at 08:48

## 2024-12-25 RX ADMIN — FUROSEMIDE 20 MG: 20 TABLET ORAL at 08:48

## 2024-12-25 ASSESSMENT — PAIN SCALES - GENERAL
PAINLEVEL_OUTOF10: 0
PAINLEVEL_OUTOF10: 0

## 2024-12-25 NOTE — PROGRESS NOTES
Pt discharged home with belongings. Son to transport home. Discharge instructions discussed and pt verbalized understanding. She is aware her antibiotics have been called in to [facility] and will be ready tomorrow. IV removed without complications.

## 2024-12-25 NOTE — CARE COORDINATION
Case Management -  Discharge Note      Patient Name: Claire Dong                   YOB: 1955  Room: [unfilled]            Readmission Risk (Low < 19, Mod (19-27), High > 27): No data recorded  Current PCP: Yari Santos MD      ([facility]) Important Message from Medicare:    Has pt received appropriate compliance notices before being discharged if required: N/A  Compliance doc:  [] 2nd IMM; [] Code 44 [] Moon  Date: N/A         [facility] agency notified of discharge:  [] Yes [] No  [x] NA    Family notified of discharge:  [x] Yes  [] No  [] NA    Facility notified of discharge:  [] Yes  [] No  [x] NA     Financial    Payor: HU[employer]NA MEDICARE / Plan: HU[employer]NA GOLD PLUS HMO / Product Type: *No Product type* /     Pharmacy:  Potential assistance Purchasing Medications: No  Meds-to-Beds request:        [facility] - [place] [address] - P [phone] - F [phone]  [address]  [facility] [address]  Phone: [phone] Fax: [phone]    [address] - P [phone] - F [phone]  [address] [place] [address]  Phone: [phone] Fax: [phone]    [facility] [address] - P [phone] - F [phone]  [address]  [facility] [address]  Phone: [phone] Fax: [phone]      Notes:    Additional Case Management Notes: Patient has no case management needs at this time. Patient will return home with family.    Electronically signed by TAE Porter on 12/25/2024 at 8:41 AM

## 2024-12-25 NOTE — CONSULTS
cigarettes. She has never used smokeless tobacco. She reports that she does not currently use drugs after having used the following drugs: Marijuana (Weed). She reports that she does not drink alcohol.     Family History:  Reviewed. family history includes Diabetes in her mother; Heart Disease in her brother; Hypertension in her mother; Stroke in her father.     Review of System:  All other systems reviewed and are negative except for that noted above. Pertinent negatives are:     General: negative for fever, chills   Ophthalmic ROS: negative for - eye pain or loss of vision  ENT ROS: negative for - headaches, sore throat   Respiratory: negative for - cough, sputum  Cardiovascular: Reviewed in HPI  Gastrointestinal: negative for - abdominal pain, diarrhea, N/V  Hematology: negative for - bleeding, blood clots, bruising or jaundice  Genito-Urinary:  negative for - Dysuria or incontinence  Musculoskeletal: negative for - Joint swelling, muscle pain  Neurological: negative for - confusion, dizziness, headaches   Psychiatric: No anxiety, no depression.  Dermatological: negative for - rash    Physical Examination:  Vitals:    24 0500   BP: 115/60   Pulse: 57   Resp: 16   Temp: 98 °F (36.7 °C)   SpO2: 96%      In: 480 [P.O.:480]  Out: 500    Wt Readings from Last 3 Encounters:   24 133 kg (293 lb 4.8 oz)   10/29/24 127.5 kg (281 lb)   24 127 kg (280 lb)     Temp  Av °F (36.7 °C)  Min: 97.6 °F (36.4 °C)  Max: 98.6 °F (37 °C)  Pulse  Av.6  Min: 56  Max: 76  BP  Min: 113/65  Max: 137/93  SpO2  Av.8 %  Min: 95 %  Max: 98 %    Intake/Output Summary (Last 24 hours) at 2024 0808  Last data filed at 2024 0530  Gross per 24 hour   Intake 480 ml   Output 500 ml   Net -20 ml       Telemetry: Sinus rhythm   Constitutional: Oriented. No distress.   Head: Normocephalic and atraumatic.   Mouth/Throat: Oropharynx is clear and moist.   Eyes: Conjunctivae normal. EOM are normal.   Neck: Neck

## 2024-12-26 ENCOUNTER — TELEPHONE (OUTPATIENT)
Dept: CARDIOLOGY CLINIC | Age: 69
End: 2024-12-26

## 2024-12-26 LAB — BACTERIA UR CULT: NORMAL

## 2024-12-26 NOTE — TELEPHONE ENCOUNTER
Pt cannot make the appointment tomorrow due to a meeting she is involved in and she will be gone all day.

## 2024-12-26 NOTE — TELEPHONE ENCOUNTER
Called pt. She states her last BNP is concerning for her at 1694 on 12/24/24.   AQUILES Resendez has offered 1400 appointment tomorrow on 12/27/24. If she calls back, please set her up for 2pm appointment

## 2024-12-26 NOTE — TELEPHONE ENCOUNTER
Pt was in [facility] hosp over [place] and has questions. Asking if npkv could call her.

## 2024-12-27 ENCOUNTER — TELEPHONE (OUTPATIENT)
Dept: CARDIOLOGY CLINIC | Age: 69
End: 2024-12-27

## 2024-12-27 NOTE — TELEPHONE ENCOUNTER
Medication Refill    Medication needing refilled:    levoFLOXacin (LEVAQUIN   Dosage of the medication:  250 MG tablet   How are you taking this medication (QD, BID, TID, QID, PRN):    30 or 90 day supply called in:    When will you run out of your medication:    Which Pharmacy are we sending the medication to?:  [facility] - [place], [place] - [address] - P [phone] - F [phone]  [address] [facility] [address]  Phone: [phone]  Fax: [phone]

## 2025-01-02 ASSESSMENT — ENCOUNTER SYMPTOMS
RESPIRATORY NEGATIVE: 1
GASTROINTESTINAL NEGATIVE: 1

## 2025-01-02 NOTE — PROGRESS NOTES
10/27/2023 05:12 PM    RDW 15.6 12/25/2024 04:13 AM    RDW 15.7 12/24/2024 12:46 PM    RDW 14.7 10/27/2023 05:12 PM     12/25/2024 04:13 AM     12/24/2024 12:46 PM     10/27/2023 05:12 PM     BMP:  Lab Results   Component Value Date/Time     12/25/2024 04:13 AM     12/24/2024 12:46 PM     10/27/2023 05:12 PM    K 3.2 12/25/2024 04:13 AM    K 4.6 12/24/2024 12:46 PM    K 3.7 10/27/2023 06:15 PM    K 3.5 10/09/2023 04:56 AM    K 4.0 09/28/2023 11:42 AM    K 4.4 01/23/2023 04:19 PM     12/25/2024 04:13 AM     12/24/2024 12:46 PM    CL 97 10/27/2023 05:12 PM    CO2 24 12/25/2024 04:13 AM    CO2 21 12/24/2024 12:46 PM    CO2 19 10/27/2023 05:12 PM    PHOS 4.1 05/17/2014 07:27 AM    PHOS 3.6 05/16/2014 07:10 AM    PHOS 3.4 05/15/2014 04:52 PM    BUN 21 12/25/2024 04:13 AM    BUN 20 12/24/2024 12:46 PM    BUN 19 10/27/2023 05:12 PM    CREATININE 1.0 12/25/2024 04:13 AM    CREATININE 1.0 12/24/2024 12:46 PM    CREATININE 0.9 10/27/2023 05:12 PM     BNP:   Lab Results   Component Value Date/Time    PROBNP 1,694 12/24/2024 12:46 PM    PROBNP 212 10/27/2023 05:12 PM    PROBNP 337 10/08/2023 04:47 AM    PROBNP 1,288 10/05/2023 08:19 PM     Iron Studies:    Lab Results   Component Value Date/Time    TIBC 412 06/13/2014 03:44 PM    TIBC 375 10/17/2013 03:33 PM    FERRITIN 34 10/17/2013 03:33 PM     Lab Results   Component Value Date    IRON 45 06/13/2014    TIBC 412 06/13/2014    FERRITIN 34 10/17/2013        Assessment/Plan:    1. diastolic congestive heart failure (HCC) - compensated, continue entresto, brady and jardiance, labs in 3-4weeks   2. PAF (paroxysmal atrial fibrillation) (HCC) -continue betapace and AC   3. Hypertension- controlled   4. Aortic valve stenosis- moderate to severe, seen by DCE, echo ordered             Instructions:   Medications: no change in meds  Labs: in one month  Lifestyle Recommendations: Weigh yourself every day in the morning after urination,

## 2025-01-03 ENCOUNTER — OFFICE VISIT (OUTPATIENT)
Dept: CARDIOLOGY CLINIC | Age: 70
End: 2025-01-03

## 2025-01-03 VITALS
BODY MASS INDEX: 42.59 KG/M2 | DIASTOLIC BLOOD PRESSURE: 70 MMHG | HEART RATE: 65 BPM | HEIGHT: 68 IN | WEIGHT: 281 LBS | SYSTOLIC BLOOD PRESSURE: 116 MMHG | OXYGEN SATURATION: 97 %

## 2025-01-03 DIAGNOSIS — I35.0 AORTIC VALVE STENOSIS, ETIOLOGY OF CARDIAC VALVE DISEASE UNSPECIFIED: ICD-10-CM

## 2025-01-03 DIAGNOSIS — I10 BENIGN ESSENTIAL HTN: ICD-10-CM

## 2025-01-03 DIAGNOSIS — Z09 HOSPITAL DISCHARGE FOLLOW-UP: ICD-10-CM

## 2025-01-03 DIAGNOSIS — I50.32 CHRONIC DIASTOLIC (CONGESTIVE) HEART FAILURE (HCC): Primary | ICD-10-CM

## 2025-01-03 DIAGNOSIS — I48.0 PAF (PAROXYSMAL ATRIAL FIBRILLATION) (HCC): Chronic | ICD-10-CM

## 2025-01-03 RX ORDER — ASPIRIN 81 MG/1
81 TABLET ORAL DAILY
COMMUNITY

## 2025-01-03 NOTE — PATIENT INSTRUCTIONS
Instructions:   Medications: no change in meds  Labs: in one month  Lifestyle Recommendations: Weigh yourself every day in the morning after urination, call Rita if wt increases 2-3lb in one day or 5lb in one week, Limit sodium to 3000mg/day and fluids to 2L or 64oz/day.   Follow up:6 months      [place] CHF Resource Line: [phone]

## 2025-01-10 ENCOUNTER — TELEPHONE (OUTPATIENT)
Dept: CARDIOLOGY CLINIC | Age: 70
End: 2025-01-10

## 2025-01-10 NOTE — TELEPHONE ENCOUNTER
Pt took her Sotalol, Eliquis, & Entresto at 9 pm as normal. Then around 11 or 12 pm she accidentally took the Sotalol, Eliquis, & Entresto again instead of her bedtime medicine. She called the ER and spoke with a doctor who advised her to skip taking the dose of those that she would have normally taken this morning at 9 am. Also, told her that if her bp dropped lower than 80 she should go to the ER. She said that her bp actually went up to 150/57 which never happens to her. She was afraid to sleep. This morning her bp was still high at 140/80. She is asking if she should take those medications tonight as normal or if she should skip them again. Please call to advise.

## 2025-01-10 NOTE — TELEPHONE ENCOUNTER
Both Entresto and eliquis are twice a day so she should have taken those this am anyway. Take regular scheduled meds tonight. RAMAN

## 2025-01-28 NOTE — TELEPHONE ENCOUNTER
Received refill request for Eliquis from [facility].    Last ov:01/03/2025 NPKV    Last labs:12/25/2024 BMP    Last Refill:11/18/2024    Next appointment:02/21/2025 NPKV    *Patient was 30 supply sent to [facility].*

## 2025-02-06 RX ORDER — SACUBITRIL AND VALSARTAN 24; 26 MG/1; MG/1
1 TABLET, FILM COATED ORAL 2 TIMES DAILY
Qty: 180 TABLET | Refills: 3 | Status: SHIPPED | OUTPATIENT
Start: 2025-02-06

## 2025-02-06 NOTE — TELEPHONE ENCOUNTER
Patient says she was accepted into the [facility] to receive her Entresto, but when she called them to get her refill, they said they did not have an RX for her on file.  She is asking for a call back to advise as well [phone]    Medication Refill  Medication needing refilled:   sacubitril-valsartan (ENTRESTO) 24-26 MG per tablet     Dosage of the medication: 24-26MG    How are you taking this medication (QD, BID, TID, QID, PRN): TAKE 1 TABLET BY MOUTH 2 TIMES A DAY     30 or 90 day supply called in:    When will you run out of your medication: 2 weeks    Which Pharmacy are we sending the medication to?:   [facility] [phone]

## 2025-02-21 NOTE — TELEPHONE ENCOUNTER
Received refill request for  spironolactone (ALDACTONE) 25 MG tablet  from [facility].     Last OV: 2/21/2025 NPKV    Next OV: 2/26/2025 NPKV    Last Labs: 12/25/2024 BMP    Last Filled: 10/30/2024 NPRG

## 2025-02-24 ENCOUNTER — HOSPITAL ENCOUNTER (OUTPATIENT)
Age: 70
Discharge: HOME | End: 2025-02-24
Payer: MEDICARE

## 2025-02-24 DIAGNOSIS — I50.32 CHRONIC DIASTOLIC (CONGESTIVE) HEART FAILURE (HCC): ICD-10-CM

## 2025-02-24 PROCEDURE — 80048 BASIC METABOLIC PNL TOTAL CA: CPT

## 2025-02-24 PROCEDURE — 36415 COLL VENOUS BLD VENIPUNCTURE: CPT

## 2025-02-24 PROCEDURE — 83880 ASSAY OF NATRIURETIC PEPTIDE: CPT

## 2025-02-24 ASSESSMENT — ENCOUNTER SYMPTOMS
RESPIRATORY NEGATIVE: 1
GASTROINTESTINAL NEGATIVE: 1

## 2025-02-24 NOTE — TELEPHONE ENCOUNTER
Medication Refill    Medication needing refilled:  spironolactone     Dosage of the medication:  25mg    How are you taking this medication (QD, BID, TID, QID, PRN):   TAKE ONE TABLET EVERY DAY     30 or 90 day supply called in: 90    When will you run out of your medication:    Which Pharmacy are we sending the medication to?:     [facility]  [address]  [facility] [address]  Phone: [phone]  Fax: [phone]

## 2025-02-24 NOTE — PROGRESS NOTES
[facility]   Congestive Heart Failure    Primary Care Doctor:  Yari Santos MD  Primary Cardiologist: Ashley        Chief Complaint:  fatigue    History of Present Illness:  Claire Dong is a 69 y.o. female with PMH ELLIE, obesity, HTN, HLD, DM, AS, PAF,  who presents today for chf f/u. No change at last OV    Today she is feeling well, wt is stable and she denies chest pain, palpitations, SOB, orthopnea, PND, exertional chest pressure/discomfort, early saiety, edema, syncope.    Today's home wt: 279    Baseline Weight: 275-280  Wt Readings from Last 3 Encounters:   02/26/25 126.3 kg (278 lb 6.4 oz)   01/03/25 127.5 kg (281 lb)   12/25/24 133 kg (293 lb 4.8 oz)          Cardiac Imaging:Echo 5/28/24:     Left Ventricle: Normal left ventricular systolic function with a visually estimated EF of 50 - 55%. Left ventricle size is normal. Normal wall thickness. Normal wall motion. Abnormal but indeterminate diastolic function. Average E/e' ratio is 13.00.    Aortic Valve: Mild regurgitation. Moderate to severe stenosis of the aortic valve. Low stroke volume index (35 mL/m2) with AV mean gradient is 28 mmHg. AV peak velocity is 3.6 m/s. AV area by continuity VTI is 1.2 cm2.    Image quality is poor. Contrast used: Definity. Technically difficult study due to patient's body habitus.    ECHO: 10/23   Left ventricular cavity size appear normal. Difficult to assess LV function due to limited acoustic views, but ejection fraction appears to be preserved. Indeterminate diastolic function.     Stress Test: 2015  Impression: no reversible ischemia     Cath: 6/22  LM       Normal  LAD     Proximal ectasia, 40% distal  Cx        Proximal to mid ectasia  RI         N/A  RCA     Proximal to mid ectasia, 30% distal  LVEDP            NA  LVG     NA     Intervention:         None     Post Cath Dx:       Nonobstructive CAD    Activity: below baseline  Can you walk 1-2 blocks or do a moderate amount of house/yard

## 2025-02-25 ENCOUNTER — TELEPHONE (OUTPATIENT)
Dept: CARDIOLOGY CLINIC | Age: 70
End: 2025-02-25

## 2025-02-25 LAB
ANION GAP SERPL CALCULATED.3IONS-SCNC: 13 MMOL/L (ref 3–16)
BUN SERPL-MCNC: 20 MG/DL (ref 7–20)
CALCIUM SERPL-MCNC: 9.6 MG/DL (ref 8.3–10.6)
CHLORIDE SERPL-SCNC: 102 MMOL/L (ref 99–110)
CO2 SERPL-SCNC: 27 MMOL/L (ref 21–32)
CREAT SERPL-MCNC: 1 MG/DL (ref 0.6–1.2)
GFR SERPLBLD CREATININE-BSD FMLA CKD-EPI: 60 ML/MIN/{1.73_M2}
GLUCOSE SERPL-MCNC: 106 MG/DL (ref 70–99)
NT-PROBNP SERPL-MCNC: 122 PG/ML (ref 0–124)
POTASSIUM SERPL-SCNC: 4.3 MMOL/L (ref 3.5–5.1)
SODIUM SERPL-SCNC: 142 MMOL/L (ref 136–145)

## 2025-02-25 RX ORDER — SPIRONOLACTONE 25 MG/1
25 TABLET ORAL DAILY
Qty: 90 TABLET | Refills: 0 | Status: SHIPPED | OUTPATIENT
Start: 2025-02-25

## 2025-02-25 NOTE — TELEPHONE ENCOUNTER
----- Message from AQUILES Garcia CNP sent at 2/25/2025  8:33 AM EST -----  Labs look great, no new orders. KJRAMILA

## 2025-02-26 ENCOUNTER — OFFICE VISIT (OUTPATIENT)
Dept: CARDIOLOGY CLINIC | Age: 70
End: 2025-02-26
Payer: MEDICARE

## 2025-02-26 VITALS
BODY MASS INDEX: 42.19 KG/M2 | HEART RATE: 69 BPM | SYSTOLIC BLOOD PRESSURE: 132 MMHG | OXYGEN SATURATION: 93 % | WEIGHT: 278.4 LBS | DIASTOLIC BLOOD PRESSURE: 88 MMHG | HEIGHT: 68 IN

## 2025-02-26 DIAGNOSIS — I10 BENIGN ESSENTIAL HTN: ICD-10-CM

## 2025-02-26 DIAGNOSIS — I48.0 PAF (PAROXYSMAL ATRIAL FIBRILLATION) (HCC): Chronic | ICD-10-CM

## 2025-02-26 DIAGNOSIS — I35.0 AORTIC VALVE STENOSIS, ETIOLOGY OF CARDIAC VALVE DISEASE UNSPECIFIED: ICD-10-CM

## 2025-02-26 DIAGNOSIS — I50.32 CHRONIC DIASTOLIC (CONGESTIVE) HEART FAILURE (HCC): Primary | ICD-10-CM

## 2025-02-26 PROCEDURE — G8427 DOCREV CUR MEDS BY ELIG CLIN: HCPCS | Performed by: NURSE PRACTITIONER

## 2025-02-26 PROCEDURE — 1159F MED LIST DOCD IN RCRD: CPT | Performed by: NURSE PRACTITIONER

## 2025-02-26 PROCEDURE — 3075F SYST BP GE 130 - 139MM HG: CPT | Performed by: NURSE PRACTITIONER

## 2025-02-26 PROCEDURE — 3017F COLORECTAL CA SCREEN DOC REV: CPT | Performed by: NURSE PRACTITIONER

## 2025-02-26 PROCEDURE — 1160F RVW MEDS BY RX/DR IN RCRD: CPT | Performed by: NURSE PRACTITIONER

## 2025-02-26 PROCEDURE — 1036F TOBACCO NON-USER: CPT | Performed by: NURSE PRACTITIONER

## 2025-02-26 PROCEDURE — G8417 CALC BMI ABV UP PARAM F/U: HCPCS | Performed by: NURSE PRACTITIONER

## 2025-02-26 PROCEDURE — 1123F ACP DISCUSS/DSCN MKR DOCD: CPT | Performed by: NURSE PRACTITIONER

## 2025-02-26 PROCEDURE — 1090F PRES/ABSN URINE INCON ASSESS: CPT | Performed by: NURSE PRACTITIONER

## 2025-02-26 PROCEDURE — G8400 PT W/DXA NO RESULTS DOC: HCPCS | Performed by: NURSE PRACTITIONER

## 2025-02-26 PROCEDURE — 99214 OFFICE O/P EST MOD 30 MIN: CPT | Performed by: NURSE PRACTITIONER

## 2025-02-26 PROCEDURE — 3079F DIAST BP 80-89 MM HG: CPT | Performed by: NURSE PRACTITIONER

## 2025-02-26 RX ORDER — FUROSEMIDE 20 MG/1
20 TABLET ORAL DAILY
Qty: 90 TABLET | Refills: 3 | Status: SHIPPED | OUTPATIENT
Start: 2025-02-26

## 2025-02-26 NOTE — PATIENT INSTRUCTIONS
Instructions:   Medications: no change in meds  Labs: before next office visit  Lifestyle Recommendations: Weigh yourself every day in the morning after urination, call Rita if wt increases 2-3lb in one day or 5lb in one week, Limit sodium to 3000mg/day and fluids to 2L or 64oz/day.   Follow up:9 months      [place] CHF Resource Line: [phone]

## 2025-02-26 NOTE — PROGRESS NOTES
[facility]  H+P  Consult  OP Visit  FU Visit   CC/HPI   CC Followup visit for cardiac conditions detailed in assessment and plan below.   Intervention None   General Doing well.  No new concerns.     Cardiac Sx -CP, -SOB, -dizziness, -syncope, -edema, -orthopnea, -pnd, -fatigue, -palpitations   HISTORY/ALLERGY/ROS   M/S/S/F Hx Reviewed in Epic and updated as appropriate   ALLERG Cephalosporins, Dilaudid [hydromorphone hcl], Fortaz [ceftazidime], and Statins   ROS Full ROS obtained and negative except as mentioned in HPI   MEDICATIONS   Cardiac medications reviewed in Epic during visit with patient.   PHYSICAL EXAM   Vitals /78 (Site: Right Upper Arm, Position: Sitting, Cuff Size: Large Adult)   Pulse 61   Ht 1.702 m (5' 7\")   Wt 127 kg (280 lb)   SpO2 96%   BMI 43.85 kg/m²    Gen Alert, coop, no distress Heart  RRR, 3/6   Lung CTA-B, unlabored, no DTP Extrem Edema -Grade 1 (2mm)      COMPLIANCE   Discussed and counseled on diet, exercise, weight loss, smoking, alcohol, drugs.  All questions answered.   CODING   SCI (20905) - 30-39 mins spent reviewing hx/tests/consults, performing exam, counseling/educating, ordering meds/tests/procedures, referring/communicating w/PCP/consultants, documenting in EMR, interpreting results, communicating medical information and plan with family.   SCRIBE ATTESTATION   Nurse I, Maria Isabel Izquierdo RN, am scribing for and in the presence of Matias Henry MD. 2/26/2025 11:28 AM EST   Doctor Maria Isabel Izquierdo is working as scribe for and in presence of me and may have prepopulated components of note with my historical intellectual property (IP) under my supervision.  Any additions to this IP performed in my presence and at my direction. Content and accuracy of this note reviewed by me (Matias Henry MD).   NEW MEDICATIONS   Pt verbalizes understanding of the need for treatment and education provided at today's visit.  Additional education provided in AVS.   ASSESSMENT AND

## 2025-02-27 ENCOUNTER — HOSPITAL ENCOUNTER (OUTPATIENT)
Age: 70
Discharge: HOME | End: 2025-03-01
Attending: INTERNAL MEDICINE
Payer: MEDICARE

## 2025-02-27 ENCOUNTER — OFFICE VISIT (OUTPATIENT)
Dept: CARDIOLOGY CLINIC | Age: 70
End: 2025-02-27
Payer: MEDICARE

## 2025-02-27 VITALS
WEIGHT: 278 LBS | BODY MASS INDEX: 43.63 KG/M2 | DIASTOLIC BLOOD PRESSURE: 83 MMHG | SYSTOLIC BLOOD PRESSURE: 137 MMHG | HEIGHT: 67 IN

## 2025-02-27 VITALS
HEART RATE: 61 BPM | DIASTOLIC BLOOD PRESSURE: 78 MMHG | BODY MASS INDEX: 43.95 KG/M2 | OXYGEN SATURATION: 96 % | SYSTOLIC BLOOD PRESSURE: 132 MMHG | WEIGHT: 280 LBS | HEIGHT: 67 IN

## 2025-02-27 DIAGNOSIS — I10 ESSENTIAL HYPERTENSION: ICD-10-CM

## 2025-02-27 DIAGNOSIS — I48.0 PAF (PAROXYSMAL ATRIAL FIBRILLATION) (HCC): ICD-10-CM

## 2025-02-27 DIAGNOSIS — I35.0 AORTIC VALVE STENOSIS, NONRHEUMATIC: ICD-10-CM

## 2025-02-27 DIAGNOSIS — I35.0 NONRHEUMATIC AORTIC VALVE STENOSIS: ICD-10-CM

## 2025-02-27 DIAGNOSIS — I35.0 AORTIC VALVE STENOSIS, NONRHEUMATIC: Primary | ICD-10-CM

## 2025-02-27 LAB
ECHO AR MAX VEL PISA: 2.8 M/S
ECHO AV MEAN GRADIENT: 26 MMHG
ECHO AV MEAN VELOCITY: 2.3 M/S
ECHO AV PEAK GRADIENT: 53 MMHG
ECHO AV PEAK VELOCITY: 3.7 M/S
ECHO AV REGURGITANT PHT: 502 MS
ECHO AV VELOCITY RATIO: 0.27
ECHO AV VTI: 82.1 CM
ECHO BSA: 2.45 M2
ECHO LA AREA 2C: 21.3 CM2
ECHO LA AREA 4C: 17.2 CM2
ECHO LA MAJOR AXIS: 5.8 CM
ECHO LA MINOR AXIS: 6.1 CM
ECHO LA VOL BP: 51 ML (ref 22–52)
ECHO LA VOL MOD A2C: 59 ML (ref 22–52)
ECHO LA VOL MOD A4C: 41 ML (ref 22–52)
ECHO LA VOL/BSA BIPLANE: 22 ML/M2 (ref 16–34)
ECHO LA VOLUME INDEX MOD A2C: 25 ML/M2 (ref 16–34)
ECHO LA VOLUME INDEX MOD A4C: 18 ML/M2 (ref 16–34)
ECHO LV E' LATERAL VELOCITY: 4.88 CM/S
ECHO LV E' SEPTAL VELOCITY: 5.25 CM/S
ECHO LV EDV A2C: 127 ML
ECHO LV EDV A4C: 144 ML
ECHO LV EDV INDEX A4C: 62 ML/M2
ECHO LV EDV NDEX A2C: 55 ML/M2
ECHO LV EF PHYSICIAN: 65 %
ECHO LV EJECTION FRACTION A2C: 53 %
ECHO LV EJECTION FRACTION A4C: 68 %
ECHO LV EJECTION FRACTION BIPLANE: 62 % (ref 55–100)
ECHO LV ESV A2C: 60 ML
ECHO LV ESV A4C: 47 ML
ECHO LV ESV INDEX A2C: 26 ML/M2
ECHO LV ESV INDEX A4C: 20 ML/M2
ECHO LVOT AV VTI INDEX: 0.32
ECHO LVOT MEAN GRADIENT: 2 MMHG
ECHO LVOT PEAK GRADIENT: 4 MMHG
ECHO LVOT PEAK VELOCITY: 1 M/S
ECHO LVOT VTI: 26.6 CM
ECHO MV A VELOCITY: 1.21 M/S
ECHO MV E DECELERATION TIME (DT): 362 MS
ECHO MV E VELOCITY: 0.74 M/S
ECHO MV E/A RATIO: 0.61
ECHO MV E/E' LATERAL: 15.16
ECHO MV E/E' RATIO (AVERAGED): 14.63
ECHO MV E/E' SEPTAL: 14.1
ECHO MV LVOT VTI INDEX: 1.17
ECHO MV MAX VELOCITY: 1.3 M/S
ECHO MV MEAN GRADIENT: 2 MMHG
ECHO MV MEAN VELOCITY: 0.6 M/S
ECHO MV PEAK GRADIENT: 7 MMHG
ECHO MV VTI: 31 CM
ECHO RA AREA 4C: 12.5 CM2
ECHO RA END SYSTOLIC VOLUME APICAL 4 CHAMBER INDEX BSA: 11 ML/M2
ECHO RA VOLUME: 25 ML
ECHO RV FREE WALL PEAK S': 8.6 CM/S
ECHO RV TAPSE: 2 CM (ref 1.7–?)

## 2025-02-27 PROCEDURE — 1036F TOBACCO NON-USER: CPT | Performed by: INTERNAL MEDICINE

## 2025-02-27 PROCEDURE — 6360000004 HC RX CONTRAST MEDICATION: Performed by: INTERNAL MEDICINE

## 2025-02-27 PROCEDURE — 3078F DIAST BP <80 MM HG: CPT | Performed by: INTERNAL MEDICINE

## 2025-02-27 PROCEDURE — G8417 CALC BMI ABV UP PARAM F/U: HCPCS | Performed by: INTERNAL MEDICINE

## 2025-02-27 PROCEDURE — G8427 DOCREV CUR MEDS BY ELIG CLIN: HCPCS | Performed by: INTERNAL MEDICINE

## 2025-02-27 PROCEDURE — 1090F PRES/ABSN URINE INCON ASSESS: CPT | Performed by: INTERNAL MEDICINE

## 2025-02-27 PROCEDURE — 3075F SYST BP GE 130 - 139MM HG: CPT | Performed by: INTERNAL MEDICINE

## 2025-02-27 PROCEDURE — C8929 TTE W OR WO FOL WCON,DOPPLER: HCPCS

## 2025-02-27 PROCEDURE — 1123F ACP DISCUSS/DSCN MKR DOCD: CPT | Performed by: INTERNAL MEDICINE

## 2025-02-27 PROCEDURE — 1159F MED LIST DOCD IN RCRD: CPT | Performed by: INTERNAL MEDICINE

## 2025-02-27 PROCEDURE — 99214 OFFICE O/P EST MOD 30 MIN: CPT | Performed by: INTERNAL MEDICINE

## 2025-02-27 PROCEDURE — G8400 PT W/DXA NO RESULTS DOC: HCPCS | Performed by: INTERNAL MEDICINE

## 2025-02-27 PROCEDURE — 3017F COLORECTAL CA SCREEN DOC REV: CPT | Performed by: INTERNAL MEDICINE

## 2025-02-27 RX ADMIN — SULFUR HEXAFLUORIDE 2 ML: 60.7; .19; .19 INJECTION, POWDER, LYOPHILIZED, FOR SUSPENSION INTRAVENOUS; INTRAVESICAL at 11:02

## 2025-03-03 ENCOUNTER — APPOINTMENT (OUTPATIENT)
Dept: GENERAL RADIOLOGY | Age: 70
End: 2025-03-03
Payer: MEDICARE

## 2025-03-03 ENCOUNTER — TELEPHONE (OUTPATIENT)
Dept: CARDIOLOGY CLINIC | Age: 70
End: 2025-03-03

## 2025-03-03 ENCOUNTER — HOSPITAL ENCOUNTER (EMERGENCY)
Age: 70
Discharge: HOME | End: 2025-03-03
Attending: EMERGENCY MEDICINE
Payer: MEDICARE

## 2025-03-03 VITALS
RESPIRATION RATE: 18 BRPM | DIASTOLIC BLOOD PRESSURE: 64 MMHG | TEMPERATURE: 98.2 F | BODY MASS INDEX: 43.63 KG/M2 | OXYGEN SATURATION: 100 % | HEIGHT: 67 IN | SYSTOLIC BLOOD PRESSURE: 148 MMHG | WEIGHT: 278 LBS | HEART RATE: 85 BPM

## 2025-03-03 DIAGNOSIS — I48.0 INTERMITTENT ATRIAL FIBRILLATION (HCC): Primary | ICD-10-CM

## 2025-03-03 DIAGNOSIS — I50.32 CHRONIC HEART FAILURE WITH PRESERVED EJECTION FRACTION (HCC): ICD-10-CM

## 2025-03-03 LAB
ANION GAP SERPL CALCULATED.3IONS-SCNC: 11 MMOL/L (ref 3–16)
BASOPHILS # BLD: 0.1 K/UL (ref 0–0.2)
BASOPHILS NFR BLD: 1 %
BUN SERPL-MCNC: 20 MG/DL (ref 7–20)
CALCIUM SERPL-MCNC: 9 MG/DL (ref 8.3–10.6)
CHLORIDE SERPL-SCNC: 105 MMOL/L (ref 99–110)
CO2 SERPL-SCNC: 26 MMOL/L (ref 21–32)
CREAT SERPL-MCNC: 0.9 MG/DL (ref 0.6–1.2)
DEPRECATED RDW RBC AUTO: 14.9 % (ref 12.4–15.4)
EKG DIAGNOSIS: NORMAL
EKG Q-T INTERVAL: 400 MS
EKG QRS DURATION: 102 MS
EKG QTC CALCULATION (BAZETT): 467 MS
EKG R AXIS: -18 DEGREES
EKG T AXIS: 45 DEGREES
EKG VENTRICULAR RATE: 82 BPM
EOSINOPHIL # BLD: 0.2 K/UL (ref 0–0.6)
EOSINOPHIL NFR BLD: 2.4 %
GFR SERPLBLD CREATININE-BSD FMLA CKD-EPI: 68 ML/MIN/{1.73_M2}
GLUCOSE SERPL-MCNC: 164 MG/DL (ref 70–99)
HCT VFR BLD AUTO: 45.3 % (ref 36–48)
HGB BLD-MCNC: 14.5 G/DL (ref 12–16)
INR PPP: 1.07 (ref 0.85–1.15)
LYMPHOCYTES # BLD: 2.4 K/UL (ref 1–5.1)
LYMPHOCYTES NFR BLD: 31.3 %
MAGNESIUM SERPL-MCNC: 2.08 MG/DL (ref 1.8–2.4)
MCH RBC QN AUTO: 26 PG (ref 26–34)
MCHC RBC AUTO-ENTMCNC: 32 G/DL (ref 31–36)
MCV RBC AUTO: 81 FL (ref 80–100)
MONOCYTES # BLD: 0.5 K/UL (ref 0–1.3)
MONOCYTES NFR BLD: 6.3 %
NEUTROPHILS # BLD: 4.5 K/UL (ref 1.7–7.7)
NEUTROPHILS NFR BLD: 59 %
NT-PROBNP SERPL-MCNC: 773 PG/ML (ref 0–124)
PLATELET # BLD AUTO: 304 K/UL (ref 135–450)
PMV BLD AUTO: 7.8 FL (ref 5–10.5)
POTASSIUM SERPL-SCNC: 3.9 MMOL/L (ref 3.5–5.1)
PROTHROMBIN TIME: 14.1 SEC (ref 11.9–14.9)
RBC # BLD AUTO: 5.59 M/UL (ref 4–5.2)
SODIUM SERPL-SCNC: 142 MMOL/L (ref 136–145)
TROPONIN, HIGH SENSITIVITY: 12 NG/L (ref 0–14)
TSH SERPL DL<=0.005 MIU/L-ACNC: 2.33 UIU/ML (ref 0.27–4.2)
WBC # BLD AUTO: 7.7 K/UL (ref 4–11)

## 2025-03-03 PROCEDURE — 99285 EMERGENCY DEPT VISIT HI MDM: CPT

## 2025-03-03 PROCEDURE — 84443 ASSAY THYROID STIM HORMONE: CPT

## 2025-03-03 PROCEDURE — 83735 ASSAY OF MAGNESIUM: CPT

## 2025-03-03 PROCEDURE — 85025 COMPLETE CBC W/AUTO DIFF WBC: CPT

## 2025-03-03 PROCEDURE — 6360000002 HC RX W HCPCS: Performed by: EMERGENCY MEDICINE

## 2025-03-03 PROCEDURE — 96365 THER/PROPH/DIAG IV INF INIT: CPT

## 2025-03-03 PROCEDURE — 93010 ELECTROCARDIOGRAM REPORT: CPT | Performed by: INTERNAL MEDICINE

## 2025-03-03 PROCEDURE — 96375 TX/PRO/DX INJ NEW DRUG ADDON: CPT

## 2025-03-03 PROCEDURE — 83880 ASSAY OF NATRIURETIC PEPTIDE: CPT

## 2025-03-03 PROCEDURE — 71045 X-RAY EXAM CHEST 1 VIEW: CPT

## 2025-03-03 PROCEDURE — 85610 PROTHROMBIN TIME: CPT

## 2025-03-03 PROCEDURE — 96366 THER/PROPH/DIAG IV INF ADDON: CPT

## 2025-03-03 PROCEDURE — 80048 BASIC METABOLIC PNL TOTAL CA: CPT

## 2025-03-03 PROCEDURE — 84484 ASSAY OF TROPONIN QUANT: CPT

## 2025-03-03 PROCEDURE — 93005 ELECTROCARDIOGRAM TRACING: CPT | Performed by: EMERGENCY MEDICINE

## 2025-03-03 RX ORDER — MAGNESIUM SULFATE IN WATER 40 MG/ML
2000 INJECTION, SOLUTION INTRAVENOUS ONCE
Status: COMPLETED | OUTPATIENT
Start: 2025-03-03 | End: 2025-03-03

## 2025-03-03 RX ORDER — FUROSEMIDE 10 MG/ML
40 INJECTION INTRAMUSCULAR; INTRAVENOUS ONCE
Status: COMPLETED | OUTPATIENT
Start: 2025-03-03 | End: 2025-03-03

## 2025-03-03 RX ADMIN — FUROSEMIDE 40 MG: 10 INJECTION, SOLUTION INTRAMUSCULAR; INTRAVENOUS at 01:30

## 2025-03-03 RX ADMIN — MAGNESIUM SULFATE HEPTAHYDRATE 2000 MG: 40 INJECTION, SOLUTION INTRAVENOUS at 01:33

## 2025-03-03 ASSESSMENT — PAIN - FUNCTIONAL ASSESSMENT: PAIN_FUNCTIONAL_ASSESSMENT: NONE - DENIES PAIN

## 2025-03-03 NOTE — DISCHARGE INSTRUCTIONS
Continue your usual home medications.    I would like for you to take a single extra tablet of furosemide (Lasix) tomorrow.    Be sure to contact your cardiologist to arrange for a follow up visit.    If you have any new or worsening issues after going home don't hesitate to return here for reevaluation at any time 24/7!

## 2025-03-03 NOTE — TELEPHONE ENCOUNTER
Pt called stating they were in the ER today for AFIB and would like NPKV to look at the chart to see what she thinks? Does the pt need a appt? BNP was 773    Pls advise

## 2025-03-04 NOTE — TELEPHONE ENCOUNTER
I've looked through and all looks ok except elevate BNP. Did they given her any meds while there? How is she feeling today? KJV

## 2025-03-04 NOTE — TELEPHONE ENCOUNTER
Spoke to patient They gave her Lasix and small IV of magnesium. She is feeling better her sob is better, they told her she had congestion in her lungs but sent her home. She still feels congested in her throat. She stated she is ok now. She is worried still but is trying to stay busy. They gave her oral lasix also.    Tried to reach patient LMOM for her to call and make a appointment with SMITHA

## 2025-03-05 ASSESSMENT — ENCOUNTER SYMPTOMS
ABDOMINAL PAIN: 0
NAUSEA: 0
VOMITING: 0
COUGH: 0
SHORTNESS OF BREATH: 0

## 2025-03-05 NOTE — ED PROVIDER NOTES
has voided copiously. She is maintaining SpO2 % without supplemental oxygen and her RR and WOB are normal. Discussed exam findings, test results, Dxs, and expected clinical course with her at length. She is comfortable with DC home now. Return precautions reviewed in detail and outpatient follow up advised.    Clinical Impression(s)  1. Intermittent atrial fibrillation (HCC)    2. Chronic heart failure with preserved ejection fraction (HCC)      Provider Signature: MD Casa George Michael R, MD  03/05/25 0396

## 2025-03-18 ENCOUNTER — HOSPITAL ENCOUNTER (EMERGENCY)
Age: 70
Discharge: HOME | End: 2025-03-18
Attending: EMERGENCY MEDICINE
Payer: MEDICARE

## 2025-03-18 ENCOUNTER — TELEPHONE (OUTPATIENT)
Dept: CARDIOLOGY CLINIC | Age: 70
End: 2025-03-18

## 2025-03-18 ENCOUNTER — APPOINTMENT (OUTPATIENT)
Dept: GENERAL RADIOLOGY | Age: 70
End: 2025-03-18
Payer: MEDICARE

## 2025-03-18 VITALS
RESPIRATION RATE: 13 BRPM | OXYGEN SATURATION: 100 % | HEIGHT: 67 IN | BODY MASS INDEX: 44.42 KG/M2 | DIASTOLIC BLOOD PRESSURE: 101 MMHG | TEMPERATURE: 98 F | WEIGHT: 283 LBS | SYSTOLIC BLOOD PRESSURE: 149 MMHG | HEART RATE: 61 BPM

## 2025-03-18 DIAGNOSIS — R06.09 DYSPNEA ON EXERTION: ICD-10-CM

## 2025-03-18 DIAGNOSIS — I48.0 PAROXYSMAL ATRIAL FIBRILLATION (HCC): ICD-10-CM

## 2025-03-18 DIAGNOSIS — N39.0 ACUTE UTI: Primary | ICD-10-CM

## 2025-03-18 LAB
ALBUMIN SERPL-MCNC: 3.8 G/DL (ref 3.4–5)
ALBUMIN/GLOB SERPL: 1.2 {RATIO} (ref 1.1–2.2)
ALP SERPL-CCNC: 50 U/L (ref 40–129)
ALT SERPL-CCNC: 15 U/L (ref 10–40)
ANION GAP SERPL CALCULATED.3IONS-SCNC: 11 MMOL/L (ref 3–16)
AST SERPL-CCNC: 21 U/L (ref 15–37)
BACTERIA URNS QL MICRO: ABNORMAL /HPF
BASE EXCESS BLDV CALC-SCNC: 4.2 MMOL/L (ref -3–3)
BASOPHILS # BLD: 0.1 K/UL (ref 0–0.2)
BASOPHILS NFR BLD: 0.8 %
BILIRUB SERPL-MCNC: <0.2 MG/DL (ref 0–1)
BILIRUB UR QL STRIP.AUTO: NEGATIVE
BUN SERPL-MCNC: 17 MG/DL (ref 7–20)
CALCIUM SERPL-MCNC: 9 MG/DL (ref 8.3–10.6)
CHARACTER UR: ABNORMAL
CHLORIDE SERPL-SCNC: 102 MMOL/L (ref 99–110)
CLARITY UR: ABNORMAL
CO2 BLDV-SCNC: 71 MMOL/L
CO2 SERPL-SCNC: 27 MMOL/L (ref 21–32)
COHGB MFR BLDV: 2.5 % (ref 0–1.5)
COLOR UR: YELLOW
CREAT SERPL-MCNC: 1 MG/DL (ref 0.6–1.2)
CRYSTALS URNS MICRO: ABNORMAL /HPF
DEPRECATED RDW RBC AUTO: 15 % (ref 12.4–15.4)
DO-HGB MFR BLDV: 16 %
EOSINOPHIL # BLD: 0.2 K/UL (ref 0–0.6)
EOSINOPHIL NFR BLD: 2.2 %
EPI CELLS #/AREA URNS HPF: ABNORMAL /HPF (ref 0–5)
GFR SERPLBLD CREATININE-BSD FMLA CKD-EPI: 60 ML/MIN/{1.73_M2}
GLUCOSE SERPL-MCNC: 105 MG/DL (ref 70–99)
GLUCOSE UR STRIP.AUTO-MCNC: >=1000 MG/DL
HCO3 BLDV-SCNC: 30.2 MMOL/L (ref 23–29)
HCT VFR BLD AUTO: 45.4 % (ref 36–48)
HGB BLD-MCNC: 14.7 G/DL (ref 12–16)
HGB UR QL STRIP.AUTO: NEGATIVE
KETONES UR STRIP.AUTO-MCNC: 15 MG/DL
LEUKOCYTE ESTERASE UR QL STRIP.AUTO: ABNORMAL
LYMPHOCYTES # BLD: 2.1 K/UL (ref 1–5.1)
LYMPHOCYTES NFR BLD: 30.2 %
MCH RBC QN AUTO: 26 PG (ref 26–34)
MCHC RBC AUTO-ENTMCNC: 32.3 G/DL (ref 31–36)
MCV RBC AUTO: 80.5 FL (ref 80–100)
METHGB MFR BLDV: 0.8 %
MONOCYTES # BLD: 0.5 K/UL (ref 0–1.3)
MONOCYTES NFR BLD: 6.6 %
NEUTROPHILS # BLD: 4.2 K/UL (ref 1.7–7.7)
NEUTROPHILS NFR BLD: 60.2 %
NITRITE UR QL STRIP.AUTO: POSITIVE
NT-PROBNP SERPL-MCNC: 1541 PG/ML (ref 0–124)
O2 CT VFR BLDV CALC: 17 VOL %
O2 THERAPY: ABNORMAL
PCO2 BLDV: 49.3 MMHG (ref 40–50)
PH BLDV: 7.4 [PH] (ref 7.35–7.45)
PH UR STRIP.AUTO: 5.5 [PH] (ref 5–8)
PLATELET # BLD AUTO: 298 K/UL (ref 135–450)
PMV BLD AUTO: 7.3 FL (ref 5–10.5)
PO2 BLDV: 47.8 MMHG (ref 25–40)
POTASSIUM SERPL-SCNC: 4 MMOL/L (ref 3.5–5.1)
PROT SERPL-MCNC: 6.9 G/DL (ref 6.4–8.2)
PROT UR STRIP.AUTO-MCNC: NEGATIVE MG/DL
RBC # BLD AUTO: 5.64 M/UL (ref 4–5.2)
RBC #/AREA URNS HPF: ABNORMAL /HPF (ref 0–4)
SAO2 % BLDV: 83 %
SODIUM SERPL-SCNC: 140 MMOL/L (ref 136–145)
SP GR UR STRIP.AUTO: >=1.03 (ref 1–1.03)
TROPONIN, HIGH SENSITIVITY: 11 NG/L (ref 0–14)
TROPONIN, HIGH SENSITIVITY: 13 NG/L (ref 0–14)
UA COMPLETE W REFLEX CULTURE PNL UR: YES
UA DIPSTICK W REFLEX MICRO PNL UR: YES
URN SPEC COLLECT METH UR: ABNORMAL
UROBILINOGEN UR STRIP-ACNC: 1 E.U./DL
WBC # BLD AUTO: 6.9 K/UL (ref 4–11)
WBC #/AREA URNS HPF: ABNORMAL /HPF (ref 0–5)

## 2025-03-18 PROCEDURE — 87086 URINE CULTURE/COLONY COUNT: CPT

## 2025-03-18 PROCEDURE — 93005 ELECTROCARDIOGRAM TRACING: CPT | Performed by: EMERGENCY MEDICINE

## 2025-03-18 PROCEDURE — 82803 BLOOD GASES ANY COMBINATION: CPT

## 2025-03-18 PROCEDURE — 87077 CULTURE AEROBIC IDENTIFY: CPT

## 2025-03-18 PROCEDURE — 84484 ASSAY OF TROPONIN QUANT: CPT

## 2025-03-18 PROCEDURE — 83880 ASSAY OF NATRIURETIC PEPTIDE: CPT

## 2025-03-18 PROCEDURE — 87186 SC STD MICRODIL/AGAR DIL: CPT

## 2025-03-18 PROCEDURE — 80053 COMPREHEN METABOLIC PANEL: CPT

## 2025-03-18 PROCEDURE — 71045 X-RAY EXAM CHEST 1 VIEW: CPT

## 2025-03-18 PROCEDURE — 99285 EMERGENCY DEPT VISIT HI MDM: CPT

## 2025-03-18 PROCEDURE — 85025 COMPLETE CBC W/AUTO DIFF WBC: CPT

## 2025-03-18 PROCEDURE — 6370000000 HC RX 637 (ALT 250 FOR IP): Performed by: PHYSICIAN ASSISTANT

## 2025-03-18 PROCEDURE — 81001 URINALYSIS AUTO W/SCOPE: CPT

## 2025-03-18 RX ORDER — CIPROFLOXACIN 500 MG/1
500 TABLET, FILM COATED ORAL 2 TIMES DAILY
Qty: 14 TABLET | Refills: 0 | Status: SHIPPED | OUTPATIENT
Start: 2025-03-18 | End: 2025-03-25

## 2025-03-18 RX ORDER — CIPROFLOXACIN 500 MG/1
500 TABLET, FILM COATED ORAL ONCE
Status: COMPLETED | OUTPATIENT
Start: 2025-03-18 | End: 2025-03-18

## 2025-03-18 RX ADMIN — CIPROFLOXACIN HYDROCHLORIDE 500 MG: 500 TABLET, FILM COATED ORAL at 22:31

## 2025-03-18 ASSESSMENT — ENCOUNTER SYMPTOMS
EYE REDNESS: 0
SORE THROAT: 0
RHINORRHEA: 0
NAUSEA: 0
BACK PAIN: 0
SHORTNESS OF BREATH: 1
EYE ITCHING: 0
EYE DISCHARGE: 0
ABDOMINAL PAIN: 0
DIARRHEA: 0
STRIDOR: 0
COUGH: 1
VOMITING: 0

## 2025-03-18 ASSESSMENT — PAIN - FUNCTIONAL ASSESSMENT: PAIN_FUNCTIONAL_ASSESSMENT: NONE - DENIES PAIN

## 2025-03-18 NOTE — TELEPHONE ENCOUNTER
Patient seen in hospital by Dr. Bain.  Pt is on sotalol and is adjusting herself.  See note from patient.    Dr. Bain--12/25/24 EP Consultation  From cardiology standpoint she can be discharged from follow-up with Dr. Hernandez as outpatient

## 2025-03-18 NOTE — TELEPHONE ENCOUNTER
Pt woke up around 5am to go to the bathroom and couldn't catch her breath. Pt took an extra dose sotalol at 6am and another 8hrs later. Pt is having extreme sob when about and her cardio mobile is showing she is afib. Please call pt to discuss.

## 2025-03-19 LAB
EKG ATRIAL RATE: 62 BPM
EKG DIAGNOSIS: NORMAL
EKG P AXIS: 26 DEGREES
EKG P-R INTERVAL: 132 MS
EKG Q-T INTERVAL: 440 MS
EKG QRS DURATION: 102 MS
EKG QTC CALCULATION (BAZETT): 446 MS
EKG R AXIS: -14 DEGREES
EKG T AXIS: 198 DEGREES
EKG VENTRICULAR RATE: 62 BPM

## 2025-03-19 PROCEDURE — 93010 ELECTROCARDIOGRAM REPORT: CPT | Performed by: INTERNAL MEDICINE

## 2025-03-19 NOTE — ED NOTES
Pt discharged in stable condition, AVS and follow up care reviewed, IV removed, all questions answered.

## 2025-03-19 NOTE — ED PROVIDER NOTES
In addition to the advanced practice provider, I personally saw Claire Dong and performed a substantive portion of the visit including all aspects of the medical decision making.    Medical Decision Making  Patient seen and evaluated at bedside.  Briefly, she is presenting because her phone told her that she might be in atrial fibrillation.  She does have history of paroxysmal atrial fibrillation and is on Eliquis.  Lab workup negative for anemia or leukocytosis.Troponin within normal levels x 2.  VBG showed compensated respiratory acidosis. Urinalysis is positive for infection.  Patient given a dose of oral ciprofloxacin in the ED.  Chest x-ray negative for acute process.  She remained hemodynamically stable on room air while in the ED.    Patient notified of her lab and imaging results.  She is feeling asymptomatic during my encounter with the patient.  She was provided the prescription for ciprofloxacin and advised continue supportive care at home and to follow-up with primary care physician.  She was given ED return precautions patient understands agrees with plan going forward.  Patient stable for discharge.    EKG  Normal sinus rhythm with no acute ST elevations.  Ventricular of 62 bpm.  Left-sided axis deviation.  QRS duration 102.  QTc 446.      SEP-1  Is this patient to be included in the SEP-1 Core Measure due to severe sepsis or septic shock?   No     Exclusion criteria - the patient is NOT to be included for SEP-1 Core Measure due to:  2+ SIRS criteria are not met    Screenings     [place] Coma Scale  Eye Opening: Spontaneous  Best Verbal Response: Oriented  Best Motor Response: Obeys commands  [place] Coma Scale Score: 15             Patient Referrals:  Yari Santos MD  [address] [facility] [address]  [facility] [address] [phone]    Schedule an appointment as soon as possible for a visit in 2 days      [facility] Emergency Department  [address]  [facility] [address] [phone]    As 
metabolic abnormality, amongst other more emergent diagnostic considerations.  Patient was advised to immediately return to emergency department if symptoms worsen.          I am the Primary Clinician of Record.  FINAL IMPRESSION      1. Acute UTI    2. Paroxysmal atrial fibrillation (HCC)    3. Dyspnea on exertion          DISPOSITION/PLAN     DISPOSITION Decision To Discharge 03/18/2025 10:19:15 PM   DISPOSITION CONDITION Stable           PATIENT REFERRED TO:  Yari Santos MD  [address] [facility] [address]  [facility] [address]  [phone]    Schedule an appointment as soon as possible for a visit in 2 days      [facility] Emergency Department  [address] [facility] [address]  [phone]    As needed, If symptoms worsen      DISCHARGE MEDICATIONS:  Discharge Medication List as of 3/18/2025 10:27 PM        START taking these medications    Details   ciprofloxacin (CIPRO) 500 MG tablet Take 1 tablet by mouth 2 times daily for 7 days, Disp-14 tablet, R-0Normal             DISCONTINUED MEDICATIONS:  Discharge Medication List as of 3/18/2025 10:27 PM                 (Please note that portions of this note were completed with a voice recognition program.  Efforts were made to edit the dictations but occasionally words are mis-transcribed.)    MAREN Barclay (electronically signed)        Tashia Fritz PA  03/18/25 2245       Tashia Fritz PA  03/18/25 2248

## 2025-03-20 ENCOUNTER — TELEPHONE (OUTPATIENT)
Dept: CARDIOLOGY CLINIC | Age: 70
End: 2025-03-20

## 2025-03-20 LAB
BACTERIA UR CULT: ABNORMAL
BACTERIA UR CULT: ABNORMAL
ORGANISM: ABNORMAL

## 2025-03-20 NOTE — TELEPHONE ENCOUNTER
Pt called in on 3/18/25 with SOB and A-fib per her her cardio mobile. No rate reported. Called for KVNP. Pt took extra sotalol x 2.    MXA saw pt 12/25/24 as an inpatient.  Note states:  From cardiology standpoint she can be discharged and follow-up with Dr. Hernandez as outpatient.  SRNP note 8/7/24 states follow up with Dr. Maravilla in April.     In ED on 3/18, she was in NSR rate 62. Had acute UTI.     Still needs follow up with EP.

## 2025-05-06 ENCOUNTER — TELEPHONE (OUTPATIENT)
Dept: VASCULAR SURGERY | Age: 70
End: 2025-05-06

## 2025-05-06 DIAGNOSIS — I65.23 CAROTID ATHEROSCLEROSIS, BILATERAL: Primary | ICD-10-CM

## 2025-05-12 ENCOUNTER — TELEPHONE (OUTPATIENT)
Dept: CARDIOLOGY CLINIC | Age: 70
End: 2025-05-12

## 2025-05-12 NOTE — TELEPHONE ENCOUNTER
I sspoke with pt.. She stated that her Cardia Mobile said she was in afib and also that she had SP with wide QRS wave. Her BP at that time was 149/85 HR 74. She felt like she was vibrating. She took an extra half of the sotalol as she was instructed. She has SOB , fatigue- denies CP. She has an appointment for a Mammogram but wonders if she needs to cancel and go to the ER?

## 2025-05-12 NOTE — TELEPHONE ENCOUNTER
Per patient's cardio mobile device is is currently in afib.    She stated that she began feeling unwell yesterday and the device reported a widened QRS complex.     She noted that her BP was 149/85mm HG and her HR was 74 at the time.    She taken extra sotalol x2 as instructed but reports that she is still in afib    Please contact patient to discuss.

## 2025-05-12 NOTE — TELEPHONE ENCOUNTER
Called patient and relayed message per [facility] patient v/u and stated that she will have to call back and schedule the appointment as she wasn't by her schedule

## 2025-05-12 NOTE — TELEPHONE ENCOUNTER
No need to go to ER if her heart rate is controlled. She can come in to be seen tomorrow in AF clinic spot if she wants. Would continue sotalol 120 mg BID until we see her    AQUILES Grant-CNP

## 2025-06-27 NOTE — PROGRESS NOTES
10/17/2013 03:33 PM     Lab Results   Component Value Date    IRON 45 06/13/2014    TIBC 412 06/13/2014    FERRITIN 34 10/17/2013        Assessment/Plan:    1. diastolic congestive heart failure  - compensated, continue entresto, brady and jardiance, labs today   2. PAF -continue betapace and AC   3. Hypertension- controlled   4. Aortic valve stenosis- moderate to severe, has echo and f/u scheduled         Instructions:   Medications: no change in meds  Labs: today  Lifestyle Recommendations: Weigh yourself every day in the morning after urination, call Mark if wt increases 2-3lb in one day or 5lb in one week, Limit sodium to 3000mg/day and fluids to 2L or 64oz/day.   Follow up:9 months      [place] [facility] Resource Line: [phone]           I appreciate the opportunity of cooperating in the care of this individual.    MARK MCNALLY, AQUILES - CNP, 6/27/2025, 1:23 PM

## 2025-07-03 ENCOUNTER — HOSPITAL ENCOUNTER (OUTPATIENT)
Age: 70
Discharge: HOME | End: 2025-07-03
Payer: MEDICARE

## 2025-07-03 ENCOUNTER — OFFICE VISIT (OUTPATIENT)
Dept: CARDIOLOGY CLINIC | Age: 70
End: 2025-07-03
Payer: MEDICARE

## 2025-07-03 VITALS
DIASTOLIC BLOOD PRESSURE: 80 MMHG | OXYGEN SATURATION: 97 % | SYSTOLIC BLOOD PRESSURE: 110 MMHG | BODY MASS INDEX: 43.47 KG/M2 | WEIGHT: 277 LBS | HEIGHT: 67 IN | HEART RATE: 70 BPM

## 2025-07-03 DIAGNOSIS — I35.0 AORTIC VALVE STENOSIS, ETIOLOGY OF CARDIAC VALVE DISEASE UNSPECIFIED: ICD-10-CM

## 2025-07-03 DIAGNOSIS — I10 BENIGN ESSENTIAL HTN: ICD-10-CM

## 2025-07-03 DIAGNOSIS — I50.32 CHRONIC DIASTOLIC (CONGESTIVE) HEART FAILURE (HCC): Primary | ICD-10-CM

## 2025-07-03 DIAGNOSIS — I48.0 PAF (PAROXYSMAL ATRIAL FIBRILLATION) (HCC): Chronic | ICD-10-CM

## 2025-07-03 DIAGNOSIS — I50.32 CHRONIC DIASTOLIC (CONGESTIVE) HEART FAILURE (HCC): ICD-10-CM

## 2025-07-03 LAB
ANION GAP SERPL CALCULATED.3IONS-SCNC: 12 MMOL/L (ref 3–16)
BUN SERPL-MCNC: 22 MG/DL (ref 7–20)
CALCIUM SERPL-MCNC: 8.9 MG/DL (ref 8.3–10.6)
CHLORIDE SERPL-SCNC: 103 MMOL/L (ref 99–110)
CO2 SERPL-SCNC: 23 MMOL/L (ref 21–32)
CREAT SERPL-MCNC: 1 MG/DL (ref 0.6–1.2)
GFR SERPLBLD CREATININE-BSD FMLA CKD-EPI: 60 ML/MIN/{1.73_M2}
GLUCOSE SERPL-MCNC: 116 MG/DL (ref 70–99)
NT-PROBNP SERPL-MCNC: 102 PG/ML (ref 0–124)
POTASSIUM SERPL-SCNC: 4.3 MMOL/L (ref 3.5–5.1)
SODIUM SERPL-SCNC: 138 MMOL/L (ref 136–145)

## 2025-07-03 PROCEDURE — 1036F TOBACCO NON-USER: CPT | Performed by: NURSE PRACTITIONER

## 2025-07-03 PROCEDURE — 3017F COLORECTAL CA SCREEN DOC REV: CPT | Performed by: NURSE PRACTITIONER

## 2025-07-03 PROCEDURE — 1159F MED LIST DOCD IN RCRD: CPT | Performed by: NURSE PRACTITIONER

## 2025-07-03 PROCEDURE — 1160F RVW MEDS BY RX/DR IN RCRD: CPT | Performed by: NURSE PRACTITIONER

## 2025-07-03 PROCEDURE — 99214 OFFICE O/P EST MOD 30 MIN: CPT | Performed by: NURSE PRACTITIONER

## 2025-07-03 PROCEDURE — G8427 DOCREV CUR MEDS BY ELIG CLIN: HCPCS | Performed by: NURSE PRACTITIONER

## 2025-07-03 PROCEDURE — 1123F ACP DISCUSS/DSCN MKR DOCD: CPT | Performed by: NURSE PRACTITIONER

## 2025-07-03 PROCEDURE — 3074F SYST BP LT 130 MM HG: CPT | Performed by: NURSE PRACTITIONER

## 2025-07-03 PROCEDURE — G8400 PT W/DXA NO RESULTS DOC: HCPCS | Performed by: NURSE PRACTITIONER

## 2025-07-03 PROCEDURE — 3079F DIAST BP 80-89 MM HG: CPT | Performed by: NURSE PRACTITIONER

## 2025-07-03 PROCEDURE — 1090F PRES/ABSN URINE INCON ASSESS: CPT | Performed by: NURSE PRACTITIONER

## 2025-07-03 PROCEDURE — 36415 COLL VENOUS BLD VENIPUNCTURE: CPT

## 2025-07-03 PROCEDURE — 83880 ASSAY OF NATRIURETIC PEPTIDE: CPT

## 2025-07-03 PROCEDURE — G8417 CALC BMI ABV UP PARAM F/U: HCPCS | Performed by: NURSE PRACTITIONER

## 2025-07-03 PROCEDURE — 80048 BASIC METABOLIC PNL TOTAL CA: CPT

## 2025-07-03 RX ORDER — SPIRONOLACTONE 25 MG/1
25 TABLET ORAL DAILY
Qty: 90 TABLET | Refills: 1 | Status: SHIPPED | OUTPATIENT
Start: 2025-07-03

## 2025-07-03 NOTE — PATIENT INSTRUCTIONS
Instructions:   Medications: no change in meds  Labs: today  Lifestyle Recommendations: Weigh yourself every day in the morning after urination, call Rita if wt increases 2-3lb in one day or 5lb in one week, Limit sodium to 3000mg/day and fluids to 2L or 64oz/day.   Follow up:9 months      [facility]: [phone]

## 2025-07-07 ENCOUNTER — RESULTS FOLLOW-UP (OUTPATIENT)
Dept: CARDIOLOGY CLINIC | Age: 70
End: 2025-07-07

## 2025-07-07 NOTE — TELEPHONE ENCOUNTER
I spoke with pt and relayed Lab results per NPKV. Pt verbalized understanding.----- Message from AQUILES Garcia CNP sent at 7/7/2025  9:10 AM EDT -----  Labs look great, no new orders. KJV

## 2025-08-04 RX ORDER — APIXABAN 5 MG/1
5 TABLET, FILM COATED ORAL 2 TIMES DAILY
Qty: 180 TABLET | Refills: 1 | Status: SHIPPED | OUTPATIENT
Start: 2025-08-04